# Patient Record
Sex: FEMALE
[De-identification: names, ages, dates, MRNs, and addresses within clinical notes are randomized per-mention and may not be internally consistent; named-entity substitution may affect disease eponyms.]

---

## 2022-08-08 ENCOUNTER — HOSPITAL ENCOUNTER (INPATIENT)
Dept: HOSPITAL 5 - ED | Age: 62
LOS: 11 days | Discharge: NURSING, REHAB OR LONG-TERM CARE | DRG: 177 | End: 2022-08-19
Attending: STUDENT IN AN ORGANIZED HEALTH CARE EDUCATION/TRAINING PROGRAM | Admitting: HOSPITALIST
Payer: MEDICARE

## 2022-08-08 DIAGNOSIS — N18.6: ICD-10-CM

## 2022-08-08 DIAGNOSIS — D53.9: ICD-10-CM

## 2022-08-08 DIAGNOSIS — I13.2: ICD-10-CM

## 2022-08-08 DIAGNOSIS — J12.82: ICD-10-CM

## 2022-08-08 DIAGNOSIS — N25.81: ICD-10-CM

## 2022-08-08 DIAGNOSIS — Z99.2: ICD-10-CM

## 2022-08-08 DIAGNOSIS — I50.9: ICD-10-CM

## 2022-08-08 DIAGNOSIS — U07.1: Primary | ICD-10-CM

## 2022-08-08 DIAGNOSIS — Z88.8: ICD-10-CM

## 2022-08-08 DIAGNOSIS — J96.01: ICD-10-CM

## 2022-08-08 DIAGNOSIS — E44.1: ICD-10-CM

## 2022-08-08 DIAGNOSIS — Z88.2: ICD-10-CM

## 2022-08-08 DIAGNOSIS — E03.9: ICD-10-CM

## 2022-08-08 DIAGNOSIS — Z82.49: ICD-10-CM

## 2022-08-08 LAB
ALBUMIN SERPL-MCNC: 2.8 G/DL (ref 3.9–5)
ALT SERPL-CCNC: 14 UNITS/L (ref 7–56)
ANISOCYTOSIS BLD QL SMEAR: (no result)
APTT BLD: 33.8 SEC. (ref 24.2–36.6)
BAND NEUTROPHILS # (MANUAL): 1.2 K/MM3
BUN SERPL-MCNC: 39 MG/DL (ref 7–17)
BUN/CREAT SERPL: 11 %
CALCIUM SERPL-MCNC: 8.2 MG/DL (ref 8.4–10.2)
HCT VFR BLD CALC: 30.4 % (ref 30.3–42.9)
HDLC SERPL-MCNC: 64 MG/DL (ref 40–59)
HEMOLYSIS INDEX: 50
HGB BLD-MCNC: 9.6 GM/DL (ref 10.1–14.3)
INR PPP: 0.91 (ref 0.87–1.13)
MCHC RBC AUTO-ENTMCNC: 31 % (ref 30–34)
MCV RBC AUTO: 99 FL (ref 79–97)
MYELOCYTES # (MANUAL): 0 K/MM3
PLATELET # BLD: 187 K/MM3 (ref 140–440)
PROMYELOCYTES # (MANUAL): 0 K/MM3
RBC # BLD AUTO: 3.08 M/MM3 (ref 3.65–5.03)
TOTAL CELLS COUNTED BLD: 100

## 2022-08-08 PROCEDURE — 87641 MR-STAPH DNA AMP PROBE: CPT

## 2022-08-08 PROCEDURE — 85730 THROMBOPLASTIN TIME PARTIAL: CPT

## 2022-08-08 PROCEDURE — 94760 N-INVAS EAR/PLS OXIMETRY 1: CPT

## 2022-08-08 PROCEDURE — 84484 ASSAY OF TROPONIN QUANT: CPT

## 2022-08-08 PROCEDURE — C8929 TTE W OR WO FOL WCON,DOPPLER: HCPCS

## 2022-08-08 PROCEDURE — U0003 INFECTIOUS AGENT DETECTION BY NUCLEIC ACID (DNA OR RNA); SEVERE ACUTE RESPIRATORY SYNDROME CORONAVIRUS 2 (SARS-COV-2) (CORONAVIRUS DISEASE [COVID-19]), AMPLIFIED PROBE TECHNIQUE, MAKING USE OF HIGH THROUGHPUT TECHNOLOGIES AS DESCRIBED BY CMS-2020-01-R: HCPCS

## 2022-08-08 PROCEDURE — 93306 TTE W/DOPPLER COMPLETE: CPT

## 2022-08-08 PROCEDURE — 87040 BLOOD CULTURE FOR BACTERIA: CPT

## 2022-08-08 PROCEDURE — 5A0945A ASSISTANCE WITH RESPIRATORY VENTILATION, 24-96 CONSECUTIVE HOURS, HIGH NASAL FLOW/VELOCITY: ICD-10-PCS | Performed by: HOSPITALIST

## 2022-08-08 PROCEDURE — 94640 AIRWAY INHALATION TREATMENT: CPT

## 2022-08-08 PROCEDURE — 82728 ASSAY OF FERRITIN: CPT

## 2022-08-08 PROCEDURE — 93005 ELECTROCARDIOGRAM TRACING: CPT

## 2022-08-08 PROCEDURE — 36415 COLL VENOUS BLD VENIPUNCTURE: CPT

## 2022-08-08 PROCEDURE — 83615 LACTATE (LD) (LDH) ENZYME: CPT

## 2022-08-08 PROCEDURE — 83880 ASSAY OF NATRIURETIC PEPTIDE: CPT

## 2022-08-08 PROCEDURE — 85025 COMPLETE CBC W/AUTO DIFF WBC: CPT

## 2022-08-08 PROCEDURE — 85379 FIBRIN DEGRADATION QUANT: CPT

## 2022-08-08 PROCEDURE — 86140 C-REACTIVE PROTEIN: CPT

## 2022-08-08 PROCEDURE — 80048 BASIC METABOLIC PNL TOTAL CA: CPT

## 2022-08-08 PROCEDURE — 94644 CONT INHLJ TX 1ST HOUR: CPT

## 2022-08-08 PROCEDURE — 80074 ACUTE HEPATITIS PANEL: CPT

## 2022-08-08 PROCEDURE — 85007 BL SMEAR W/DIFF WBC COUNT: CPT

## 2022-08-08 PROCEDURE — 80053 COMPREHEN METABOLIC PANEL: CPT

## 2022-08-08 PROCEDURE — 71045 X-RAY EXAM CHEST 1 VIEW: CPT

## 2022-08-08 PROCEDURE — 80061 LIPID PANEL: CPT

## 2022-08-08 PROCEDURE — 93970 EXTREMITY STUDY: CPT

## 2022-08-08 PROCEDURE — 82962 GLUCOSE BLOOD TEST: CPT

## 2022-08-08 PROCEDURE — 85610 PROTHROMBIN TIME: CPT

## 2022-08-08 NOTE — HISTORY AND PHYSICAL REPORT
History of Present Illness


Date of examination: 08/08/22


Date of admission: 


08/08/22 22:37





Chief complaint: 





Dyspnea, respiratory distress


History of present illness: 





 61-year-old female with a history of end-stage renal disease (dialysis 

dependent) who lives in a nursing home for rehabilitation for the last 2 weeks 

was brought to the emergency department complaining of shortness of breath which

started approximately 2 days ago.  Patient reports a "wet" cough, that she still

is unable to expectorate.  Patient denies chest pain, fever, chills.  Patient 

denies worsening of lower extremity edema or lower extremity pain.  Patient 

reports that exertion and movement makes the shortness of breath worse, and the 

oxygen that she was put on prior to coming to the emergency department has 

helped with her shortness of breath.  Patient was placed on a nonrebreather at 

the nursing home, as she was hypoxic at the nursing home .  Patient is not home 

O2 dependent.  Patient has not missed any dialysis, and her last one was on 

Saturday.  Patient was tested for and confirmed positive for COVID-19, today.





In the emergency room patient is found to have BUN of 39, creatinine 3.7, 

troponin 0.075 and proBNP 39213, chest x-ray shows bibasilar opacities, likely 

mild atelectasis.'s were going to admit the patient we will put the patient on 

neb treatment steroid antibiotic consult infectious disease and nephrology





Past History


Past Medical History: ESRD, hypertension, hypothyroidism, renal failure


Past Surgical History: No surgical history


Social history: no significant social history


Family history: hypertension





Medications and Allergies


                                    Allergies











Allergy/AdvReac Type Severity Reaction Status Date / Time


 


NSAIDS (Non-Steroidal Allergy  Rash Verified 08/08/22 13:30





Anti-Inflamma     


 


Sulfa (Sulfonamide Allergy  Rash Verified 08/08/22 13:30





Antibiotics)     











                                Home Medications











 Medication  Instructions  Recorded  Confirmed  Last Taken  Type


 


Ascorbic Acid [Vitamin C] 500 mg PO DAILY 08/08/22 08/08/22 Unknown History


 


Ferrous Sulfate [Ferrous Sulfate 324 mg PO DAILY 08/08/22 08/08/22 Unknown 

History





324 MG]     


 


Levothyroxine Sodium 200 mcg PO DAILY 08/08/22 08/08/22 Unknown History





[Levothyroxine]     


 


Venlafaxine [Effexor 37.5mg tab] 37.5 mg PO QDAY 08/08/22 08/08/22 Unknown 

History


 


carvediloL [Coreg] 12.5 mg PO BID 08/08/22 08/08/22 Unknown History


 


sevelamer HCL [Sevelamer HCl] 800 mg PO DAILY 08/08/22 08/08/22 Unknown History











Active Meds: 


Active Medications





Acetaminophen (Acetaminophen 325 Mg Tab)  650 mg PO Q4H PRN


   PRN Reason: Pain MILD(1-3)/Fever >100.5/HA


Morphine Sulfate (Morphine 2 Mg/1 Ml Inj)  2 mg IV Q4H PRN


   PRN Reason: Pain, Moderate (4-6)


Ondansetron HCl (Ondansetron 4 Mg/2 Ml Inj)  4 mg IV Q8H PRN


   PRN Reason: Nausea And Vomiting


Sodium Chloride (Sodium Chloride 0.9% 10 Ml Flush Syringe)  10 ml IV BID BERHANE


Sodium Chloride (Sodium Chloride 0.9% 10 Ml Flush Syringe)  10 ml IV PRN PRN


   PRN Reason: LINE FLUSH











Review of Systems


All systems: negative


Constitutional: fatigue, malaise, lethargy


Cardiovascular: edema, shortness of breath, dyspnea on exertion


Respiratory: shortness of breath, dyspnea on exertion





Exam





- Constitutional


Vitals: 


                                        











Temp Pulse Resp BP Pulse Ox


 


 99 F   93 H  20   158/85   88 


 


 08/08/22 13:22  08/08/22 21:21  08/08/22 21:21  08/08/22 21:21  08/08/22 21:21











General appearance: Present: no acute distress, well-nourished





- EENT


Eyes: Present: PERRL


ENT: hearing intact, clear oral mucosa





- Neck


Neck: Present: supple, normal ROM





- Respiratory


Respiratory effort: normal


Respiratory: bilateral: diminished





- Cardiovascular


Heart Sounds: Present: S1 & S2.  Absent: rub, click





- Extremities


Extremities: pulses symmetrical, No edema


Peripheral Pulses: within normal limits





- Abdominal


General gastrointestinal: Present: soft, non-tender, non-distended, normal bowel

sounds


Female genitourinary: Present: normal





- Integumentary


Integumentary: Present: clear, warm, dry





- Musculoskeletal


Musculoskeletal: gait normal, strength equal bilaterally





- Psychiatric


Psychiatric: appropriate mood/affect, intact judgment & insight





- Neurologic


Neurologic: CNII-XII intact, moves all extremities





HEART Score





- HEART Score


Troponin: 


                                        











Troponin T  0.075 ng/mL (0.00-0.029)  H  08/08/22  14:49    














Results





- Labs


CBC & Chem 7: 


                                 08/08/22 14:49





                                 08/08/22 14:49


Labs: 


                             Laboratory Last Values











WBC  8.4 K/mm3 (4.5-11.0)   08/08/22  14:49    


 


RBC  3.08 M/mm3 (3.65-5.03)  L  08/08/22  14:49    


 


Hgb  9.6 gm/dl (10.1-14.3)  L  08/08/22  14:49    


 


Hct  30.4 % (30.3-42.9)   08/08/22  14:49    


 


MCV  99 fl (79-97)  H  08/08/22  14:49    


 


MCH  31 pg (28-32)   08/08/22  14:49    


 


MCHC  31 % (30-34)   08/08/22  14:49    


 


RDW  17.7 % (13.2-15.2)  H  08/08/22  14:49    


 


Plt Count  187 K/mm3 (140-440)   08/08/22  14:49    


 


Baso % (Auto)  Np   08/08/22  14:49    


 


Add Manual Diff  Complete   08/08/22  14:49    


 


Total Counted  100   08/08/22  14:49    


 


Seg Neuts % (Manual)  78.0 % (40.0-70.0)  H  08/08/22  14:49    


 


Band Neutrophils %  14.0 %  08/08/22  14:49    


 


Lymphocytes % (Manual)  3.0 % (13.4-35.0)  L  08/08/22  14:49    


 


Reactive Lymphs % (Man)  0 %  08/08/22  14:49    


 


Monocytes % (Manual)  5.0 % (0.0-7.3)   08/08/22  14:49    


 


Eosinophils % (Manual)  0 % (0.0-4.3)   08/08/22  14:49    


 


Basophils % (Manual)  0 % (0.0-1.8)   08/08/22  14:49    


 


Metamyelocytes %  0 %  08/08/22  14:49    


 


Myelocytes %  0 %  08/08/22  14:49    


 


Promyelocytes %  0 %  08/08/22  14:49    


 


Blast Cells %  0 %  08/08/22  14:49    


 


Nucleated RBC %  Not Reportable   08/08/22  14:49    


 


Seg Neutrophils # Man  6.6 K/mm3 (1.8-7.7)   08/08/22  14:49    


 


Band Neutrophils #  1.2 K/mm3  08/08/22  14:49    


 


Lymphocytes # (Manual)  0.3 K/mm3 (1.2-5.4)  L  08/08/22  14:49    


 


Abs React Lymphs (Man)  0.0 K/mm3  08/08/22  14:49    


 


Monocytes # (Manual)  0.4 K/mm3 (0.0-0.8)   08/08/22  14:49    


 


Eosinophils # (Manual)  0.0 K/mm3 (0.0-0.4)   08/08/22  14:49    


 


Basophils # (Manual)  0.0 K/mm3 (0.0-0.1)   08/08/22  14:49    


 


Metamyelocytes #  0.0 K/mm3  08/08/22  14:49    


 


Myelocytes #  0.0 K/mm3  08/08/22  14:49    


 


Promyelocytes #  0.0 K/mm3  08/08/22  14:49    


 


Blast Cells #  0.0 K/mm3  08/08/22  14:49    


 


WBC Morphology  Not Reportable   08/08/22  14:49    


 


Hypersegmented Neuts  Not Reportable   08/08/22  14:49    


 


Hyposegmented Neuts  Not Reportable   08/08/22  14:49    


 


Hypogranular Neuts  Not Reportable   08/08/22  14:49    


 


Smudge Cells  Not Reportable   08/08/22  14:49    


 


Toxic Granulation  Not Reportable   08/08/22  14:49    


 


Toxic Vacuolation  Not Reportable   08/08/22  14:49    


 


Dohle Bodies  Not Reportable   08/08/22  14:49    


 


Pelger-Huet Anomaly  Not Reportable   08/08/22  14:49    


 


Komal Rods  Not Reportable   08/08/22  14:49    


 


Platelet Estimate  Consistent w auto   08/08/22  14:49    


 


Clumped Platelets  Not Reportable   08/08/22  14:49    


 


Plt Clumps, EDTA  Not Reportable   08/08/22  14:49    


 


Large Platelets  Not Reportable   08/08/22  14:49    


 


Giant Platelets  Not Reportable   08/08/22  14:49    


 


Platelet Satelliting  Not Reportable   08/08/22  14:49    


 


Plt Morphology Comment  Not Reportable   08/08/22  14:49    


 


RBC Morphology  Not Reportable   08/08/22  14:49    


 


Dimorphic RBCs  Not Reportable   08/08/22  14:49    


 


Polychromasia  Not Reportable   08/08/22  14:49    


 


Hypochromasia  Not Reportable   08/08/22  14:49    


 


Poikilocytosis  Not Reportable   08/08/22  14:49    


 


Anisocytosis  1+   08/08/22  14:49    


 


Microcytosis  Not Reportable   08/08/22  14:49    


 


Macrocytosis  Not Reportable   08/08/22  14:49    


 


Spherocytes  Not Reportable   08/08/22  14:49    


 


Pappenheimer Bodies  Not Reportable   08/08/22  14:49    


 


Sickle Cells  Not Reportable   08/08/22  14:49    


 


Target Cells  Not Reportable   08/08/22  14:49    


 


Tear Drop Cells  Not Reportable   08/08/22  14:49    


 


Ovalocytes  Not Reportable   08/08/22  14:49    


 


Helmet Cells  Not Reportable   08/08/22  14:49    


 


Price-Bonfield Bodies  Not Reportable   08/08/22  14:49    


 


Cabot Rings  Not Reportable   08/08/22  14:49    


 


Callao Cells  Not Reportable   08/08/22  14:49    


 


Bite Cells  Not Reportable   08/08/22  14:49    


 


Crenated Cell  Not Reportable   08/08/22  14:49    


 


Elliptocytes  Not Reportable   08/08/22  14:49    


 


Acanthocytes (Spur)  Not Reportable   08/08/22  14:49    


 


Rouleaux  Not Reportable   08/08/22  14:49    


 


Hemoglobin C Crystals  Not Reportable   08/08/22  14:49    


 


Schistocytes  Not Reportable   08/08/22  14:49    


 


Malaria parasites  Not Reportable   08/08/22  14:49    


 


Sarabjit Bodies  Not Reportable   08/08/22  14:49    


 


Hem Pathologist Commnt  No   08/08/22  14:49    


 


PT  13.5 Sec. (12.2-14.9)   08/08/22  14:49    


 


INR  0.91  (0.87-1.13)   08/08/22  14:49    


 


APTT  33.8 Sec. (24.2-36.6)   08/08/22  14:49    


 


Sodium  133 mmol/L (137-145)  L  08/08/22  14:49    


 


Potassium  4.6 mmol/L (3.6-5.0)   08/08/22  14:49    


 


Chloride  98.7 mmol/L ()   08/08/22  14:49    


 


Carbon Dioxide  21 mmol/L (22-30)  L  08/08/22  14:49    


 


Anion Gap  18 mmol/L  08/08/22  14:49    


 


BUN  39 mg/dL (7-17)  H  08/08/22  14:49    


 


Creatinine  3.7 mg/dL (0.6-1.2)  H  08/08/22  14:49    


 


Estimated GFR  13 ml/min  08/08/22  14:49    


 


BUN/Creatinine Ratio  11 %  08/08/22  14:49    


 


Glucose  83 mg/dL ()   08/08/22  14:49    


 


Calcium  8.2 mg/dL (8.4-10.2)  L  08/08/22  14:49    


 


Total Bilirubin  0.20 mg/dL (0.1-1.2)   08/08/22  14:49    


 


AST  26 units/L (5-40)   08/08/22  14:49    


 


ALT  14 units/L (7-56)   08/08/22  14:49    


 


Alkaline Phosphatase  70 units/L ()   08/08/22  14:49    


 


Troponin T  0.075 ng/mL (0.00-0.029)  H  08/08/22  14:49    


 


NT-Pro-B Natriuret Pep  74984 pg/mL (0-900)  H  08/08/22  14:49    


 


Total Protein  5.6 g/dL (6.3-8.2)  L  08/08/22  14:49    


 


Albumin  2.8 g/dL (3.9-5)  L  08/08/22  14:49    


 


Albumin/Globulin Ratio  1.0 %  08/08/22  14:49    


 


Triglycerides  128 mg/dL (2-149)   08/08/22  14:49    


 


Cholesterol  154 mg/dL ()   08/08/22  14:49    


 


LDL Cholesterol Direct  63 mg/dL ()   08/08/22  14:49    


 


HDL Cholesterol  64 mg/dL (40-59)  H  08/08/22  14:49    


 


Cholesterol/HDL Ratio  2.40 %  08/08/22  14:49    











Microbiology: 


Microbiology





08/08/22 16:49   Peripheral/Venous   Blood Culture - Preliminary


                            Culture in Progress


08/08/22 16:49   Peripheral/Venous   Blood Culture - Preliminary


                            Culture in Progress











- Imaging and Cardiology


Chest x-ray: report reviewed





Assessment and Plan


VTE prophylaxis?: Chemical


Plan of care discussed with patient/family: Yes





- Patient Problems


(1) COVID-19


Current Visit: Yes   Status: Acute   


Plan to address problem: 


Admit the patient to the medical telemetry.  Patient is on high flow nasal 

cannula.  DuoNeb nebulizer every 4 hours.  Albuterol via nebulizer every 4 hours

as needed.  Decadron 10 mg IV daily.  Zithromax to 50 mg p.o. daily.  We do the 

blood culture and sputum culture.  We will consult infectious disease for 

evaluation








(2) End-stage renal disease on hemodialysis


Current Visit: Yes   Status: Acute   


Plan to address problem: 


Will consult nephrology for hemodialysis in the morning.  Continue home me

dication.  Recheck BMP in the morning








(3) Hypothyroidism


Current Visit: Yes   Status: Acute   


Plan to address problem: 


Is stable.  We will continue the home medication








(4) CHF (congestive heart failure)


Current Visit: Yes   Status: Acute   


Qualifiers: 


   Heart failure type: unspecified   Heart failure chronicity: unspecified   

Qualified Code(s): I50.9 - Heart failure, unspecified   


Plan to address problem: 


Fluid restriction.  Maintain input output.  Daily weight.  Lasix 40 mg IV x1 

dose.  Echocardiogram.  Will consult nephrology for hemodialysis








(5) Hypoxia


Current Visit: Yes   Status: Acute   


Plan to address problem: 


Patient is on high flow nasal cannula.  DuoNeb nebulizer every 4 hours.  

Albuterol via nebulizer every 4 hours as needed.  








(6) Shortness of breath


Current Visit: Yes   Status: Acute   


Plan to address problem: 


Patient is on high flow nasal cannula.  DuoNeb nebulizer every 4 hours.  

Albuterol via nebulizer every 4 hours as needed.  Decadron 10 mg IV daily.  

Zithromax to 50 mg p.o. daily.  We do the blood culture and sputum culture.  We 

will consult infectious disease for evaluation








(7) DVT prophylaxis


Current Visit: Yes   Status: Acute   


Plan to address problem: 


Heparin 5000 units subcu every 8 hours for DVT prophylaxis.  Pepcid 20 mg p.o. 

twice daily for GI prophylaxis.  Patient is a full code

## 2022-08-08 NOTE — XRAY REPORT
CHEST 1 VIEW 8/8/2022 1:36 PM



INDICATION / CLINICAL INFORMATION: Dyspnea.



COMPARISON: None available.



FINDINGS:



SUPPORT DEVICES: Right IJ CVL tip projects over the superior cavoatrial junction.



HEART / MEDIASTINUM: Mild cardiomegaly. 



LUNGS / PLEURA: Bibasilar opacities, likely mild atelectasis. No pneumothorax. 



ADDITIONAL FINDINGS: No significant additional findings.



Signer Name: Alexander Chavez MD 

Signed: 8/8/2022 2:39 PM

Workstation Name: [employer]

## 2022-08-08 NOTE — EMERGENCY DEPARTMENT REPORT
<AMITA WEBER - Last Filed: 08/08/22 16:04>





ED Shortness of Breath HPI





- General


Chief Complaint: Dyspnea/Respdistress


Stated Complaint: ENA


Time Seen by Provider: 08/08/22 13:25


Source: patient, EMS


Mode of arrival: Stretcher


Limitations: No Limitations





- History of Present Illness


Initial Comments: 





This is a 61-year-old female with a history of end-stage renal disease (dialysis

dependent) who lives in a nursing home for rehabilitation for the last 2 weeks. 

Patient presents to the emergency department complaining of shortness of breath 

which started approximately 2 days ago.  Patient reports a "wet" cough, that she

still is unable to expectorate.  Patient denies chest pain, fever, chills.  

Patient denies worsening of lower extremity edema or lower extremity pain.  

Patient reports that exertion and movement makes the shortness of breath worse, 

and the oxygen that she was put on prior to coming to the emergency department 

has helped with her shortness of breath.  Patient was placed on a nonrebreather 

at the nursing home, as she was hypoxic at the nursing home and remained hypoxic

abnormality.  Patient is not home O2 dependent.  Patient has not missed any 

dialysis, and her last one was on Saturday.  Patient was tested for and 

confirmed positive for COVID-19, today.





- Related Data


                                    Allergies











Allergy/AdvReac Type Severity Reaction Status Date / Time


 


NSAIDS (Non-Steroidal Allergy  Rash Verified 08/08/22 13:30





Anti-Inflamma     


 


Sulfa (Sulfonamide Allergy  Rash Verified 08/08/22 13:30





Antibiotics)     














ED Review of Systems


Comment: All other systems reviewed and negative


Constitutional: denies: chills, fever, weakness


Eyes: denies: eye pain, eye discharge, vision change


ENT: denies: ear pain, throat pain


Respiratory: cough, shortness of breath, SOB with exertion, SOB at rest, 

wheezing


Cardiovascular: edema.  denies: chest pain, palpitations


Endocrine: no symptoms reported


Gastrointestinal: denies: abdominal pain, nausea, diarrhea


Genitourinary: denies: urgency, dysuria, discharge


Musculoskeletal: denies: back pain, joint swelling, arthralgia


Skin: denies: rash, lesions


Neurological: denies: headache, weakness, paresthesias


Psychiatric: denies: anxiety, depression


Hematological/Lymphatic: denies: easy bleeding, easy bruising





ED Past Medical Hx





- Past Medical History


Previous Medical History?: Yes


Hx Hypertension: Yes


Hx Congestive Heart Failure: No


Hx Renal Disease: Yes


Additional medical history: ESRD, Hypothyroidism





- Social History


Other Social History: 





Has lived in a nursing home for the last 2 weeks





ED Physical Exam





- General


Limitations: No Limitations, Physical Limitation


General appearance: alert, in no apparent distress, obese





- Head


Head exam: Present: atraumatic, normocephalic, normal inspection





- Eye


Eye exam: Present: normal appearance, PERRL, EOMI





- ENT


ENT exam: Present: mucous membranes moist





- Neck


Neck exam: Present: normal inspection, full ROM





- Respiratory


Respiratory exam: Present: normal lung sounds bilaterally, wheezes, rales, 

rhonchi, prolonged expiratory.  Absent: respiratory distress





- Cardiovascular


Cardiovascular Exam: Present: regular rate, normal rhythm, tachycardia.  Absent:

systolic murmur, diastolic murmur, rubs, gallop





- GI/Abdominal


GI/Abdominal exam: Present: soft, normal bowel sounds





- Extremities Exam


Extremities exam: Present: normal inspection, pedal edema (2+ pitting edema 

bilaterally; patient reports that this is chronic).  Absent: calf tenderness





- Back Exam


Back exam: Present: normal inspection





- Neurological Exam


Neurological exam: Present: alert, oriented X3, CN II-XII intact.  Absent: 

normal gait





- Psychiatric


Psychiatric exam: Present: normal affect, normal mood, flat affect.  Absent: 

depressed, suicidal ideation





- Skin


Skin exam: Present: warm, dry, intact, normal color.  Absent: rash





ED Medical Decision Making





- EKG Data


EKG shows normal: sinus rhythm


Rate: normal





- EKG Data


When compared to previous EKG there are: previous EKG unavailable


Interpretation: nonspecific ST-T wave dona, other (PACs, Q waves in 3 and aVF 

concerning for remote inferior infarct)





- Differential Diagnosis


Viral pneumonia, bacterial pneumonia, fluid overload, COPD,ACS





ED Disposition


Clinical Impression: 


 Shortness of breath, COVID-19, Hypoxia





CHF (congestive heart failure)


Qualifiers:


 Heart failure type: unspecified Heart failure chronicity: unspecified Qualified

Code(s): I50.9 - Heart failure, unspecified





Disposition: 09 ADMITTED AS INPATIENT


Does the pt Need Aspirin: No


Condition: Serious


Referrals: 


GENEVA BLAS MD [Primary Care Provider] - 3-5 Days





<RODOLFO MONTES - Last Filed: 08/08/22 22:37>





ED Review of Systems


ROS: 


Stated complaint: ENA


Other details as noted in HPI








ED Course


                                   Vital Signs











  08/08/22 08/08/22 08/08/22





  13:22 13:33 13:45


 


Temperature 99 F  


 


Pulse Rate 92 H 97 H 95 H


 


Pulse Rate [   





Anterior   





Bilateral   





Throughout]   


 


Respiratory 18 24 22





Rate   


 


Respiratory   





Rate [Anterior   





Bilateral   





Throughout]   


 


Blood Pressure   140/74


 


Blood Pressure 130/73  





[Left]   


 


O2 Sat by Pulse 94 97 86





Oximetry   














  08/08/22 08/08/22 08/08/22





  13:50 13:56 14:01


 


Temperature   


 


Pulse Rate  100 H 95 H


 


Pulse Rate [   





Anterior   





Bilateral   





Throughout]   


 


Respiratory  26 H 31 H





Rate   


 


Respiratory   





Rate [Anterior   





Bilateral   





Throughout]   


 


Blood Pressure   140/74


 


Blood Pressure   





[Left]   


 


O2 Sat by Pulse 92 98 93





Oximetry   














  08/08/22 08/08/22 08/08/22





  14:12 14:15 14:31


 


Temperature   


 


Pulse Rate  96 H 93 H


 


Pulse Rate [ 97 H  





Anterior   





Bilateral   





Throughout]   


 


Respiratory  25 H 15





Rate   


 


Respiratory 20  





Rate [Anterior   





Bilateral   





Throughout]   


 


Blood Pressure  135/75 135/75


 


Blood Pressure   





[Left]   


 


O2 Sat by Pulse  94 87





Oximetry   














  08/08/22 08/08/22 08/08/22





  14:45 15:01 15:15


 


Temperature   


 


Pulse Rate 90 92 H 86


 


Pulse Rate [   





Anterior   





Bilateral   





Throughout]   


 


Respiratory 24 15 21





Rate   


 


Respiratory   





Rate [Anterior   





Bilateral   





Throughout]   


 


Blood Pressure 127/68 135/75 127/74


 


Blood Pressure   





[Left]   


 


O2 Sat by Pulse 92 93 





Oximetry   














  08/08/22 08/08/22 08/08/22





  15:31 15:45 16:01


 


Temperature   


 


Pulse Rate 90 90 89


 


Pulse Rate [   





Anterior   





Bilateral   





Throughout]   


 


Respiratory 19 27 H 24





Rate   


 


Respiratory   





Rate [Anterior   





Bilateral   





Throughout]   


 


Blood Pressure 127/74 137/74 137/74


 


Blood Pressure   





[Left]   


 


O2 Sat by Pulse   





Oximetry   














  08/08/22 08/08/22 08/08/22





  16:15 16:31 16:45


 


Temperature   


 


Pulse Rate 87 88 89


 


Pulse Rate [   





Anterior   





Bilateral   





Throughout]   


 


Respiratory 21 15 22





Rate   


 


Respiratory   





Rate [Anterior   





Bilateral   





Throughout]   


 


Blood Pressure 137/71 137/71 145/70


 


Blood Pressure   





[Left]   


 


O2 Sat by Pulse   





Oximetry   














  08/08/22 08/08/22 08/08/22





  17:01 17:15 17:31


 


Temperature   


 


Pulse Rate 89 91 H 87


 


Pulse Rate [   





Anterior   





Bilateral   





Throughout]   


 


Respiratory 24 12 11 L





Rate   


 


Respiratory   





Rate [Anterior   





Bilateral   





Throughout]   


 


Blood Pressure 145/70 144/78 144/78


 


Blood Pressure   





[Left]   


 


O2 Sat by Pulse   





Oximetry   














  08/08/22 08/08/22 08/08/22





  17:45 19:37 21:21


 


Temperature   


 


Pulse Rate 84 93 H 93 H


 


Pulse Rate [   





Anterior   





Bilateral   





Throughout]   


 


Respiratory 23 20 20





Rate   


 


Respiratory   





Rate [Anterior   





Bilateral   





Throughout]   


 


Blood Pressure 144/76  


 


Blood Pressure  124/70 158/85





[Left]   


 


O2 Sat by Pulse  91 88





Oximetry   














- Reevaluation(s)


Reevaluation #1: 





08/08/22 20:30


Pt signed to me at shift change at 15:00 PM -with dyspnea -lab reviewed and 

noted with elevated BNP at 13, 672 with CXR with vascular congestion with 

cardiomegaly --consisted with heart failure-- also noted with acute renal 

failure with BUN/Cr 39/3.7 and could also be as a result of dehydration-- Pt is 

also diagnosed with Covid 19 today. 





Given 40 mg IV lasix for the congestion and will consider admission to the 

ospitalist considering this patient age and clinical picture. 


08/08/22 22:33


Dr Arauz consulted who accept pt for further evaluation and treatment 





- Consultations


Consultation #1: 





08/08/22 22:34


Dr Arauz 





ED Medical Decision Making





- Lab Data


Result diagrams: 


                                 08/08/22 14:49





                                 08/08/22 14:49


Critical Care Time: Yes (60)


Critical care time in (mins) excluding proc time.: 60


Critical care attestation.: 


If time is entered above; I have spent that time in minutes in the direct care 

of this critically ill patient, excluding procedure time.


 Pt present with SOB and noted to have Covid 19 and acute CHF and due to high 

probability of clinically significant, life threatening deterioration, this 

patient required my highest level of preparedness to intervene emergently and I 

personally spent this critical care time directly and personally managing this 

patient.  This critical care time included obtaining a history; examining this 

patient; pulse oximetry ; ordering and review of studies ; arranging urgent 

treatment with development of a management plan ; evaluation of patient's 

response to treatment ; frequent reassessment ; and, discussion with other 

providers.  This critical care time was performed to assess and manage the high 

probability of imminent, life-threatening deterioration that could result in m

ultiple organ damage if not done in a timely fashion.





ED Disposition


Is pt being admited?: No


Time of Disposition: 22:37

## 2022-08-09 LAB
BUN SERPL-MCNC: 48 MG/DL (ref 7–17)
BUN/CREAT SERPL: 12 %
CALCIUM SERPL-MCNC: 8.7 MG/DL (ref 8.4–10.2)
CRP SERPL-MCNC: 10.5 MG/DL (ref 0–1.3)
HEMOLYSIS INDEX: 0

## 2022-08-09 PROCEDURE — 5A1D70Z PERFORMANCE OF URINARY FILTRATION, INTERMITTENT, LESS THAN 6 HOURS PER DAY: ICD-10-PCS | Performed by: INTERNAL MEDICINE

## 2022-08-09 RX ADMIN — IPRATROPIUM BROMIDE AND ALBUTEROL SULFATE SCH AMPUL: .5; 3 SOLUTION RESPIRATORY (INHALATION) at 14:41

## 2022-08-09 RX ADMIN — FUROSEMIDE SCH MG: 10 INJECTION, SOLUTION INTRAVENOUS at 18:16

## 2022-08-09 RX ADMIN — ACETAMINOPHEN PRN MG: 325 TABLET ORAL at 23:29

## 2022-08-09 RX ADMIN — Medication SCH ML: at 23:28

## 2022-08-09 RX ADMIN — HEPARIN SODIUM SCH UNIT: 5000 INJECTION, SOLUTION INTRAVENOUS; SUBCUTANEOUS at 05:48

## 2022-08-09 RX ADMIN — SEVELAMER CARBONATE SCH MG: 800 TABLET, FILM COATED ORAL at 09:34

## 2022-08-09 RX ADMIN — IPRATROPIUM BROMIDE AND ALBUTEROL SULFATE SCH: .5; 3 SOLUTION RESPIRATORY (INHALATION) at 08:55

## 2022-08-09 RX ADMIN — SEVELAMER CARBONATE SCH MG: 800 TABLET, FILM COATED ORAL at 18:17

## 2022-08-09 RX ADMIN — IPRATROPIUM BROMIDE AND ALBUTEROL SULFATE SCH AMPUL: .5; 3 SOLUTION RESPIRATORY (INHALATION) at 02:36

## 2022-08-09 RX ADMIN — FERROUS SULFATE TAB 325 MG (65 MG ELEMENTAL FE) SCH MG: 325 (65 FE) TAB at 09:34

## 2022-08-09 RX ADMIN — Medication SCH ML: at 09:44

## 2022-08-09 RX ADMIN — HEPARIN SODIUM SCH UNIT: 5000 INJECTION, SOLUTION INTRAVENOUS; SUBCUTANEOUS at 23:28

## 2022-08-09 RX ADMIN — FUROSEMIDE SCH MG: 10 INJECTION, SOLUTION INTRAVENOUS at 09:43

## 2022-08-09 RX ADMIN — FAMOTIDINE SCH MG: 20 TABLET ORAL at 09:34

## 2022-08-09 RX ADMIN — LEVOTHYROXINE SODIUM SCH MCG: 0.1 TABLET ORAL at 05:48

## 2022-08-09 RX ADMIN — GUAIFENESIN PRN MG: 100 SOLUTION ORAL at 23:30

## 2022-08-09 RX ADMIN — OXYCODONE HYDROCHLORIDE AND ACETAMINOPHEN SCH MG: 500 TABLET ORAL at 09:34

## 2022-08-09 RX ADMIN — SEVELAMER CARBONATE SCH MG: 800 TABLET, FILM COATED ORAL at 15:00

## 2022-08-09 RX ADMIN — IPRATROPIUM BROMIDE AND ALBUTEROL SULFATE SCH AMPUL: .5; 3 SOLUTION RESPIRATORY (INHALATION) at 20:19

## 2022-08-09 RX ADMIN — AZITHROMYCIN SCH MG: 250 TABLET, FILM COATED ORAL at 09:34

## 2022-08-09 RX ADMIN — HEPARIN SODIUM SCH UNIT: 5000 INJECTION, SOLUTION INTRAVENOUS; SUBCUTANEOUS at 15:00

## 2022-08-09 NOTE — CONSULTATION
History of Present Illness





- Reason for Consult


Consult date: 08/09/22


COVID


Requesting physician: CRISTAL JERONIMO





- History of Present Illness





The patient is a 61-year-old female with ESRD on HD, hypertension, 

hypothyroidism, nursing home resident was brought into the emergency room due to

shortness of breath.  She tested positive for COVID-19 at the nursing home, 

developed worsening shortness of breath, noted to be hypoxic, hence was 

transferred here and hospitalized.  Upon evaluation here, afebrile, initially 

was on Venturi mask, now on high flow nasal cannula.  ID was consulted for 

additional management.





Labs showed normal WBC, normal platelet, creatinine 3.7, proBNP elevated 13,672,

troponin 0.07.  Chest x-ray showed bibasilar opacities, PermCath in place.





Review of Systems: 


General: no fevers,chills or rigors


HEENT: no new visual disturbance


Respiratory: Shortness of breath better with oxygen


Cardiovascular: No chest pain, syncope


Gastrointestinal: No nausea, vomiting or diarrhea


Genitourinary: No dysuria or hematuria


Musculoskeletal: No new or worsening neck pain or back pain 


Neurologic: No headaches, seizures


Hematologic: No easy bruising or bleeding


Endocrine: No night sweats or acute weight loss


Skin: negative for rash, jaundice


Psychiatric: No suicidal or homicidal ideation





Past History


Past Medical History: ESRD, hypertension, hypothyroidism, renal failure


Past Surgical History: No surgical history


Social history: no significant social history


Family history: hypertension





Medications and Allergies


                                    Allergies











Allergy/AdvReac Type Severity Reaction Status Date / Time


 


NSAIDS (Non-Steroidal Allergy  Rash Verified 08/08/22 13:30





Anti-Inflamma     


 


Sulfa (Sulfonamide Allergy  Rash Verified 08/08/22 13:30





Antibiotics)     











                                Home Medications











 Medication  Instructions  Recorded  Confirmed  Last Taken  Type


 


Ascorbic Acid [Vitamin C] 500 mg PO DAILY 08/08/22 08/08/22 Unknown History


 


Ferrous Sulfate [Ferrous Sulfate 324 mg PO DAILY 08/08/22 08/08/22 Unknown 

History





324 MG]     


 


Levothyroxine Sodium 200 mcg PO DAILY 08/08/22 08/08/22 Unknown History





[Levothyroxine]     


 


Venlafaxine [Effexor 37.5mg tab] 37.5 mg PO QDAY 08/08/22 08/08/22 Unknown 

History


 


carvediloL [Coreg] 12.5 mg PO BID 08/08/22 08/08/22 Unknown History


 


sevelamer HCL [Sevelamer HCl] 800 mg PO TID 08/08/22 08/08/22 Unknown History











Active Meds: 


Active Medications





Acetaminophen (Acetaminophen 325 Mg Tab)  650 mg PO Q4H PRN


   PRN Reason: Pain MILD(1-3)/Fever >100.5/HA


Albuterol (Albuterol 2.5 Mg/3 Ml Nebu)  2.5 mg IH Q3HRT PRN


   PRN Reason: Shortness Of Breath


Albuterol/Ipratropium (Ipratropium/Albuterol Sulfate 3 Ml Ampul.Neb)  1 ampul IH

Q6HRT [facility]


   Last Admin: 08/09/22 02:36 Dose:  1 ampul


   


Ascorbic Acid (Ascorbic Acid 500 Mg Tab)  500 mg PO DAILY [facility]


Azithromycin (Azithromycin 250 Mg Tab)  250 mg PO QDAY [facility]; Protocol


Carvedilol (Carvedilol 12.5 Mg Tab)  12.5 mg PO BID [facility]


Dexamethasone (Dexamethasone 4 Mg/Ml Vial)  8 mg IV DAILY [facility]


Famotidine (Famotidine 20 Mg Tab)  20 mg PO DAILY [facility]


Ferrous Sulfate (Ferrous Sulfate 325 Mg Tab)  325 mg PO DAILY [facility]


Furosemide (Furosemide 40 Mg/4 Ml Inj)  40 mg IV 0600,1800 [facility]


Heparin Sodium (Porcine) (Heparin 5,000 Unit/1 Ml Vial)  5,000 unit SUB-Q Q8HR 

[facility]


   Last Admin: 08/09/22 05:48 Dose:  5,000 unit


   


Levothyroxine Sodium (Levothyroxine 100 Mcg Tab)  200 mcg PO QAM@0600 [facility]


   Last Admin: 08/09/22 05:48 Dose:  200 mcg


   


Morphine Sulfate (Morphine 2 Mg/1 Ml Inj)  2 mg IV Q4H PRN


   PRN Reason: Pain, Moderate (4-6)


Morphine Sulfate (Morphine 4 Mg/1 Ml Inj)  4 mg IV Q4H PRN


   PRN Reason: Pain , Severe (7-10)


Ondansetron HCl (Ondansetron 4 Mg/2 Ml Inj)  4 mg IV Q8H PRN


   PRN Reason: Nausea And Vomiting


Sevelamer Carbonate (Sevelamer Carbonate 800 Mg Tab)  800 mg PO TIDWM [facility]


Sodium Chloride (Sodium Chloride 0.9% 10 Ml Flush Syringe)  10 ml IV BID [facility]


Sodium Chloride (Sodium Chloride 0.9% 10 Ml Flush Syringe)  10 ml IV PRN PRN


   PRN Reason: LINE FLUSH











Physical Examination





- Physical Exam


Narrative exam: 





Physical Exam: 


Constitutional: Alert, cooperative. No acute distress


Head, Ears, Nose: Normocephalic, atraumatic. External ears, nose normal


Eyes: Conjunctivae/corneas clear. No icterus. No ptosis.


Neck: Supple, no meningeal signs


Cardiovascular: S1, S2 +


Respiratory: AE fair bilaterally


GI: Soft, non-tender; bowel sounds normal. No peritoneal signs


Musculoskeletal: No pedal edema, no cyanosis.


Skin: No rash or abscess


Hem/Lymphatic: No palpable cervical or supraclavicular nodes. No lymphangitis


Psych: Mood ok. Affect normal


Neurological: Awake, alert, oriented.





- Constitutional


Vitals: 


                                   Vital Signs











Temp Pulse Resp BP Pulse Ox


 


 98.8 F   81   18   156/83   99 


 


 08/09/22 06:53  08/09/22 06:53  08/09/22 06:53  08/09/22 06:53  08/09/22 06:53








                           Temperature -Last 24 Hours











Temperature                    98.8 F


 


Temperature                    97.4 F


 


Temperature                    99 F

















Results





- Labs


CBC & Chem 7: 


                                 08/08/22 14:49





                                 08/08/22 14:49


Labs: 


                              Abnormal lab results











  08/08/22 08/08/22 08/08/22 Range/Units





  14:49 14:49 14:49 


 


RBC  3.08 L    (3.65-5.03)  M/mm3


 


Hgb  9.6 L    (10.1-14.3)  gm/dl


 


MCV  99 H    (79-97)  fl


 


RDW  17.7 H    (13.2-15.2)  %


 


Seg Neuts % (Manual)  78.0 H    (40.0-70.0)  %


 


Lymphocytes % (Manual)  3.0 L    (13.4-35.0)  %


 


Lymphocytes # (Manual)  0.3 L    (1.2-5.4)  K/mm3


 


Sodium   133 L   (137-145)  mmol/L


 


Carbon Dioxide   21 L   (22-30)  mmol/L


 


BUN   39 H   (7-17)  mg/dL


 


Creatinine   3.7 H   (0.6-1.2)  mg/dL


 


Calcium   8.2 L   (8.4-10.2)  mg/dL


 


Troponin T    0.075 H  (0.00-0.029)  ng/mL


 


NT-Pro-B Natriuret Pep    26132 H  (0-900)  pg/mL


 


Total Protein   5.6 L   (6.3-8.2)  g/dL


 


Albumin   2.8 L   (3.9-5)  g/dL


 


HDL Cholesterol    64 H  (40-59)  mg/dL














- Imaging and Cardiology


Chest x-ray: report reviewed, image reviewed (bibasilar infiltrates. PermCath +)





Assessment and Plan





Cultures:


SARS CoV2 PCR: Pending


8/8/2022 blood culture: In process





A/P:


61-year-old female with ESRD on HD, hypertension, hypothyroidism, nursing home 

resident was brought into the emergency room due to shortness of breath:





#Bilateral pneumonia: Secondary to COVID-19





#Acute hypoxic respiratory failure: Requiring HFNC.





#ESRD on HD: Renally adjust antibiotics





#CHF





Recs:


-IV/PO Dexamethasone x 10 days


-Continue empiric azithromycin for 5 days


-Not a candidate for remdesivir due to renal failure


-since she is requiring HFNC >30 L/min, if CRP >7.5, would be a candidate for 

Actemra 8 mg/kg x 1 (depending on availability)


-prophylactic anticoagulation based on d-dimer per hospital protocol


-trend ferritin, d-dimer, CRP every 2-3 days 





Sneha Thompsno MD, FACP, [facility] ([facility])


O: [phone]


F: [phone]


C: [phone]

## 2022-08-09 NOTE — CONSULTATION
History of Present Illness





- Reason for Consult


Consult date: 08/10/22


end stage renal disease





- History of Present Illness





This is a 61 year-old woman with ESRD who presents for dyspnea  





Patient usually dialyzes Tu[facility]Th[facility]Sa at [facility].  Last HD 8/6.  

Denies any recent issues with HD, including dizziness, lightheadedness, 

cramping, chest pain on HD.





Currently, patient denies any issues including dyspnea, edema, access issues, 

nausea, vomiting, headaches.  Notes having dyspnea on 8/8 which led to hospital 

visit on 8/9, found to have COVID-19, did get HD last night in hospital with no 

issues.  On HFNC this AM and completing albuterol treatment





Past History


Past Medical History: ESRD, hypertension, hypothyroidism, renal failure


Past Surgical History: No surgical history


Social history: no significant social history


Family history: hypertension





Medications and Allergies


                                    Allergies











Allergy/AdvReac Type Severity Reaction Status Date / Time


 


NSAIDS (Non-Steroidal Allergy  Rash Verified 08/08/22 13:30





Anti-Inflamma     


 


Sulfa (Sulfonamide Allergy  Rash Verified 08/08/22 13:30





Antibiotics)     











                                Home Medications











 Medication  Instructions  Recorded  Confirmed  Last Taken  Type


 


Ascorbic Acid [Vitamin C] 500 mg PO DAILY 08/08/22 08/08/22 Unknown History


 


Ferrous Sulfate [Ferrous Sulfate 324 mg PO DAILY 08/08/22 08/08/22 Unknown 

History





324 MG]     


 


Levothyroxine Sodium 200 mcg PO DAILY 08/08/22 08/08/22 Unknown History





[Levothyroxine]     


 


Venlafaxine [Effexor 37.5mg tab] 37.5 mg PO QDAY 08/08/22 08/08/22 Unknown 

History


 


carvediloL [Coreg] 12.5 mg PO BID 08/08/22 08/08/22 Unknown History


 


sevelamer HCL [Sevelamer HCl] 800 mg PO TID 08/08/22 08/08/22 Unknown History











Active Meds: 


Active Medications





Acetaminophen (Acetaminophen 325 Mg Tab)  650 mg PO Q4H PRN


   PRN Reason: Pain MILD(1-3)/Fever >100.5/HA


Albuterol (Albuterol 2.5 Mg/3 Ml Nebu)  2.5 mg IH Q3HRT PRN


   PRN Reason: Shortness Of Breath


Albuterol/Ipratropium (Ipratropium/Albuterol Sulfate 3 Ml Ampul.Neb)  1 ampul IH

Q6HRT [facility]


   Last Admin: 08/09/22 14:41 Dose:  1 ampul


   


Ascorbic Acid (Ascorbic Acid 500 Mg Tab)  500 mg PO DAILY [facility]


   Last Admin: 08/09/22 09:34 Dose:  500 mg


   


Azithromycin (Azithromycin 250 Mg Tab)  250 mg PO QDAY [facility]; Protocol


   Stop: 08/13/22 10:01


   Last Admin: 08/09/22 09:34 Dose:  250 mg


   


Carvedilol (Carvedilol 12.5 Mg Tab)  12.5 mg PO BID [facility]


   Last Admin: 08/09/22 09:34 Dose:  12.5 mg


   


Dexamethasone (Dexamethasone 4 Mg/Ml Vial)  8 mg IV DAILY [facility]


   Stop: 08/18/22 10:01


   Last Admin: 08/09/22 09:33 Dose:  8 mg


   


Famotidine (Famotidine 20 Mg Tab)  20 mg PO DAILY [facility]


   Last Admin: 08/09/22 09:34 Dose:  20 mg


   


Ferrous Sulfate (Ferrous Sulfate 325 Mg Tab)  325 mg PO DAILY [facility]


   Last Admin: 08/09/22 09:34 Dose:  325 mg


   


Furosemide (Furosemide 40 Mg/4 Ml Inj)  40 mg IV 0600,1800 [facility]


   Last Admin: 08/09/22 09:43 Dose:  40 mg


   


Heparin Sodium (Porcine) (Heparin 5,000 Unit/1 Ml Vial)  5,000 unit SUB-Q Q8HR 

[facility]


   Last Admin: 08/09/22 05:48 Dose:  5,000 unit


   


Sodium Chloride (Nacl 0.9%)  100 mls @ 999 mls/hr IV [facility] PRN


   PRN Reason: Hypotension


Levothyroxine Sodium (Levothyroxine 100 Mcg Tab)  200 mcg PO QAM@0600 [facility]


   Last Admin: 08/09/22 05:48 Dose:  200 mcg


   


Morphine Sulfate (Morphine 2 Mg/1 Ml Inj)  2 mg IV Q4H PRN


   PRN Reason: Pain, Moderate (4-6)


Morphine Sulfate (Morphine 4 Mg/1 Ml Inj)  4 mg IV Q4H PRN


   PRN Reason: Pain , Severe (7-10)


Ondansetron HCl (Ondansetron 4 Mg/2 Ml Inj)  4 mg IV Q8H PRN


   PRN Reason: Nausea And Vomiting


Sevelamer Carbonate (Sevelamer Carbonate 800 Mg Tab)  800 mg PO TIDWM [facility]


   Last Admin: 08/09/22 09:34 Dose:  800 mg


   


Sodium Chloride (Sodium Chloride 0.9% 10 Ml Flush Syringe)  10 ml IV BID [facility]


   Last Admin: 08/09/22 09:44 Dose:  10 ml


   


Sodium Chloride (Sodium Chloride 0.9% 10 Ml Flush Syringe)  10 ml IV PRN PRN


   PRN Reason: LINE FLUSH











Review of Systems


All systems: negative (as per HPI)





Exam





- Vital Signs


Vital signs: 


                                   Vital Signs











Temp Pulse Resp BP Pulse Ox


 


 99 F   92 H  18   130/73   94 


 


 08/08/22 13:22  08/08/22 13:22  08/08/22 13:22  08/08/22 13:22  08/08/22 13:22














- Physical Exam


Narrative exam: 





Constitutional: no acute distress


Head: NC/AT


Neck: supple


Lungs: clear to auscultation


CV: RRR, no M/R/G


Abdomen: soft, non-tender, bowel sounds present


Back: nontender


Extremities: no edema, pulses WNL


Skin: intact


Neuro: no focal deficits, alert and oriented x4





Results





- Lab Results





                                 08/10/22 07:21





                                 08/10/22 07:21


                             Most recent lab results











Calcium  8.7 mg/dL (8.4-10.2)   08/09/22  08:04    














Assessment and Plan





This is a 61 year old woman who presents with dyspnea, COVID-19





# ESRD: continue HD Tu/Th/Sa or prn, had HD 8/9 evening with UF ~2L


- daily labs


- renally dose meds


- avoid nephrotoxins


- renal diet


- verbal consent obtained for HD





# Anemia: last hemoglobin 9.6->8.8, ESAs with HD TIW  





# HTN: UF as tolerated.  BP stable





# Secondary Hyperparathyroidism: continue home binders as needed, vitamin D 

analogs prn





# COVID-19 Pneumonia, Respiratory Failure: on HFNC 80% Fio2, pulmonary/ID also 

following





# CHF: note BNP 13K on admission, UF as tolerated

## 2022-08-09 NOTE — VASCULAR LAB REPORT
DUPLEX DOPPLER LOWER EXTREMITY VEINS, BILATERAL



INDICATION / CLINICAL INFORMATION: Evaluate for possible DVT.



TECHNIQUE: Duplex doppler imaging was performed through the veins of both lower extremities using ramirez
ous compression and other maneuvers.



COMPARISON: None available.



FINDINGS:

RIGHT COMMON FEMORAL VEIN: Negative.

RIGHT FEMORAL VEIN: Negative.

RIGHT POPLITEAL VEIN: Negative.

RIGHT CALF VEINS: Negative.



LEFT COMMON FEMORAL VEIN: Negative.

LEFT FEMORAL VEIN: Negative.

LEFT POPLITEAL VEIN: Negative.

LEFT CALF VEINS: Negative.



ADDITIONAL FINDINGS: None.



IMPRESSION:

1. No sonographic evidence for DVT in either lower extremity.



Signer Name: Rakan Ricks MD 

Signed: 8/9/2022 5:53 PM

Workstation Name: [id number]

## 2022-08-09 NOTE — PROGRESS NOTE
Assessment and Plan


Assessment and plan: 








#COVID-19 pneumonia


#Acute hypoxic respiratory failure


- etiology: Secondary to COVID-19 pneumonia + possible acute heart failure


- baseline oxygen requirements: Room air


- supplemental oxygen: High flow nasal cannula 30 L / 90% FiO2


- Continue protocol: continue pulse oximetry, wean oxygen as tolerated, 

dexamethasone 8 mg daily x10 days, azithromycin 250 mg daily x5 days (completes 

on 8/12/2022), airborne and droplet precautions


-Infectious disease consulted; appreciate recs.  Patient not currently candidate

for remdesivir given renal function.


 Pulmonology consulted; pending recs


- continue to monitor





#Acute heart failure


- Continue CHF exacerbation protocol: Telemetry, Strict I/O, monitor urine 

output every shift, daily weights, afterload reduction, low-sodium diet, and 

fluid restriction of approximately 1.5 mL/day, IV Lasix 40 mg twice daily


- Supplemental oxygen: High flow nasal cannula 30 L / 90% FiO2


- ProBNP on admission: 13,672


- Pending TTE to evaluate cardiac function


- Continue to monitor





#Elevated D-dimer


 D-dimer 1044


 Ordering bilateral venous Dopplers to evaluate for possible DVT.  Continue to 

monitor.





#ESRD on hemodialysis


-Access: Permacath in right upper chest


-Outpatient schedule: Unknown


-HD center: N/A


-Nephrology consulted; appreciate recs. 


-Renally dose medications and avoid nephrotoxic drugs. Renal diet.





#Hypothyroidism


 Continue home levothyroxine 200 mcg daily





#Mild protein caloric malnutrition


 Albumin 2.5


 Starting dietary supplementation








Critical Care Billing:


The high probability of a clinically significant, sudden or life threatening 

deterioration of the [respiratory] system(s) required my full and direct 

attention, intervention and personal management. The aggregate critical care 

time was [60] minutes. This time is in addition to time spent performing 

reported procedures but includes the following: 





[x] Data Review and interpretation 





[x] Patient assessment and monitoring of vital signs 





[x] Documentation 





[x] Medication orders and management


Disposition Plan: Continue medical management


Total Time Spent with Patient (Minutes): 60 min 





History


Interval history: 





No acute events overnight. 





Hospitalist Physical





- Constitutional


Vitals: 


                                        











Temp Pulse Resp BP Pulse Ox


 


 98.9 F   84   18   157/87   98 


 


 08/09/22 10:47  08/09/22 10:47  08/09/22 10:47  08/09/22 10:47  08/09/22 10:47











General appearance: Present: no acute distress, well-nourished





- EENT


Eyes: Present: PERRL, EOM intact


ENT: hearing intact, clear oral mucosa





- Neck


Neck: Present: supple, normal ROM, other (Permacath in right upper chest)





- Respiratory


Respiratory effort: labored


Respiratory: bilateral: diminished (on HFNC 30L/90 FiO2)





- Cardiovascular


Rhythm: regular


Heart Sounds: Present: S1 & S2





- Extremities


Extremities: no ischemia, pulses intact, pulses symmetrical, normal temperature,

 normal color


Extremity abnormal: edema


Peripheral Pulses: within normal limits





- Abdominal


General gastrointestinal: soft, non-tender, non-distended, normal bowel sounds





- Integumentary


Integumentary: Present: clear, warm, dry





- Psychiatric


Psychiatric: appropriate mood/affect, cooperative





- Neurologic


Neurologic: CNII-XII intact





- Allied Health


Allied health notes reviewed: nursing





HEART Score





- HEART Score


Troponin: 


                                        











Troponin T  0.075 ng/mL (0.00-0.029)  H  08/08/22  14:49    














Results





- Labs


CBC & Chem 7: 


                                 08/08/22 14:49





                                 08/09/22 08:04


Labs: 


                             Laboratory Last Values











WBC  8.4 K/mm3 (4.5-11.0)   08/08/22  14:49    


 


RBC  3.08 M/mm3 (3.65-5.03)  L  08/08/22  14:49    


 


Hgb  9.6 gm/dl (10.1-14.3)  L  08/08/22  14:49    


 


Hct  30.4 % (30.3-42.9)   08/08/22  14:49    


 


MCV  99 fl (79-97)  H  08/08/22  14:49    


 


MCH  31 pg (28-32)   08/08/22  14:49    


 


MCHC  31 % (30-34)   08/08/22  14:49    


 


RDW  17.7 % (13.2-15.2)  H  08/08/22  14:49    


 


Plt Count  187 K/mm3 (140-440)   08/08/22  14:49    


 


Baso % (Auto)  Np   08/08/22  14:49    


 


Add Manual Diff  Complete   08/08/22  14:49    


 


Total Counted  100   08/08/22  14:49    


 


Seg Neuts % (Manual)  78.0 % (40.0-70.0)  H  08/08/22  14:49    


 


Band Neutrophils %  14.0 %  08/08/22  14:49    


 


Lymphocytes % (Manual)  3.0 % (13.4-35.0)  L  08/08/22  14:49    


 


Reactive Lymphs % (Man)  0 %  08/08/22  14:49    


 


Monocytes % (Manual)  5.0 % (0.0-7.3)   08/08/22  14:49    


 


Eosinophils % (Manual)  0 % (0.0-4.3)   08/08/22  14:49    


 


Basophils % (Manual)  0 % (0.0-1.8)   08/08/22  14:49    


 


Metamyelocytes %  0 %  08/08/22  14:49    


 


Myelocytes %  0 %  08/08/22  14:49    


 


Promyelocytes %  0 %  08/08/22  14:49    


 


Blast Cells %  0 %  08/08/22  14:49    


 


Nucleated RBC %  Not Reportable   08/08/22  14:49    


 


Seg Neutrophils # Man  6.6 K/mm3 (1.8-7.7)   08/08/22  14:49    


 


Band Neutrophils #  1.2 K/mm3  08/08/22  14:49    


 


Lymphocytes # (Manual)  0.3 K/mm3 (1.2-5.4)  L  08/08/22  14:49    


 


Abs React Lymphs (Man)  0.0 K/mm3  08/08/22  14:49    


 


Monocytes # (Manual)  0.4 K/mm3 (0.0-0.8)   08/08/22  14:49    


 


Eosinophils # (Manual)  0.0 K/mm3 (0.0-0.4)   08/08/22  14:49    


 


Basophils # (Manual)  0.0 K/mm3 (0.0-0.1)   08/08/22  14:49    


 


Metamyelocytes #  0.0 K/mm3  08/08/22  14:49    


 


Myelocytes #  0.0 K/mm3  08/08/22  14:49    


 


Promyelocytes #  0.0 K/mm3  08/08/22  14:49    


 


Blast Cells #  0.0 K/mm3  08/08/22  14:49    


 


WBC Morphology  Not Reportable   08/08/22  14:49    


 


Hypersegmented Neuts  Not Reportable   08/08/22  14:49    


 


Hyposegmented Neuts  Not Reportable   08/08/22  14:49    


 


Hypogranular Neuts  Not Reportable   08/08/22  14:49    


 


Smudge Cells  Not Reportable   08/08/22  14:49    


 


Toxic Granulation  Not Reportable   08/08/22  14:49    


 


Toxic Vacuolation  Not Reportable   08/08/22  14:49    


 


Dohle Bodies  Not Reportable   08/08/22  14:49    


 


Pelger-Huet Anomaly  Not Reportable   08/08/22  14:49    


 


Komal Rods  Not Reportable   08/08/22  14:49    


 


Platelet Estimate  Consistent w auto   08/08/22  14:49    


 


Clumped Platelets  Not Reportable   08/08/22  14:49    


 


Plt Clumps, EDTA  Not Reportable   08/08/22  14:49    


 


Large Platelets  Not Reportable   08/08/22  14:49    


 


Giant Platelets  Not Reportable   08/08/22  14:49    


 


Platelet Satelliting  Not Reportable   08/08/22  14:49    


 


Plt Morphology Comment  Not Reportable   08/08/22  14:49    


 


RBC Morphology  Not Reportable   08/08/22  14:49    


 


Dimorphic RBCs  Not Reportable   08/08/22  14:49    


 


Polychromasia  Not Reportable   08/08/22  14:49    


 


Hypochromasia  Not Reportable   08/08/22  14:49    


 


Poikilocytosis  Not Reportable   08/08/22  14:49    


 


Anisocytosis  1+   08/08/22  14:49    


 


Microcytosis  Not Reportable   08/08/22  14:49    


 


Macrocytosis  Not Reportable   08/08/22  14:49    


 


Spherocytes  Not Reportable   08/08/22  14:49    


 


Pappenheimer Bodies  Not Reportable   08/08/22  14:49    


 


Sickle Cells  Not Reportable   08/08/22  14:49    


 


Target Cells  Not Reportable   08/08/22  14:49    


 


Tear Drop Cells  Not Reportable   08/08/22  14:49    


 


Ovalocytes  Not Reportable   08/08/22  14:49    


 


Helmet Cells  Not Reportable   08/08/22  14:49    


 


Price-Oak Bodies  Not Reportable   08/08/22  14:49    


 


Cabot Rings  Not Reportable   08/08/22  14:49    


 


Angelic Cells  Not Reportable   08/08/22  14:49    


 


Bite Cells  Not Reportable   08/08/22  14:49    


 


Crenated Cell  Not Reportable   08/08/22  14:49    


 


Elliptocytes  Not Reportable   08/08/22  14:49    


 


Acanthocytes (Spur)  Not Reportable   08/08/22  14:49    


 


Rouleaux  Not Reportable   08/08/22  14:49    


 


Hemoglobin C Crystals  Not Reportable   08/08/22  14:49    


 


Schistocytes  Not Reportable   08/08/22  14:49    


 


Malaria parasites  Not Reportable   08/08/22  14:49    


 


Sarabjit Bodies  Not Reportable   08/08/22  14:49    


 


Hem Pathologist Commnt  No   08/08/22  14:49    


 


PT  13.5 Sec. (12.2-14.9)   08/08/22  14:49    


 


INR  0.91  (0.87-1.13)   08/08/22  14:49    


 


APTT  33.8 Sec. (24.2-36.6)   08/08/22  14:49    


 


D-Dimer  1044.30 ng/mlDDU (0-234)  H  08/09/22  08:04    


 


Sodium  137 mmol/L (137-145)   08/09/22  08:04    


 


Potassium  4.4 mmol/L (3.6-5.0)   08/09/22  08:04    


 


Chloride  99.1 mmol/L ()   08/09/22  08:04    


 


Carbon Dioxide  27 mmol/L (22-30)   08/09/22  08:04    


 


Anion Gap  15 mmol/L  08/09/22  08:04    


 


BUN  48 mg/dL (7-17)  H  08/09/22  08:04    


 


Creatinine  4.0 mg/dL (0.6-1.2)  H  08/09/22  08:04    


 


Estimated GFR  11 ml/min  08/09/22  08:04    


 


BUN/Creatinine Ratio  12 %  08/09/22  08:04    


 


Glucose  147 mg/dL ()  H  08/09/22  08:04    


 


Calcium  8.7 mg/dL (8.4-10.2)   08/09/22  08:04    


 


Ferritin  458.0 ng/mL (10.0-200.0)  H  08/09/22  08:04    


 


Total Bilirubin  0.20 mg/dL (0.1-1.2)   08/08/22  14:49    


 


AST  26 units/L (5-40)   08/08/22  14:49    


 


ALT  14 units/L (7-56)   08/08/22  14:49    


 


Alkaline Phosphatase  70 units/L ()   08/08/22  14:49    


 


Troponin T  0.075 ng/mL (0.00-0.029)  H  08/08/22  14:49    


 


C-Reactive Protein  10.50 mg/dL (0.00-1.30)  H  08/09/22  08:04    


 


NT-Pro-B Natriuret Pep  13673 pg/mL (0-900)  H  08/08/22  14:49    


 


Total Protein  5.6 g/dL (6.3-8.2)  L  08/08/22  14:49    


 


Albumin  2.8 g/dL (3.9-5)  L  08/08/22  14:49    


 


Albumin/Globulin Ratio  1.0 %  08/08/22  14:49    


 


Triglycerides  128 mg/dL (2-149)   08/08/22  14:49    


 


Cholesterol  154 mg/dL ()   08/08/22  14:49    


 


LDL Cholesterol Direct  63 mg/dL ()   08/08/22  14:49    


 


HDL Cholesterol  64 mg/dL (40-59)  H  08/08/22  14:49    


 


Cholesterol/HDL Ratio  2.40 %  08/08/22  14:49    


 


Hepatitis A IgM Ab  Non-reactive  (NonReactive)   08/09/22  10:27    


 


Hep Bs Antigen  Non-reactive  (Negative)   08/09/22  10:27    


 


Hep B Core IgM Ab  Non-reactive  (NonReactive)   08/09/22  10:27    


 


Hepatitis C Antibody  Non-reactive  (NonReactive)   08/09/22  10:27    











Microbiology: 


Microbiology





08/08/22 16:49   Peripheral/Venous   Blood Culture - Preliminary


                            Culture in Progress


08/08/22 16:49   Peripheral/Venous   Blood Culture - Preliminary


                            Culture in Progress








Chang/IV: 


                                        





Voiding Method                   External Female Catheter











Active Medications





- Current Medications


Current Medications: 














Generic Name Dose Route Start Last Admin





  Trade Name Freq  PRN Reason Stop Dose Admin


 


Acetaminophen  650 mg  08/08/22 23:21 





  Acetaminophen 325 Mg Tab  PO  





  Q4H PRN  





  Pain MILD(1-3)/Fever >100.5/HA  


 


Albuterol  2.5 mg  08/08/22 23:21 





  Albuterol 2.5 Mg/3 Ml Nebu  IH  





  Q3HRT PRN  





  Shortness Of Breath  


 


Albuterol/Ipratropium  1 ampul  08/09/22 02:00  08/09/22 08:55





  Ipratropium/Albuterol Sulfate 3 Ml Ampul.Neb  IH   Not Given





  Q6HRT BERHANE  


 


Ascorbic Acid  500 mg  08/09/22 10:00  08/09/22 09:34





  Ascorbic Acid 500 Mg Tab  PO   500 mg





  DAILY BERHANE   Administration


 


Azithromycin  250 mg  08/09/22 10:00  08/09/22 09:34





  Azithromycin 250 Mg Tab  PO  08/13/22 10:01  250 mg





  QDAY BERHANE   Administration





  Protocol  


 


Carvedilol  12.5 mg  08/09/22 10:00  08/09/22 09:34





  Carvedilol 12.5 Mg Tab  PO   12.5 mg





  BID BERHANE   Administration


 


Dexamethasone  8 mg  08/09/22 10:00  08/09/22 09:33





  Dexamethasone 4 Mg/Ml Vial  IV  08/18/22 10:01  8 mg





  DAILY BERHANE   Administration


 


Famotidine  20 mg  08/09/22 10:00  08/09/22 09:34





  Famotidine 20 Mg Tab  PO   20 mg





  DAILY BERHANE   Administration


 


Ferrous Sulfate  325 mg  08/09/22 10:00  08/09/22 09:34





  Ferrous Sulfate 325 Mg Tab  PO   325 mg





  DAILY BERHANE   Administration


 


Furosemide  40 mg  08/09/22 07:15  08/09/22 09:43





  Furosemide 40 Mg/4 Ml Inj  IV   40 mg





  0600,1800 BERHANE   Administration


 


Heparin Sodium (Porcine)  5,000 unit  08/09/22 06:00  08/09/22 05:48





  Heparin 5,000 Unit/1 Ml Vial  SUB-Q   5,000 unit





  Q8HR BERHANE   Administration


 


Sodium Chloride  100 mls @ 999 mls/hr  08/09/22 10:30 





  Nacl 0.9%  IV  





  NASH PRN  





  Hypotension  


 


Levothyroxine Sodium  200 mcg  08/09/22 06:00  08/09/22 05:48





  Levothyroxine 100 Mcg Tab  PO   200 mcg





  QAM@0600 BERHANE   Administration


 


Morphine Sulfate  2 mg  08/08/22 23:21 





  Morphine 2 Mg/1 Ml Inj  IV  





  Q4H PRN  





  Pain, Moderate (4-6)  


 


Morphine Sulfate  4 mg  08/08/22 23:21 





  Morphine 4 Mg/1 Ml Inj  IV  





  Q4H PRN  





  Pain , Severe (7-10)  


 


Ondansetron HCl  4 mg  08/08/22 23:21 





  Ondansetron 4 Mg/2 Ml Inj  IV  





  Q8H PRN  





  Nausea And Vomiting  


 


Sevelamer Carbonate  800 mg  08/09/22 08:00  08/09/22 09:34





  Sevelamer Carbonate 800 Mg Tab  PO   800 mg





  TIDWM BERHANE   Administration


 


Sodium Chloride  10 ml  08/09/22 10:00  08/09/22 09:44





  Sodium Chloride 0.9% 10 Ml Flush Syringe  IV   10 ml





  BID BERHANE   Administration


 


Sodium Chloride  10 ml  08/08/22 23:21 





  Sodium Chloride 0.9% 10 Ml Flush Syringe  IV  





  PRN PRN  





  LINE FLUSH  














Nutrition/Malnutrition Assess





- Dietary Evaluation


Nutrition/Malnutrition Findings: 


                                        





Nutrition Notes                                            Start:  08/09/22 

12:04


Freq:                                                      Status: Active       

 


Protocol:                                                                       

 


 Document     08/09/22 12:04  [place]  (Rec: 08/09/22 12:32  [place]IN  [id number])


 Nutrition Notes


     Need for Assessment generated from:         MD Order


     Initial or Follow up                        Assessment


     Current Diagnosis                           CKD (stage V CKD),Hypertension


                                                 ,Respiratory Failure


     Other Pertinent Diagnosis                   ESRD+HD, COVID-19/Pneumonia,


                                                 CHF, Hypothyroidism.


     Current Diet                                Cardiac Diet (since B 08/08),


                                                 Cardiac -Renal- Diet+D Suppl (


                                                 from D 08/09).


     Labs/Tests                                  08/09: BUN 48, Crea 4.0, Glu


                                                 147.


     Pertinent Medications                       08/09: Vit C, FeSO4,


                                                 Levothyroxine, Renvela, others


                                                 nutritionally unremarkable.


     Height                                      5 ft 3 in


     Weight                                      72.575 kg


     Ideal Body Weight (kg)                      52.27


     BMI                                         28.3


     Intake Prior to Admission                   Good


     Weight change and time frame                Pt denies having loss body


                                                 weight PTA.


     Weight Status                               Overweight


     Subjective/Other Information                RD consult for dietary


                                                 supplementation assessment.


                                                 No reports available on Pt's


                                                 PO intake of meals at the time


                                                 , will assess at F/U.


                                                 I will prescribe dietary


                                                 supplements to compensate for


                                                 possible poor or insufficient


                                                 PO intake of meals during LOS.


                                                 I will recommend renal


                                                 restriction to current diet,


                                                 to support Pt's ESRD condition


                                                 during LOS.


                                                 Pt is on High Flow Nasal


                                                 Cannula, O2 saturation @ 91%,


                                                 according to Physical


                                                 Assessment History notes.


                                                 Pt has missing teeth,


                                                 according to Physical


                                                 Assessment History notes.


                                                 Pt is a SNF resident,


                                                 according to Progress notes.


     Percent of energy/protein needs met:        Prescribed Cardiac -Renal-


                                                 Diet provides for energy/


                                                 protein needs (2,230 Kcal/85 g


                                                 ) during LOS; additionally,


                                                 Dietary Supplements will


                                                 compensate for possible poor


                                                 or insufficient PO intake of


                                                 meals with 850 Kcal and 38 g


                                                 of protein.


     Burn                                        Absent


     Trauma                                      Absent


     GI Symptoms                                 None


     Food Allergy                                No


     Skin Integrity/Comment                      Assessment WNL.


     Minimum of two criteria                     No


     Fluid Accumulation                          N/A


     Reduced  Strength                       N/A (non-severe)


     Protein-Calorie Malnutrition                N\A


     #2


      Nutrition Diagnosis                        Altered nutrition-related


                                                 laboratory values


      Etiology                                   CKD V, ESRD.


      As Evidenced by Signs and Symptoms         08/09: BUN 48, Crea 4.0, Glu


                                                 147.


     #1


      Nutrition Diagnosis                        Predicted suboptimal energy


                                                 intake


      Etiology                                   Ongoing and concomitant


                                                 chronic metabolic conditions.


      As Evidenced by Signs and Symptoms         No reports available on Pt's


                                                 PO intake of meals at the time


                                                 , MD request for dietary


                                                 supplements.


     Is patient on ventilator?                   No


     Is Patient Ambulatory and/or Out of Bed     No


     REE-(Posey-St. Jeor-confined to bed)      1528.644


     Kcal/Kg value to use for calculation        19


     Approximate Energy Requirements Using       1379


      kcal/Kg                                    


     Calculation Used for Recommendations        Kcal/kg


     Additional Notes                            Protein: >1.2 g/Kg ABW; >88 g/


                                                 day.


                                                 Fluids: 1 ml/Kcal, or as per


                                                 MD.


 Nutrition Intervention


     Change Diet Order:                          Add Renal restriction to


                                                 Cardiac Diet, and continue as


                                                 tolerated.


     Add Supplement/Snack (indicate name/kcal    Start 8 fl oz Nepro w/


      /protein )                                 CARBSTEADY; BID


     Provides kCal:                              850


     Provides Protein (gm)                       38


     Goal #1                                     Compensate, through dietary


                                                 supplementation, for possible


                                                 poor or insufficient PO intake


                                                 of meals during LOS.


     Goal #2                                     Help reach and maintain


                                                 acceptable chemistry lab


                                                 values during LOS.


     Goal #3                                     Adjust the dietary


                                                 intervention to better serve


                                                 Pt's needs and clinical


                                                 conditions during LOS.


     Follow-Up By:                               08/16/22


     Additional Comments                         Continue monitoring food


                                                 tolerance, %PO intake of meals


                                                 , dietary supplements, and BM.

## 2022-08-10 LAB
ANISOCYTOSIS BLD QL SMEAR: (no result)
BAND NEUTROPHILS # (MANUAL): 0 K/MM3
BUN SERPL-MCNC: 29 MG/DL (ref 7–17)
BUN/CREAT SERPL: 11 %
CALCIUM SERPL-MCNC: 8.2 MG/DL (ref 8.4–10.2)
HCT VFR BLD CALC: 27.9 % (ref 30.3–42.9)
HEMOLYSIS INDEX: 1
HGB BLD-MCNC: 8.8 GM/DL (ref 10.1–14.3)
MCHC RBC AUTO-ENTMCNC: 32 % (ref 30–34)
MCV RBC AUTO: 98 FL (ref 79–97)
MYELOCYTES # (MANUAL): 0.1 K/MM3
PLATELET # BLD: 182 K/MM3 (ref 140–440)
PROMYELOCYTES # (MANUAL): 0 K/MM3
RBC # BLD AUTO: 2.83 M/MM3 (ref 3.65–5.03)
TOTAL CELLS COUNTED BLD: 100

## 2022-08-10 PROCEDURE — XW033H5 INTRODUCTION OF TOCILIZUMAB INTO PERIPHERAL VEIN, PERCUTANEOUS APPROACH, NEW TECHNOLOGY GROUP 5: ICD-10-PCS | Performed by: HOSPITALIST

## 2022-08-10 RX ADMIN — SEVELAMER CARBONATE SCH MG: 800 TABLET, FILM COATED ORAL at 12:08

## 2022-08-10 RX ADMIN — HEPARIN SODIUM SCH UNIT: 5000 INJECTION, SOLUTION INTRAVENOUS; SUBCUTANEOUS at 06:31

## 2022-08-10 RX ADMIN — IPRATROPIUM BROMIDE AND ALBUTEROL SULFATE SCH AMPUL: .5; 3 SOLUTION RESPIRATORY (INHALATION) at 13:40

## 2022-08-10 RX ADMIN — HEPARIN SODIUM SCH UNIT: 5000 INJECTION, SOLUTION INTRAVENOUS; SUBCUTANEOUS at 18:25

## 2022-08-10 RX ADMIN — FUROSEMIDE SCH MG: 10 INJECTION, SOLUTION INTRAVENOUS at 18:25

## 2022-08-10 RX ADMIN — FAMOTIDINE SCH MG: 20 TABLET ORAL at 10:04

## 2022-08-10 RX ADMIN — SEVELAMER CARBONATE SCH MG: 800 TABLET, FILM COATED ORAL at 18:25

## 2022-08-10 RX ADMIN — OXYCODONE HYDROCHLORIDE AND ACETAMINOPHEN SCH MG: 500 TABLET ORAL at 10:04

## 2022-08-10 RX ADMIN — FERROUS SULFATE TAB 325 MG (65 MG ELEMENTAL FE) SCH MG: 325 (65 FE) TAB at 10:04

## 2022-08-10 RX ADMIN — Medication SCH ML: at 10:04

## 2022-08-10 RX ADMIN — SEVELAMER CARBONATE SCH MG: 800 TABLET, FILM COATED ORAL at 10:04

## 2022-08-10 RX ADMIN — AZITHROMYCIN SCH MG: 250 TABLET, FILM COATED ORAL at 10:04

## 2022-08-10 RX ADMIN — IPRATROPIUM BROMIDE AND ALBUTEROL SULFATE SCH AMPUL: .5; 3 SOLUTION RESPIRATORY (INHALATION) at 20:30

## 2022-08-10 RX ADMIN — FUROSEMIDE SCH MG: 10 INJECTION, SOLUTION INTRAVENOUS at 06:30

## 2022-08-10 RX ADMIN — LEVOTHYROXINE SODIUM SCH MCG: 0.1 TABLET ORAL at 06:28

## 2022-08-10 RX ADMIN — GUAIFENESIN PRN MG: 100 SOLUTION ORAL at 18:33

## 2022-08-10 RX ADMIN — GUAIFENESIN PRN MG: 100 SOLUTION ORAL at 06:28

## 2022-08-10 RX ADMIN — HEPARIN SODIUM SCH UNIT: 5000 INJECTION, SOLUTION INTRAVENOUS; SUBCUTANEOUS at 23:07

## 2022-08-10 RX ADMIN — IPRATROPIUM BROMIDE AND ALBUTEROL SULFATE SCH AMPUL: .5; 3 SOLUTION RESPIRATORY (INHALATION) at 08:51

## 2022-08-10 RX ADMIN — Medication SCH ML: at 21:46

## 2022-08-10 NOTE — CONSULTATION
History of Present Illness


Consult date: 08/10/22


Requesting physician: DIANNA HERNÁNDEZ


History of present illness: 





This is a 61-year-old female with a history of end-stage renal disease (dialysis

dependent) who lives in a nursing home for rehabilitation for the last 2 weeks. 

Patient presents to the emergency department complaining of shortness of breath 

which started approximately 2 days ago.  Patient reports a "wet" cough, that she

still is unable to expectorate.  Patient denies chest pain, fever, chills.  

Patient denies worsening of lower extremity edema or lower extremity pain.  

Patient reports that exertion and movement makes the shortness of breath worse, 

and the oxygen that she was put on prior to coming to the emergency department 

has helped with her shortness of breath.  Patient was placed on a nonrebreather 

at the nursing home, as she was hypoxic at the nursing home and remained hypoxic

abnormality.  Patient is not home O2 dependent.  Patient has not missed any dial

ysis, and her last one was on Saturday.  Patient was tested for and confirmed 

positive for COVID-19, today.





Past History


Past Medical History: ESRD, hypertension, hypothyroidism, renal failure


Past Surgical History: No surgical history


Social history: no significant social history


Family history: hypertension





Medications and Allergies


                                    Allergies











Allergy/AdvReac Type Severity Reaction Status Date / Time


 


NSAIDS (Non-Steroidal Allergy  Rash Verified 08/08/22 13:30





Anti-Inflamma     


 


Sulfa (Sulfonamide Allergy  Rash Verified 08/08/22 13:30





Antibiotics)     











                                Home Medications











 Medication  Instructions  Recorded  Confirmed  Last Taken  Type


 


Ascorbic Acid [Vitamin C] 500 mg PO DAILY 08/08/22 08/08/22 Unknown History


 


Ferrous Sulfate [Ferrous Sulfate 324 mg PO DAILY 08/08/22 08/08/22 Unknown 

History





324 MG]     


 


Levothyroxine Sodium 200 mcg PO DAILY 08/08/22 08/08/22 Unknown History





[Levothyroxine]     


 


Venlafaxine [Effexor 37.5mg tab] 37.5 mg PO QDAY 08/08/22 08/08/22 Unknown 

History


 


carvediloL [Coreg] 12.5 mg PO BID 08/08/22 08/08/22 Unknown History


 


sevelamer HCL [Sevelamer HCl] 800 mg PO TID 08/08/22 08/08/22 Unknown History











Active Meds: 


Active Medications





Acetaminophen (Acetaminophen 325 Mg Tab)  650 mg PO Q4H PRN


   PRN Reason: Pain MILD(1-3)/Fever >100.5/HA


   Last Admin: 08/09/22 23:29 Dose:  650 mg


   


Albuterol (Albuterol 2.5 Mg/3 Ml Nebu)  2.5 mg IH Q3HRT PRN


   PRN Reason: Shortness Of Breath


Albuterol/Ipratropium (Ipratropium/Albuterol Sulfate 3 Ml Ampul.Neb)  1 ampul IH

TIDRT [facility]


   Last Admin: 08/09/22 20:19 Dose:  1 ampul


   


Ascorbic Acid (Ascorbic Acid 500 Mg Tab)  500 mg PO DAILY [facility]


   Last Admin: 08/09/22 09:34 Dose:  500 mg


   


Azithromycin (Azithromycin 250 Mg Tab)  250 mg PO QDAY [facility]; Protocol


   Stop: 08/13/22 10:01


   Last Admin: 08/09/22 09:34 Dose:  250 mg


   


Carvedilol (Carvedilol 12.5 Mg Tab)  12.5 mg PO BID [facility]


   Last Admin: 08/09/22 23:27 Dose:  12.5 mg


   


Dexamethasone (Dexamethasone 4 Mg/Ml Vial)  8 mg IV DAILY [facility]


   Stop: 08/18/22 10:01


   Last Admin: 08/09/22 09:33 Dose:  8 mg


   


Famotidine (Famotidine 20 Mg Tab)  20 mg PO DAILY [facility]


   Last Admin: 08/09/22 09:34 Dose:  20 mg


   


Ferrous Sulfate (Ferrous Sulfate 325 Mg Tab)  325 mg PO DAILY [facility]


   Last Admin: 08/09/22 09:34 Dose:  325 mg


   


Furosemide (Furosemide 40 Mg/4 Ml Inj)  40 mg IV 0600,1800 [facility]


   Last Admin: 08/09/22 18:16 Dose:  40 mg


   


Guaifenesin (Guaifenesin 100 Mg/5 Ml Oral Liqd)  200 mg PO Q4H PRN


   PRN Reason: Cough


   Last Admin: 08/09/22 23:30 Dose:  200 mg


   


Heparin Sodium (Porcine) (Heparin 5,000 Unit/1 Ml Vial)  5,000 unit SUB-Q Q8HR 

[facility]


   Last Admin: 08/09/22 23:28 Dose:  5,000 unit


   


Sodium Chloride (Nacl 0.9%)  100 mls @ 999 mls/hr IV NASH PRN


   PRN Reason: Hypotension


Levothyroxine Sodium (Levothyroxine 100 Mcg Tab)  200 mcg PO QAM@0600 [facility]


   Last Admin: 08/09/22 05:48 Dose:  200 mcg


   


Morphine Sulfate (Morphine 2 Mg/1 Ml Inj)  2 mg IV Q4H PRN


   PRN Reason: Pain, Moderate (4-6)


Morphine Sulfate (Morphine 4 Mg/1 Ml Inj)  4 mg IV Q4H PRN


   PRN Reason: Pain , Severe (7-10)


Ondansetron HCl (Ondansetron 4 Mg/2 Ml Inj)  4 mg IV Q8H PRN


   PRN Reason: Nausea And Vomiting


Sevelamer Carbonate (Sevelamer Carbonate 800 Mg Tab)  800 mg PO TIDWM [facility]


   Last Admin: 08/09/22 18:17 Dose:  800 mg


   


Sodium Chloride (Sodium Chloride 0.9% 10 Ml Flush Syringe)  10 ml IV BID [facility]


   Last Admin: 08/09/22 23:28 Dose:  10 ml


   


Sodium Chloride (Sodium Chloride 0.9% 10 Ml Flush Syringe)  10 ml IV PRN PRN


   PRN Reason: LINE FLUSH











Physical Examination


Vital signs: 


                                   Vital Signs











Temp Pulse Resp BP Pulse Ox


 


 99 F   92 H  18   130/73   94 


 


 08/08/22 13:22  08/08/22 13:22  08/08/22 13:22  08/08/22 13:22  08/08/22 13:22














Results





- Laboratory Findings


CBC and BMP: 


                                 08/10/22 07:21





                                 08/10/22 07:21


PT/INR, D-dimer











PT  13.5 Sec. (12.2-14.9)   08/08/22  14:49    


 


INR  0.91  (0.87-1.13)   08/08/22  14:49    


 


D-Dimer  1044.30 ng/mlDDU (0-234)  H  08/09/22  08:04    








Abnormal lab findings: 


                                  Abnormal Labs











  08/08/22 08/08/22 08/08/22





  14:49 14:49 14:49


 


RBC  3.08 L  


 


Hgb  9.6 L  


 


MCV  99 H  


 


RDW  17.7 H  


 


Seg Neuts % (Manual)  78.0 H  


 


Lymphocytes % (Manual)  3.0 L  


 


Lymphocytes # (Manual)  0.3 L  


 


D-Dimer   


 


Sodium   133 L 


 


Carbon Dioxide   21 L 


 


BUN   39 H 


 


Creatinine   3.7 H 


 


Glucose   


 


Calcium   8.2 L 


 


Ferritin   


 


Troponin T    0.075 H


 


C-Reactive Protein   


 


NT-Pro-B Natriuret Pep    47282 H


 


Total Protein   5.6 L 


 


Albumin   2.8 L 


 


HDL Cholesterol    64 H














  08/09/22 08/09/22 08/09/22





  08:04 08:04 08:04


 


RBC   


 


Hgb   


 


MCV   


 


RDW   


 


Seg Neuts % (Manual)   


 


Lymphocytes % (Manual)   


 


Lymphocytes # (Manual)   


 


D-Dimer   1044.30 H 


 


Sodium   


 


Carbon Dioxide   


 


BUN  48 H  


 


Creatinine  4.0 H  


 


Glucose  147 H  


 


Calcium   


 


Ferritin    458.0 H


 


Troponin T   


 


C-Reactive Protein  10.50 H  


 


NT-Pro-B Natriuret Pep   


 


Total Protein   


 


Albumin   


 


HDL Cholesterol   














- Diagnostic Findings


Chest x-ray: image reviewed





Assessment and Plan





62 y/o with acute respiratory failure secondary to COVID 19





1.  Dexamethasone as ordered


2.  Agree with ID and use of actemra


3. If able to prone, suggest prone as tolerated during the day and sleep prone 

at night


4.  Wean FiO2 and flow for sats >88%


5.  HD per renal, need to keep daily net negative state if possible


6.  Guarded prognosis.

## 2022-08-10 NOTE — ELECTROCARDIOGRAPH REPORT
[facility]

                                       

Test Date:    2022               Test Time:    13:40:47

Pat Name:     FELICITY DIAS             Department:   

Patient ID:   [id number]          Room:         A351

Gender:       F                        Technician:   HAL

:          [id number]               Requested By: AMITA WEBER

Order Number: [id number]             Reading MD:   Lamberto Salazar

                                 Measurements

Intervals                              Axis          

Rate:         98                       P:            43

MD:           163                      QRS:          -26

QRSD:         87                       T:            22

QT:           347                                    

QTc:          435                                    

                           Interpretive Statements

Sinus rhythm

Atrial premature complexes

Inferior infarct, old

Consider anterior infarct

No previous ECG available for comparison

Electronically Signed On 8- 10:51:09 EDT by Lamberto Salazar

## 2022-08-10 NOTE — PROGRESS NOTE
Assessment and Plan


Assessment and plan: 








#COVID-19 pneumonia


#Acute hypoxic respiratory failure-improving


- etiology: Secondary to COVID-19 pneumonia + possible acute heart failure


- baseline oxygen requirements: Room air


- supplemental oxygen: High flow nasal cannula 30 L / 80% FiO2


- Continue protocol: continue pulse oximetry, wean oxygen as tolerated, 

dexamethasone 8 mg daily x10 days, azithromycin 250 mg daily x5 days (completes 

on 8/12/2022), airborne and droplet precautions


-Infectious disease consulted; appreciate recs.  Patient not currently candidate

for remdesivir given renal function.


 Pulmonology consulted; pending recs


- continue to monitor





#Possible acute heart failure


- Continue CHF exacerbation protocol: Telemetry, Strict I/O, monitor urine 

output every shift, daily weights, afterload reduction, low-sodium diet, and 

fluid restriction of approximately 1.5 mL/day, IV Lasix 40 mg twice daily


- Supplemental oxygen: High flow nasal cannula 30 L / 90% FiO2


- ProBNP on admission: 13,672


- Pending TTE to evaluate cardiac function


- Continue to monitor





#Elevated D-dimer


 D-dimer 1044


Unremarkable bilateral venous Dopplers to evaluate for possible DVT.  Continue 

to monitor.





#ESRD on hemodialysis


-Access: Permacath in right upper chest


-Outpatient schedule: Unknown


-HD center: N/A


-Nephrology consulted; appreciate recs. 


-Renally dose medications and avoid nephrotoxic drugs. Renal diet.





#Macrocytic anemia


 Hemoglobin 8.8


 Transfuse if hemoglobin <7 or patient becomes symptomatic.  Continue to 

monitor.





#Hypothyroidism


 Continue home levothyroxine 200 mcg daily





#Mild protein caloric malnutrition


 Albumin 2.5


Continue dietary supplementation








Critical Care Billing:


The high probability of a clinically significant, sudden or life threatening 

deterioration of the [respiratory] system(s) required my full and direct 

attention, intervention and personal management. The aggregate critical care 

time was [60] minutes. This time is in addition to time spent performing 

reported procedures but includes the following: 





[x] Data Review and interpretation 





[x] Patient assessment and monitoring of vital signs 





[x] Documentation 





[x] Medication orders and management


Disposition Plan: Continue medical management


Total Time Spent with Patient (Minutes): 45 minutes





History


Interval history: 





No acute events overnight.





Hospitalist Physical





- Constitutional


Vitals: 


                                        











Temp Pulse Resp BP Pulse Ox


 


 99.0 F   89   20   131/66   99 


 


 08/09/22 23:18  08/10/22 08:00  08/10/22 08:00  08/09/22 23:27  08/10/22 08:00











General appearance: Present: no acute distress, well-nourished





- EENT


Eyes: Present: PERRL, EOM intact


ENT: hearing intact, clear oral mucosa





- Neck


Neck: Present: supple, normal ROM





- Respiratory


Respiratory effort: labored


Respiratory: bilateral: diminished (High flow nasal cannula 30 L / 80% FiO2)





- Cardiovascular


Rhythm: regular


Heart Sounds: Present: S1 & S2





- Extremities


Extremities: no ischemia, pulses intact, pulses symmetrical, No edema, normal 

temperature, normal color


Peripheral Pulses: within normal limits





- Abdominal


General gastrointestinal: soft, non-tender, non-distended, normal bowel sounds





- Integumentary


Integumentary: Present: clear, warm, dry





- Psychiatric


Psychiatric: appropriate mood/affect, cooperative





- Neurologic


Neurologic: CNII-XII intact





- Allied Health


Allied health notes reviewed: nursing





HEART Score





- HEART Score


Troponin: 


                                        











Troponin T  0.075 ng/mL (0.00-0.029)  H  08/08/22  14:49    














Results





- Labs


CBC & Chem 7: 


                                 08/10/22 07:21





                                 08/10/22 07:21


Labs: 


                             Laboratory Last Values











WBC  8.0 K/mm3 (4.5-11.0)   08/10/22  07:21    


 


RBC  2.83 M/mm3 (3.65-5.03)  L  08/10/22  07:21    


 


Hgb  8.8 gm/dl (10.1-14.3)  L  08/10/22  07:21    


 


Hct  27.9 % (30.3-42.9)  L  08/10/22  07:21    


 


MCV  98 fl (79-97)  H  08/10/22  07:21    


 


MCH  31 pg (28-32)   08/10/22  07:21    


 


MCHC  32 % (30-34)   08/10/22  07:21    


 


RDW  17.2 % (13.2-15.2)  H  08/10/22  07:21    


 


Plt Count  182 K/mm3 (140-440)   08/10/22  07:21    


 


Baso % (Auto)  Np   08/08/22  14:49    


 


Add Manual Diff  Complete   08/08/22  14:49    


 


Total Counted  100   08/08/22  14:49    


 


Seg Neutrophils %  Np   08/10/22  07:21    


 


Seg Neuts % (Manual)  78.0 % (40.0-70.0)  H  08/08/22  14:49    


 


Band Neutrophils %  14.0 %  08/08/22  14:49    


 


Lymphocytes % (Manual)  3.0 % (13.4-35.0)  L  08/08/22  14:49    


 


Reactive Lymphs % (Man)  0 %  08/08/22  14:49    


 


Monocytes % (Manual)  5.0 % (0.0-7.3)   08/08/22  14:49    


 


Eosinophils % (Manual)  0 % (0.0-4.3)   08/08/22  14:49    


 


Basophils % (Manual)  0 % (0.0-1.8)   08/08/22  14:49    


 


Metamyelocytes %  0 %  08/08/22  14:49    


 


Myelocytes %  0 %  08/08/22  14:49    


 


Promyelocytes %  0 %  08/08/22  14:49    


 


Blast Cells %  0 %  08/08/22  14:49    


 


Nucleated RBC %  Not Reportable   08/08/22  14:49    


 


Seg Neutrophils # Man  6.6 K/mm3 (1.8-7.7)   08/08/22  14:49    


 


Band Neutrophils #  1.2 K/mm3  08/08/22  14:49    


 


Lymphocytes # (Manual)  0.3 K/mm3 (1.2-5.4)  L  08/08/22  14:49    


 


Abs React Lymphs (Man)  0.0 K/mm3  08/08/22  14:49    


 


Monocytes # (Manual)  0.4 K/mm3 (0.0-0.8)   08/08/22  14:49    


 


Eosinophils # (Manual)  0.0 K/mm3 (0.0-0.4)   08/08/22  14:49    


 


Basophils # (Manual)  0.0 K/mm3 (0.0-0.1)   08/08/22  14:49    


 


Metamyelocytes #  0.0 K/mm3  08/08/22  14:49    


 


Myelocytes #  0.0 K/mm3  08/08/22  14:49    


 


Promyelocytes #  0.0 K/mm3  08/08/22  14:49    


 


Blast Cells #  0.0 K/mm3  08/08/22  14:49    


 


WBC Morphology  Not Reportable   08/08/22  14:49    


 


Hypersegmented Neuts  Not Reportable   08/08/22  14:49    


 


Hyposegmented Neuts  Not Reportable   08/08/22  14:49    


 


Hypogranular Neuts  Not Reportable   08/08/22  14:49    


 


Smudge Cells  Not Reportable   08/08/22  14:49    


 


Toxic Granulation  Not Reportable   08/08/22  14:49    


 


Toxic Vacuolation  Not Reportable   08/08/22  14:49    


 


Dohle Bodies  Not Reportable   08/08/22  14:49    


 


Pelger-Huet Anomaly  Not Reportable   08/08/22  14:49    


 


Komal Rods  Not Reportable   08/08/22  14:49    


 


Platelet Estimate  Consistent w auto   08/08/22  14:49    


 


Clumped Platelets  Not Reportable   08/08/22  14:49    


 


Plt Clumps, EDTA  Not Reportable   08/08/22  14:49    


 


Large Platelets  Not Reportable   08/08/22  14:49    


 


Giant Platelets  Not Reportable   08/08/22  14:49    


 


Platelet Satelliting  Not Reportable   08/08/22  14:49    


 


Plt Morphology Comment  Not Reportable   08/08/22  14:49    


 


RBC Morphology  Not Reportable   08/08/22  14:49    


 


Dimorphic RBCs  Not Reportable   08/08/22  14:49    


 


Polychromasia  Not Reportable   08/08/22  14:49    


 


Hypochromasia  Not Reportable   08/08/22  14:49    


 


Poikilocytosis  Not Reportable   08/08/22  14:49    


 


Anisocytosis  1+   08/08/22  14:49    


 


Microcytosis  Not Reportable   08/08/22  14:49    


 


Macrocytosis  Not Reportable   08/08/22  14:49    


 


Spherocytes  Not Reportable   08/08/22  14:49    


 


Pappenheimer Bodies  Not Reportable   08/08/22  14:49    


 


Sickle Cells  Not Reportable   08/08/22  14:49    


 


Target Cells  Not Reportable   08/08/22  14:49    


 


Tear Drop Cells  Not Reportable   08/08/22  14:49    


 


Ovalocytes  Not Reportable   08/08/22  14:49    


 


Helmet Cells  Not Reportable   08/08/22  14:49    


 


Price-Walland Bodies  Not Reportable   08/08/22  14:49    


 


Cabot Rings  Not Reportable   08/08/22  14:49    


 


Castana Cells  Not Reportable   08/08/22  14:49    


 


Bite Cells  Not Reportable   08/08/22  14:49    


 


Crenated Cell  Not Reportable   08/08/22  14:49    


 


Elliptocytes  Not Reportable   08/08/22  14:49    


 


Acanthocytes (Spur)  Not Reportable   08/08/22  14:49    


 


Rouleaux  Not Reportable   08/08/22  14:49    


 


Hemoglobin C Crystals  Not Reportable   08/08/22  14:49    


 


Schistocytes  Not Reportable   08/08/22  14:49    


 


Malaria parasites  Not Reportable   08/08/22  14:49    


 


Sarabjit Bodies  Not Reportable   08/08/22  14:49    


 


Hem Pathologist Commnt  No   08/08/22  14:49    


 


PT  13.5 Sec. (12.2-14.9)   08/08/22  14:49    


 


INR  0.91  (0.87-1.13)   08/08/22  14:49    


 


APTT  33.8 Sec. (24.2-36.6)   08/08/22  14:49    


 


D-Dimer  1044.30 ng/mlDDU (0-234)  H  08/09/22  08:04    


 


Sodium  141 mmol/L (137-145)   08/10/22  07:21    


 


Potassium  3.8 mmol/L (3.6-5.0)   08/10/22  07:21    


 


Chloride  100.5 mmol/L ()   08/10/22  07:21    


 


Carbon Dioxide  31 mmol/L (22-30)  H  08/10/22  07:21    


 


Anion Gap  13 mmol/L  08/10/22  07:21    


 


BUN  29 mg/dL (7-17)  H  08/10/22  07:21    


 


Creatinine  2.6 mg/dL (0.6-1.2)  H  08/10/22  07:21    


 


Estimated GFR  19 ml/min  08/10/22  07:21    


 


BUN/Creatinine Ratio  11 %  08/10/22  07:21    


 


Glucose  100 mg/dL ()   08/10/22  07:21    


 


Calcium  8.2 mg/dL (8.4-10.2)  L  08/10/22  07:21    


 


Ferritin  458.0 ng/mL (10.0-200.0)  H  08/09/22  08:04    


 


Total Bilirubin  0.20 mg/dL (0.1-1.2)   08/08/22  14:49    


 


AST  26 units/L (5-40)   08/08/22  14:49    


 


ALT  14 units/L (7-56)   08/08/22  14:49    


 


Alkaline Phosphatase  70 units/L ()   08/08/22  14:49    


 


Troponin T  0.075 ng/mL (0.00-0.029)  H  08/08/22  14:49    


 


C-Reactive Protein  10.50 mg/dL (0.00-1.30)  H  08/09/22  08:04    


 


NT-Pro-B Natriuret Pep  93877 pg/mL (0-900)  H  08/08/22  14:49    


 


Total Protein  5.6 g/dL (6.3-8.2)  L  08/08/22  14:49    


 


Albumin  2.8 g/dL (3.9-5)  L  08/08/22  14:49    


 


Albumin/Globulin Ratio  1.0 %  08/08/22  14:49    


 


Triglycerides  128 mg/dL (2-149)   08/08/22  14:49    


 


Cholesterol  154 mg/dL ()   08/08/22  14:49    


 


LDL Cholesterol Direct  63 mg/dL ()   08/08/22  14:49    


 


HDL Cholesterol  64 mg/dL (40-59)  H  08/08/22  14:49    


 


Cholesterol/HDL Ratio  2.40 %  08/08/22  14:49    


 


Hepatitis A IgM Ab  Non-reactive  (NonReactive)   08/09/22  10:27    


 


Hep Bs Antigen  Non-reactive  (Negative)   08/09/22  10:27    


 


Hep B Core IgM Ab  Non-reactive  (NonReactive)   08/09/22  10:27    


 


Hepatitis C Antibody  Non-reactive  (NonReactive)   08/09/22  10:27    











Microbiology: 


Microbiology





08/08/22 16:49   Peripheral/Venous   Blood Culture - Preliminary


                            NO GROWTH AFTER 24 HOURS


08/08/22 16:49   Peripheral/Venous   Blood Culture - Preliminary


                            NO GROWTH AFTER 24 HOURS








Chang/IV: 


                                        





Voiding Method                   External Female Catheter











Active Medications





- Current Medications


Current Medications: 














Generic Name Dose Route Start Last Admin





  Trade Name Freq  PRN Reason Stop Dose Admin


 


Acetaminophen  650 mg  08/08/22 23:21  08/09/22 23:29





  Acetaminophen 325 Mg Tab  PO   650 mg





  Q4H PRN   Administration





  Pain MILD(1-3)/Fever >100.5/HA  


 


Albuterol  2.5 mg  08/08/22 23:21 





  Albuterol 2.5 Mg/3 Ml Nebu  IH  





  Q3HRT PRN  





  Shortness Of Breath  


 


Albuterol/Ipratropium  1 ampul  08/09/22 20:00  08/10/22 08:51





  Ipratropium/Albuterol Sulfate 3 Ml Ampul.Neb  IH   1 ampul





  TIDRT BERHANE   Administration


 


Ascorbic Acid  500 mg  08/09/22 10:00  08/10/22 10:04





  Ascorbic Acid 500 Mg Tab  PO   500 mg





  DAILY BERHANE   Administration


 


Azithromycin  500 mg  08/11/22 10:00 





  Azithromycin 250 Mg Tab  PO  08/13/22 10:01 





  QDAY [facility]  





  Protocol  


 


Carvedilol  12.5 mg  08/09/22 10:00  08/10/22 10:04





  Carvedilol 12.5 Mg Tab  PO   12.5 mg





  BID BERHANE   Administration


 


Dexamethasone  8 mg  08/09/22 10:00  08/10/22 10:04





  Dexamethasone 4 Mg/Ml Vial  IV  08/18/22 10:01  8 mg





  DAILY BERHANE   Administration


 


Famotidine  20 mg  08/09/22 10:00  08/10/22 10:04





  Famotidine 20 Mg Tab  PO   20 mg





  DAILY BERHANE   Administration


 


Ferrous Sulfate  325 mg  08/09/22 10:00  08/10/22 10:04





  Ferrous Sulfate 325 Mg Tab  PO   325 mg





  DAILY BERHANE   Administration


 


Furosemide  40 mg  08/09/22 07:15  08/10/22 06:30





  Furosemide 40 Mg/4 Ml Inj  IV   40 mg





  0600,1800 BERHANE   Administration


 


Guaifenesin  200 mg  08/09/22 18:23  08/10/22 06:28





  Guaifenesin 100 Mg/5 Ml Oral Liqd  PO   200 mg





  Q4H PRN   Administration





  Cough  


 


Heparin Sodium (Porcine)  5,000 unit  08/09/22 06:00  08/10/22 06:31





  Heparin 5,000 Unit/1 Ml Vial  SUB-Q   5,000 unit





  Q8HR BERHANE   Administration


 


Sodium Chloride  100 mls @ 999 mls/hr  08/09/22 10:30 





  Nacl 0.9%  IV  





  NASH PRN  





  Hypotension  


 


TOCILIZUMAB 600 mg/ Sodium  130 mls @ 120 mls/hr  08/10/22 12:00 





  Chloride  IV  08/10/22 13:04 





  ONCE ONE  


 


Levothyroxine Sodium  200 mcg  08/09/22 06:00  08/10/22 06:28





  Levothyroxine 100 Mcg Tab  PO   200 mcg





  QAM@0600 BERHANE   Administration


 


Morphine Sulfate  2 mg  08/08/22 23:21 





  Morphine 2 Mg/1 Ml Inj  IV  





  Q4H PRN  





  Pain, Moderate (4-6)  


 


Morphine Sulfate  4 mg  08/08/22 23:21 





  Morphine 4 Mg/1 Ml Inj  IV  





  Q4H PRN  





  Pain , Severe (7-10)  


 


Ondansetron HCl  4 mg  08/08/22 23:21 





  Ondansetron 4 Mg/2 Ml Inj  IV  





  Q8H PRN  





  Nausea And Vomiting  


 


Sevelamer Carbonate  800 mg  08/09/22 08:00  08/10/22 10:04





  Sevelamer Carbonate 800 Mg Tab  PO   800 mg





  TIDWM BERHANE   Administration


 


Sodium Chloride  10 ml  08/09/22 10:00  08/10/22 10:04





  Sodium Chloride 0.9% 10 Ml Flush Syringe  IV   10 ml





  BID BERHANE   Administration


 


Sodium Chloride  10 ml  08/08/22 23:21 





  Sodium Chloride 0.9% 10 Ml Flush Syringe  IV  





  PRN PRN  





  LINE FLUSH  














Nutrition/Malnutrition Assess





- Dietary Evaluation


Nutrition/Malnutrition Findings: 


                                        





Nutrition Notes                                            Start:  08/09/22 

12:04


Freq:                                                      Status: Active       

 


Protocol:                                                                       

 


 Document     08/09/22 12:04  [place]  (Rec: 08/09/22 12:32  [place]  [id number])


 Nutrition Notes


     Need for Assessment generated from:         MD Order


     Initial or Follow up                        Assessment


     Current Diagnosis                           CKD (stage V CKD),Hypertension


                                                 ,Respiratory Failure


     Other Pertinent Diagnosis                   ESRD+HD, COVID-19/Pneumonia,


                                                 CHF, Hypothyroidism.


     Current Diet                                Cardiac Diet (since B 08/08),


                                                 Cardiac -Renal- Diet+D Suppl (


                                                 from D 08/09).


     Labs/Tests                                  08/09: BUN 48, Crea 4.0, Glu


                                                 147.


     Pertinent Medications                       08/09: Vit C, FeSO4,


                                                 Levothyroxine, Renvela, others


                                                 nutritionally unremarkable.


     Height                                      5 ft 3 in


     Weight                                      72.575 kg


     Ideal Body Weight (kg)                      52.27


     BMI                                         28.3


     Intake Prior to Admission                   Good


     Weight change and time frame                Pt denies having loss body


                                                 weight PTA.


     Weight Status                               Overweight


     Subjective/Other Information                RD consult for dietary


                                                 supplementation assessment.


                                                 No reports available on Pt's


                                                 PO intake of meals at the time


                                                 , will assess at F/U.


                                                 I will prescribe dietary


                                                 supplements to compensate for


                                                 possible poor or insufficient


                                                 PO intake of meals during LOS.


                                                 I will recommend renal


                                                 restriction to current diet,


                                                 to support Pt's ESRD condition


                                                 during LOS.


                                                 Pt is on High Flow Nasal


                                                 Cannula, O2 saturation @ 91%,


                                                 according to Physical


                                                 Assessment History notes.


                                                 Pt has missing teeth,


                                                 according to Physical


                                                 Assessment History notes.


                                                 Pt is a SNF resident,


                                                 according to Progress notes.


     Percent of energy/protein needs met:        Prescribed Cardiac -Renal-


                                                 Diet provides for energy/


                                                 protein needs (2,230 Kcal/85 g


                                                 ) during LOS; additionally,


                                                 Dietary Supplements will


                                                 compensate for possible poor


                                                 or insufficient PO intake of


                                                 meals with 850 Kcal and 38 g


                                                 of protein.


     Burn                                        Absent


     Trauma                                      Absent


     GI Symptoms                                 None


     Food Allergy                                No


     Skin Integrity/Comment                      Assessment WNL.


     Minimum of two criteria                     No


     Fluid Accumulation                          N/A


     Reduced  Strength                       N/A (non-severe)


     Protein-Calorie Malnutrition                N\A


     #2


      Nutrition Diagnosis                        Altered nutrition-related


                                                 laboratory values


      Etiology                                   CKD V, ESRD.


      As Evidenced by Signs and Symptoms         08/09: BUN 48, Crea 4.0, Glu


                                                 147.


     #1


      Nutrition Diagnosis                        Predicted suboptimal energy


                                                 intake


      Etiology                                   Ongoing and concomitant


                                                 chronic metabolic conditions.


      As Evidenced by Signs and Symptoms         No reports available on Pt's


                                                 PO intake of meals at the time


                                                 , MD request for dietary


                                                 supplements.


     Is patient on ventilator?                   No


     Is Patient Ambulatory and/or Out of Bed     No


     REE-([facility]-confined to bed)      1528.644


     Kcal/Kg value to use for calculation        19


     Approximate Energy Requirements Using       1379


      kcal/Kg                                    


     Calculation Used for Recommendations        Kcal/kg


     Additional Notes                            Protein: >1.2 g/Kg ABW; >88 g/


                                                 day.


                                                 Fluids: 1 ml/Kcal, or as per


                                                 MD.


 Nutrition Intervention


     Change Diet Order:                          Add Renal restriction to


                                                 Cardiac Diet, and continue as


                                                 tolerated.


     Add Supplement/Snack (indicate name/kcal    Start 8 fl oz Nepro w/


      /protein )                                 CARBSTEADY; BID


     Provides kCal:                              850


     Provides Protein (gm)                       38


     Goal #1                                     Compensate, through dietary


                                                 supplementation, for possible


                                                 poor or insufficient PO intake


                                                 of meals during LOS.


     Goal #2                                     Help reach and maintain


                                                 acceptable chemistry lab


                                                 values during LOS.


     Goal #3                                     Adjust the dietary


                                                 intervention to better serve


                                                 Pt's needs and clinical


                                                 conditions during LOS.


     Follow-Up By:                               08/16/22


     Additional Comments                         Continue monitoring food


                                                 tolerance, %PO intake of meals


                                                 , dietary supplements, and BM.

## 2022-08-10 NOTE — PROGRESS NOTE
Assessment and Plan





Cultures:


SARS CoV2 PCR: Pending here. Positive at nursing home. 


8/8/2022 blood culture: no growth 





A/P:


61-year-old female with ESRD on HD, hypertension, hypothyroidism, nursing home 

resident was brought into the emergency room due to shortness of breath:





#Bilateral pneumonia: Secondary to COVID-19





#Acute hypoxic respiratory failure: Requiring HFNC.





#ESRD on HD: Renally adjust antibiotics





#CHF





Recs:


-continue IV/PO Dexamethasone x 10 days


-Continue empiric azithromycin for 5 days


-Not a candidate for remdesivir due to renal failure


-since she is requiring HFNC >30 L/min, and CRP >7.5, would be a candidate for 

Actemra 8 mg/kg x 1, d/w patient and with pharmacy. Order placed


-prophylactic anticoagulation based on d-dimer per hospital protocol


-trend ferritin, d-dimer, CRP every 2-3 days 





Sneha Thompson MD, FACP, TL [facility] ([facility])


O: [phone]


F: [phone]


C: [phone]





Subjective


Date of service: 08/10/22


Interval history: 





No fever.  Breathing slightly better but remains on HFNC.





Objective





- Exam


Narrative Exam: 





Physical Exam: 


Constitutional: Alert, cooperative. Mild distress, on HFNC. 


Head, Ears, Nose: Normocephalic, atraumatic. External ears, nose normal


Eyes: Conjunctivae/corneas clear. No icterus. No ptosis.


Neck: Supple, no meningeal signs


Cardiovascular: S1, S2 +


Respiratory: AE fair bilaterally


GI: Soft, non-tender; bowel sounds normal. No peritoneal signs


Musculoskeletal: No pedal edema, no cyanosis. PermCath +


Skin: No rash or abscess


Hem/Lymphatic: No palpable cervical or supraclavicular nodes. No lymphangitis


Psych: Mood ok. Affect normal


Neurological: Awake, alert, oriented. 





- Constitutional


Vitals: 


                                   Vital Signs











Temp Pulse Resp BP Pulse Ox


 


 99.0 F   89   20   131/66   99 


 


 08/09/22 23:18  08/10/22 08:00  08/10/22 08:00  08/09/22 23:27  08/10/22 08:00








                           Temperature -Last 24 Hours











Temperature                    99.0 F


 


Temperature                    98.6 F


 


Temperature                    98.9 F


 


Temperature                    97.9 F


 


Temperature                    98.9 F

















- Labs


CBC & Chem 7: 


                                 08/10/22 07:21





                                 08/10/22 07:21


Labs: 


                              Abnormal lab results











  08/10/22 08/10/22 Range/Units





  07:21 07:21 


 


RBC  2.83 L   (3.65-5.03)  M/mm3


 


Hgb  8.8 L   (10.1-14.3)  gm/dl


 


Hct  27.9 L   (30.3-42.9)  %


 


MCV  98 H   (79-97)  fl


 


RDW  17.2 H   (13.2-15.2)  %


 


Carbon Dioxide   31 H  (22-30)  mmol/L


 


BUN   29 H  (7-17)  mg/dL


 


Creatinine   2.6 H  (0.6-1.2)  mg/dL


 


Calcium   8.2 L  (8.4-10.2)  mg/dL

## 2022-08-11 LAB
BASOPHILS # (AUTO): 0 K/MM3 (ref 0–0.1)
BASOPHILS NFR BLD AUTO: 0.1 % (ref 0–1.8)
BUN SERPL-MCNC: 50 MG/DL (ref 7–17)
BUN/CREAT SERPL: 15 %
CALCIUM SERPL-MCNC: 8.2 MG/DL (ref 8.4–10.2)
CRP SERPL-MCNC: 2.1 MG/DL (ref 0–1.3)
EOSINOPHIL # BLD AUTO: 0 K/MM3 (ref 0–0.4)
EOSINOPHIL NFR BLD AUTO: 0 % (ref 0–4.3)
HCT VFR BLD CALC: 25.9 % (ref 30.3–42.9)
HEMOLYSIS INDEX: 2
HGB BLD-MCNC: 8.2 GM/DL (ref 10.1–14.3)
LYMPHOCYTES # BLD AUTO: 0.3 K/MM3 (ref 1.2–5.4)
LYMPHOCYTES NFR BLD AUTO: 5.9 % (ref 13.4–35)
MCHC RBC AUTO-ENTMCNC: 32 % (ref 30–34)
MCV RBC AUTO: 98 FL (ref 79–97)
MONOCYTES # (AUTO): 0.2 K/MM3 (ref 0–0.8)
MONOCYTES % (AUTO): 4.1 % (ref 0–7.3)
PLATELET # BLD: 172 K/MM3 (ref 140–440)
RBC # BLD AUTO: 2.65 M/MM3 (ref 3.65–5.03)

## 2022-08-11 PROCEDURE — 5A1D70Z PERFORMANCE OF URINARY FILTRATION, INTERMITTENT, LESS THAN 6 HOURS PER DAY: ICD-10-PCS | Performed by: INTERNAL MEDICINE

## 2022-08-11 RX ADMIN — SEVELAMER CARBONATE SCH MG: 800 TABLET, FILM COATED ORAL at 17:32

## 2022-08-11 RX ADMIN — OXYCODONE HYDROCHLORIDE AND ACETAMINOPHEN SCH MG: 500 TABLET ORAL at 10:20

## 2022-08-11 RX ADMIN — HEPARIN SODIUM SCH UNIT: 5000 INJECTION, SOLUTION INTRAVENOUS; SUBCUTANEOUS at 16:12

## 2022-08-11 RX ADMIN — ACETAMINOPHEN PRN MG: 325 TABLET ORAL at 20:02

## 2022-08-11 RX ADMIN — LEVOTHYROXINE SODIUM SCH MCG: 0.1 TABLET ORAL at 05:07

## 2022-08-11 RX ADMIN — GUAIFENESIN PRN MG: 100 SOLUTION ORAL at 10:20

## 2022-08-11 RX ADMIN — IPRATROPIUM BROMIDE AND ALBUTEROL SULFATE SCH: .5; 3 SOLUTION RESPIRATORY (INHALATION) at 10:00

## 2022-08-11 RX ADMIN — FUROSEMIDE SCH MG: 10 INJECTION, SOLUTION INTRAVENOUS at 17:33

## 2022-08-11 RX ADMIN — HEPARIN SODIUM SCH UNIT: 5000 INJECTION, SOLUTION INTRAVENOUS; SUBCUTANEOUS at 22:26

## 2022-08-11 RX ADMIN — SEVELAMER CARBONATE SCH MG: 800 TABLET, FILM COATED ORAL at 10:12

## 2022-08-11 RX ADMIN — SEVELAMER CARBONATE SCH MG: 800 TABLET, FILM COATED ORAL at 12:35

## 2022-08-11 RX ADMIN — HEPARIN SODIUM SCH UNIT: 5000 INJECTION, SOLUTION INTRAVENOUS; SUBCUTANEOUS at 05:07

## 2022-08-11 RX ADMIN — FAMOTIDINE SCH MG: 20 TABLET ORAL at 10:13

## 2022-08-11 RX ADMIN — FUROSEMIDE SCH MG: 10 INJECTION, SOLUTION INTRAVENOUS at 05:07

## 2022-08-11 RX ADMIN — Medication SCH ML: at 10:13

## 2022-08-11 RX ADMIN — Medication SCH ML: at 22:27

## 2022-08-11 RX ADMIN — EPOETIN ALFA-EPBX PRN UNIT: 20000 INJECTION, SOLUTION INTRAVENOUS; SUBCUTANEOUS at 17:20

## 2022-08-11 RX ADMIN — FERROUS SULFATE TAB 325 MG (65 MG ELEMENTAL FE) SCH MG: 325 (65 FE) TAB at 10:13

## 2022-08-11 NOTE — PROGRESS NOTE
Assessment and Plan





This is a 61 year old woman who presents with dyspnea, COVID-19





# ESRD: continue HD Tu/Th/Sa or prn, due today


- daily labs


- renally dose meds


- avoid nephrotoxins


- renal diet


- verbal consent obtained for HD





# Anemia: last hemoglobin 9.6->8.8->8.2, restart ESAs with HD TIW  





# HTN: UF as tolerated.  BP stable





# Secondary Hyperparathyroidism: continue home binders as needed, vitamin D 

analogs prn





# COVID-19 Pneumonia, Respiratory Failure: on HFNC 70% Fio2, pulmonary/ID also 

following





# CHF: note BNP 13K on admission, UF as tolerated





Subjective


Date of service: 08/11/22


Interval history: 





Chart, labs, vitals reviewed.  Remains on HFNC





Objective





- Exam


Narrative Exam: 





No direct examination today due to COVID-19, need to limit PPE





- Vital Signs


Vital signs: 


                               Vital Signs - 12hr











  08/11/22 08/11/22 08/11/22





  10:10 10:13 10:45


 


Temperature 98.6 F  


 


Pulse Rate 79 79 


 


Respiratory   





Rate   


 


Blood Pressure 132/63 132/63 


 


O2 Sat by Pulse 97  98





Oximetry   














  08/11/22





  11:50


 


Temperature 


 


Pulse Rate 


 


Respiratory 21





Rate 


 


Blood Pressure 


 


O2 Sat by Pulse 100





Oximetry 














- Lab





                                 08/11/22 06:22





                                 08/11/22 06:22


                             Most recent lab results











Calcium  8.2 mg/dL (8.4-10.2)  L  08/11/22  06:22    














Medications & Allergies





- Medications


Allergies/Adverse Reactions: 


                                    Allergies





NSAIDS (Non-Steroidal Anti-Inflamma Allergy (Verified 08/08/22 13:30)


   Rash


Sulfa (Sulfonamide Antibiotics) Allergy (Verified 08/08/22 13:30)


   Rash








Home Medications: 


                                Home Medications











 Medication  Instructions  Recorded  Confirmed  Last Taken  Type


 


Ascorbic Acid [Vitamin C] 500 mg PO DAILY 08/08/22 08/08/22 Unknown History


 


Ferrous Sulfate [Ferrous Sulfate 324 mg PO DAILY 08/08/22 08/08/22 Unknown 

History





324 MG]     


 


Levothyroxine Sodium 200 mcg PO DAILY 08/08/22 08/08/22 Unknown History





[Levothyroxine]     


 


Venlafaxine [Effexor 37.5mg tab] 37.5 mg PO QDAY 08/08/22 08/08/22 Unknown 

History


 


carvediloL [Coreg] 12.5 mg PO BID 08/08/22 08/08/22 Unknown History


 


sevelamer HCL [Sevelamer HCl] 800 mg PO TID 08/08/22 08/08/22 Unknown History











Active Medications: 














Generic Name Dose Route Start Last Admin





  Trade Name Freq  PRN Reason Stop Dose Admin


 


Acetaminophen  650 mg  08/08/22 23:21  08/09/22 23:29





  Acetaminophen 325 Mg Tab  PO   650 mg





  Q4H PRN   Administration





  Pain MILD(1-3)/Fever >100.5/HA  


 


Albuterol  2.5 mg  08/08/22 23:21 





  Albuterol 2.5 Mg/3 Ml Nebu  IH  





  Q3HRT PRN  





  Shortness Of Breath  


 


Ascorbic Acid  500 mg  08/09/22 10:00  08/11/22 10:20





  Ascorbic Acid 500 Mg Tab  PO   500 mg





  DAILY BERHANE   Administration


 


Azithromycin  500 mg  08/11/22 10:00  08/11/22 10:13





  Azithromycin 250 Mg Tab  PO  08/13/22 10:01  500 mg





  QDAY BERHANE   Administration





  Protocol  


 


Carvedilol  12.5 mg  08/09/22 10:00  08/11/22 10:13





  Carvedilol 12.5 Mg Tab  PO   12.5 mg





  BID BERHANE   Administration


 


Dexamethasone  8 mg  08/09/22 10:00  08/11/22 10:13





  Dexamethasone 4 Mg/Ml Vial  IV  08/18/22 10:01  8 mg





  DAILY BERHANE   Administration


 


Famotidine  20 mg  08/09/22 10:00  08/11/22 10:13





  Famotidine 20 Mg Tab  PO   20 mg





  DAILY BERHANE   Administration


 


Ferrous Sulfate  325 mg  08/09/22 10:00  08/11/22 10:13





  Ferrous Sulfate 325 Mg Tab  PO   325 mg





  DAILY BERHANE   Administration


 


Furosemide  40 mg  08/09/22 07:15  08/11/22 05:07





  Furosemide 40 Mg/4 Ml Inj  IV   40 mg





  0600,1800 BERHANE   Administration


 


Guaifenesin  200 mg  08/09/22 18:23  08/11/22 10:20





  Guaifenesin 100 Mg/5 Ml Oral Liqd  PO   200 mg





  Q4H PRN   Administration





  Cough  


 


Heparin Sodium (Porcine)  5,000 unit  08/09/22 06:00  08/11/22 05:07





  Heparin 5,000 Unit/1 Ml Vial  SUB-Q   5,000 unit





  Q8HR BERHANE   Administration


 


Sodium Chloride  100 mls @ 999 mls/hr  08/09/22 10:30 





  Nacl 0.9%  IV  





  NASH PRN  





  Hypotension  


 


Levothyroxine Sodium  200 mcg  08/09/22 06:00  08/11/22 05:07





  Levothyroxine 100 Mcg Tab  PO   200 mcg





  QAM@0600 BERHANE   Administration


 


Morphine Sulfate  2 mg  08/08/22 23:21 





  Morphine 2 Mg/1 Ml Inj  IV  





  Q4H PRN  





  Pain, Moderate (4-6)  


 


Morphine Sulfate  4 mg  08/08/22 23:21 





  Morphine 4 Mg/1 Ml Inj  IV  





  Q4H PRN  





  Pain , Severe (7-10)  


 


Ondansetron HCl  4 mg  08/08/22 23:21 





  Ondansetron 4 Mg/2 Ml Inj  IV  





  Q8H PRN  





  Nausea And Vomiting  


 


Sevelamer Carbonate  800 mg  08/09/22 08:00  08/11/22 12:35





  Sevelamer Carbonate 800 Mg Tab  PO   800 mg





  TIDWM BERHANE   Administration


 


Sodium Chloride  10 ml  08/09/22 10:00  08/11/22 10:13





  Sodium Chloride 0.9% 10 Ml Flush Syringe  IV   10 ml





  BID BERHANE   Administration


 


Sodium Chloride  10 ml  08/08/22 23:21 





  Sodium Chloride 0.9% 10 Ml Flush Syringe  IV  





  PRN PRN  





  LINE FLUSH

## 2022-08-11 NOTE — PROGRESS NOTE
Assessment and Plan





Cultures:


SARS CoV2 PCR: Positive 


8/8/2022 blood culture: no growth 





A/P:


61-year-old female with ESRD on HD, hypertension, hypothyroidism, nursing home 

resident was brought into the emergency room due to shortness of breath:





#Bilateral pneumonia: Secondary to COVID-19. High CRP, requiring HFNC. Not a 

candidate for remdesivir. Got Actemra. 





#Acute hypoxic respiratory failure: Requiring HFNC.





#ESRD on HD: Renally adjust antibiotics





#CHF





Recs:


-continue IV/PO Dexamethasone x 10 days


-Continue empiric azithromycin for 5 days


-Not a candidate for remdesivir due to renal failure


-s/p Actemra 8 mg/kg x 1 on 8/10/2022


-prophylactic anticoagulation based on d-dimer per hospital protocol


-CRP improving





Sneha Thompson MD, FACP, [facility] ([facility]


O: [phone]


F: [phone]


C: [phone]





Subjective


Date of service: 08/11/22


Interval history: 





No fever.  Oxygen being weaned. She states she is coughing more. 





Objective





- Exam


Narrative Exam: 





Physical Exam: 


Constitutional: Alert, cooperative. Mild distress, on HFNC. 


Head, Ears, Nose: Normocephalic, atraumatic. External ears, nose normal


Eyes: Conjunctivae/corneas clear. No icterus. No ptosis.


Neck: Supple, no meningeal signs


Cardiovascular: S1, S2 +


Respiratory: AE fair bilaterally


GI: Soft, non-tender; bowel sounds normal. No peritoneal signs


Musculoskeletal: No pedal edema, no cyanosis. PermCath +


Skin: No rash or abscess


Hem/Lymphatic: No palpable cervical or supraclavicular nodes. No lymphangitis


Psych: Mood ok. Affect normal


Neurological: Awake, alert, oriented.  





- Constitutional


Vitals: 


                                   Vital Signs











Temp Pulse Resp BP Pulse Ox


 


 98.8 F   79   21   132/63   98 


 


 08/10/22 23:34  08/11/22 10:13  08/11/22 00:24  08/11/22 10:13  08/11/22 10:45








                           Temperature -Last 24 Hours











Temperature                    98.8 F


 


Temperature                    99.0 F


 


Temperature                    99.0 F

















- Labs


CBC & Chem 7: 


                                 08/11/22 06:22





                                 08/11/22 06:22


Labs: 


                              Abnormal lab results











  08/10/22 08/10/22 08/11/22 Range/Units





  07:21 10:00 06:22 


 


RBC    2.65 L  (3.65-5.03)  M/mm3


 


Hgb    8.2 L  (10.1-14.3)  gm/dl


 


Hct    25.9 L  (30.3-42.9)  %


 


MCV    98 H  (79-97)  fl


 


RDW    16.7 H  (13.2-15.2)  %


 


Lymph % (Auto)    5.9 L  (13.4-35.0)  %


 


Lymph # (Auto)    0.3 L  (1.2-5.4)  K/mm3


 


Seg Neutrophils %    89.9 H  (40.0-70.0)  %


 


Seg Neuts % (Manual)  93.0 H    (40.0-70.0)  %


 


Lymphocytes % (Manual)  6.0 L    (13.4-35.0)  %


 


Lymphocytes # (Manual)  0.5 L    (1.2-5.4)  K/mm3


 


D-Dimer     (0-234)  ng/mlDDU


 


BUN     (7-17)  mg/dL


 


Creatinine     (0.6-1.2)  mg/dL


 


Calcium     (8.4-10.2)  mg/dL


 


Ferritin     (10.0-200.0)  ng/mL


 


Lactate Dehydrogenase     ()  units/L


 


C-Reactive Protein     (0.00-1.30)  mg/dL


 


Coronavirus (PCR)   Positive A   (Negative)  














  08/11/22 08/11/22 08/11/22 Range/Units





  06:22 06:22 06:22 


 


RBC     (3.65-5.03)  M/mm3


 


Hgb     (10.1-14.3)  gm/dl


 


Hct     (30.3-42.9)  %


 


MCV     (79-97)  fl


 


RDW     (13.2-15.2)  %


 


Lymph % (Auto)     (13.4-35.0)  %


 


Lymph # (Auto)     (1.2-5.4)  K/mm3


 


Seg Neutrophils %     (40.0-70.0)  %


 


Seg Neuts % (Manual)     (40.0-70.0)  %


 


Lymphocytes % (Manual)     (13.4-35.0)  %


 


Lymphocytes # (Manual)     (1.2-5.4)  K/mm3


 


D-Dimer  1200.26 H    (0-234)  ng/mlDDU


 


BUN   50 H   (7-17)  mg/dL


 


Creatinine   3.4 H   (0.6-1.2)  mg/dL


 


Calcium   8.2 L   (8.4-10.2)  mg/dL


 


Ferritin    354.3 H  (10.0-200.0)  ng/mL


 


Lactate Dehydrogenase   211 H   ()  units/L


 


C-Reactive Protein   2.10 H   (0.00-1.30)  mg/dL


 


Coronavirus (PCR)     (Negative)

## 2022-08-11 NOTE — PROGRESS NOTE
Assessment and Plan


Assessment and plan: 








#COVID-19 pneumonia


#Acute hypoxic respiratory failure-improving


- etiology: Secondary to COVID-19 pneumonia + possible acute heart failure


- baseline oxygen requirements: Room air


- supplemental oxygen: High flow nasal cannula 10 L / 70% FiO2


- Continue protocol: continue pulse oximetry, wean oxygen as tolerated, 

dexamethasone 8 mg daily x10 days, azithromycin 250 mg daily x5 days (completes 

on 8/12/2022), airborne and droplet precautions


-Infectious disease consulted; appreciate recs.  Patient not currently candidate

for remdesivir given renal function.


 Pulmonology consulted; pending recs


- continue to monitor





#Possible acute heart failure


- Continue CHF exacerbation protocol: Telemetry, Strict I/O, monitor urine 

output every shift, daily weights, afterload reduction, low-sodium diet, and 

fluid restriction of approximately 1.5 mL/day, IV Lasix 40 mg twice daily


- Supplemental oxygen: High flow nasal cannula 10 L / 70% FiO2


- ProBNP on admission: 13,672


- TTE (8/8/2022) revealing EF 55-60%, normal-sized LV, normal LV systolic 

function, mild concentric LVH with mild diastolic dysfunction, and RVSP is 41 

mmHg.


- Continue to monitor





#Elevated D-dimer


 D-dimer 1044


 Unremarkable bilateral venous Dopplers to evaluate for possible DVT.  Continue

to monitor.





#ESRD on hemodialysis


-Access: Permacath in right upper chest


-Outpatient schedule: Unknown


-HD center: N/A


-Nephrology consulted; appreciate recs. 


-Renally dose medications and avoid nephrotoxic drugs. Renal diet.





#Macrocytic anemia


 Hemoglobin 8.8


 Transfuse if hemoglobin <7 or patient becomes symptomatic.  Continue to 

monitor.





#Hypothyroidism


 Continue home levothyroxine 200 mcg daily





#Mild protein caloric malnutrition


 Albumin 2.5


Continue dietary supplementation








Critical Care Billing:


The high probability of a clinically significant, sudden or life threatening 

deterioration of the [respiratory] system(s) required my full and direct 

attention, intervention and personal management. The aggregate critical care 

time was [60] minutes. This time is in addition to time spent performing 

reported procedures but includes the following: 





[x] Data Review and interpretation 





[x] Patient assessment and monitoring of vital signs 





[x] Documentation 





[x] Medication orders and management


Disposition Plan: Continue medical management


Total Time Spent with Patient (Minutes): 45 mins





History


Interval history: 





No acute events overnight. 





Hospitalist Physical





- Constitutional


Vitals: 


                                        











Temp Pulse Resp BP Pulse Ox


 


 98.6 F   79   21   132/63   98 


 


 08/11/22 10:10  08/11/22 10:13  08/11/22 00:24  08/11/22 10:13  08/11/22 10:45











General appearance: Present: no acute distress, well-nourished





- EENT


Eyes: Present: PERRL, EOM intact


ENT: hearing intact, clear oral mucosa, dentition normal





- Neck


Neck: Present: supple, normal ROM





- Respiratory


Respiratory effort: normal


Respiratory: bilateral: diminished (on HFNC 10L/70% FiO2)





- Cardiovascular


Rhythm: regular


Heart Sounds: Present: S1 & S2





- Extremities


Extremities: no ischemia, pulses intact, pulses symmetrical, No edema, normal 

temperature, normal color


Peripheral Pulses: within normal limits





- Abdominal


General gastrointestinal: soft, non-tender, non-distended, normal bowel sounds





- Integumentary


Integumentary: Present: clear, warm, dry





- Psychiatric


Psychiatric: appropriate mood/affect, memory intact, cooperative





- Neurologic


Neurologic: CNII-XII intact





- Allied Health


Allied health notes reviewed: nursing, case management





HEART Score





- HEART Score


Troponin: 


                                        











Troponin T  0.075 ng/mL (0.00-0.029)  H  08/08/22  14:49    














Results





- Labs


CBC & Chem 7: 


                                 08/11/22 06:22





                                 08/11/22 06:22


Labs: 


                             Laboratory Last Values











WBC  5.3 K/mm3 (4.5-11.0)   08/11/22  06:22    


 


RBC  2.65 M/mm3 (3.65-5.03)  L  08/11/22  06:22    


 


Hgb  8.2 gm/dl (10.1-14.3)  L  08/11/22  06:22    


 


Hct  25.9 % (30.3-42.9)  L  08/11/22  06:22    


 


MCV  98 fl (79-97)  H  08/11/22  06:22    


 


MCH  31 pg (28-32)   08/11/22  06:22    


 


MCHC  32 % (30-34)   08/11/22  06:22    


 


RDW  16.7 % (13.2-15.2)  H  08/11/22  06:22    


 


Plt Count  172 K/mm3 (140-440)   08/11/22  06:22    


 


Lymph % (Auto)  5.9 % (13.4-35.0)  L  08/11/22  06:22    


 


Mono % (Auto)  4.1 % (0.0-7.3)   08/11/22  06:22    


 


Eos % (Auto)  0.0 % (0.0-4.3)   08/11/22  06:22    


 


Baso % (Auto)  0.1 % (0.0-1.8)   08/11/22  06:22    


 


Lymph # (Auto)  0.3 K/mm3 (1.2-5.4)  L  08/11/22  06:22    


 


Mono # (Auto)  0.2 K/mm3 (0.0-0.8)   08/11/22  06:22    


 


Eos # (Auto)  0.0 K/mm3 (0.0-0.4)   08/11/22  06:22    


 


Baso # (Auto)  0.0 K/mm3 (0.0-0.1)   08/11/22  06:22    


 


Add Manual Diff  Complete   08/10/22  07:21    


 


Total Counted  100   08/10/22  07:21    


 


Seg Neutrophils %  89.9 % (40.0-70.0)  H  08/11/22  06:22    


 


Seg Neuts % (Manual)  93.0 % (40.0-70.0)  H  08/10/22  07:21    


 


Band Neutrophils %  0 %  08/10/22  07:21    


 


Lymphocytes % (Manual)  6.0 % (13.4-35.0)  L  08/10/22  07:21    


 


Reactive Lymphs % (Man)  0 %  08/10/22  07:21    


 


Monocytes % (Manual)  0 % (0.0-7.3)   08/10/22  07:21    


 


Eosinophils % (Manual)  0 % (0.0-4.3)   08/10/22  07:21    


 


Basophils % (Manual)  0 % (0.0-1.8)   08/10/22  07:21    


 


Metamyelocytes %  0 %  08/10/22  07:21    


 


Myelocytes %  1.0 %  08/10/22  07:21    


 


Promyelocytes %  0 %  08/10/22  07:21    


 


Blast Cells %  0 %  08/10/22  07:21    


 


Nucleated RBC %  Not Reportable   08/10/22  07:21    


 


Seg Neutrophils #  4.8 K/mm3 (1.8-7.7)   08/11/22  06:22    


 


Seg Neutrophils # Man  7.4 K/mm3 (1.8-7.7)   08/10/22  07:21    


 


Band Neutrophils #  0.0 K/mm3  08/10/22  07:21    


 


Lymphocytes # (Manual)  0.5 K/mm3 (1.2-5.4)  L  08/10/22  07:21    


 


Abs React Lymphs (Man)  0.0 K/mm3  08/10/22  07:21    


 


Monocytes # (Manual)  0.0 K/mm3 (0.0-0.8)   08/10/22  07:21    


 


Eosinophils # (Manual)  0.0 K/mm3 (0.0-0.4)   08/10/22  07:21    


 


Basophils # (Manual)  0.0 K/mm3 (0.0-0.1)   08/10/22  07:21    


 


Metamyelocytes #  0.0 K/mm3  08/10/22  07:21    


 


Myelocytes #  0.1 K/mm3  08/10/22  07:21    


 


Promyelocytes #  0.0 K/mm3  08/10/22  07:21    


 


Blast Cells #  0.0 K/mm3  08/10/22  07:21    


 


WBC Morphology  Not Reportable   08/10/22  07:21    


 


Hypersegmented Neuts  Not Reportable   08/10/22  07:21    


 


Hyposegmented Neuts  Not Reportable   08/10/22  07:21    


 


Hypogranular Neuts  Not Reportable   08/10/22  07:21    


 


Smudge Cells  Not Reportable   08/10/22  07:21    


 


Toxic Granulation  Not Reportable   08/10/22  07:21    


 


Toxic Vacuolation  Not Reportable   08/10/22  07:21    


 


Dohle Bodies  Not Reportable   08/10/22  07:21    


 


Pelger-Huet Anomaly  Not Reportable   08/10/22  07:21    


 


Komal Rods  Not Reportable   08/10/22  07:21    


 


Platelet Estimate  Consistent w auto   08/10/22  07:21    


 


Clumped Platelets  Not Reportable   08/10/22  07:21    


 


Plt Clumps, EDTA  Not Reportable   08/10/22  07:21    


 


Large Platelets  Not Reportable   08/10/22  07:21    


 


Giant Platelets  Not Reportable   08/10/22  07:21    


 


Platelet Satelliting  Not Reportable   08/10/22  07:21    


 


Plt Morphology Comment  Not Reportable   08/10/22  07:21    


 


RBC Morphology  Not Reportable   08/10/22  07:21    


 


Dimorphic RBCs  Not Reportable   08/10/22  07:21    


 


Polychromasia  Not Reportable   08/10/22  07:21    


 


Hypochromasia  Not Reportable   08/10/22  07:21    


 


Poikilocytosis  Not Reportable   08/10/22  07:21    


 


Anisocytosis  1+   08/10/22  07:21    


 


Microcytosis  Not Reportable   08/10/22  07:21    


 


Macrocytosis  Not Reportable   08/10/22  07:21    


 


Spherocytes  Not Reportable   08/10/22  07:21    


 


Pappenheimer Bodies  Not Reportable   08/10/22  07:21    


 


Sickle Cells  Not Reportable   08/10/22  07:21    


 


Target Cells  Not Reportable   08/10/22  07:21    


 


Tear Drop Cells  Not Reportable   08/10/22  07:21    


 


Ovalocytes  Not Reportable   08/10/22  07:21    


 


Helmet Cells  Not Reportable   08/10/22  07:21    


 


[place] Bodies  Not Reportable   08/10/22  07:21    


 


Cabot Rings  Not Reportable   08/10/22  07:21    


 


[place] Cells  Not Reportable   08/10/22  07:21    


 


Bite Cells  Not Reportable   08/10/22  07:21    


 


Crenated Cell  Not Reportable   08/10/22  07:21    


 


Elliptocytes  Not Reportable   08/10/22  07:21    


 


Acanthocytes (Spur)  Not Reportable   08/10/22  07:21    


 


Rouleaux  Not Reportable   08/10/22  07:21    


 


Hemoglobin C Crystals  Not Reportable   08/10/22  07:21    


 


Schistocytes  Not Reportable   08/10/22  07:21    


 


Malaria parasites  Not Reportable   08/10/22  07:21    


 


Sarabjit Bodies  Not Reportable   08/10/22  07:21    


 


Hem Pathologist Commnt  No   08/10/22  07:21    


 


PT  13.5 Sec. (12.2-14.9)   08/08/22  14:49    


 


INR  0.91  (0.87-1.13)   08/08/22  14:49    


 


APTT  33.8 Sec. (24.2-36.6)   08/08/22  14:49    


 


D-Dimer  1200.26 ng/mlDDU (0-234)  H  08/11/22  06:22    


 


Sodium  141 mmol/L (137-145)   08/11/22  06:22    


 


Potassium  3.9 mmol/L (3.6-5.0)   08/11/22  06:22    


 


Chloride  101.5 mmol/L ()   08/11/22  06:22    


 


Carbon Dioxide  30 mmol/L (22-30)   08/11/22  06:22    


 


Anion Gap  13 mmol/L  08/11/22  06:22    


 


BUN  50 mg/dL (7-17)  H  08/11/22  06:22    


 


Creatinine  3.4 mg/dL (0.6-1.2)  H  08/11/22  06:22    


 


Estimated GFR  14 ml/min  08/11/22  06:22    


 


BUN/Creatinine Ratio  15 %  08/11/22  06:22    


 


Glucose  99 mg/dL ()   08/11/22  06:22    


 


Calcium  8.2 mg/dL (8.4-10.2)  L  08/11/22  06:22    


 


Ferritin  354.3 ng/mL (10.0-200.0)  H  08/11/22  06:22    


 


Total Bilirubin  0.20 mg/dL (0.1-1.2)   08/08/22  14:49    


 


AST  26 units/L (5-40)   08/08/22  14:49    


 


ALT  14 units/L (7-56)   08/08/22  14:49    


 


Alkaline Phosphatase  70 units/L ()   08/08/22  14:49    


 


Lactate Dehydrogenase  211 units/L ()  H  08/11/22  06:22    


 


Troponin T  0.075 ng/mL (0.00-0.029)  H  08/08/22  14:49    


 


C-Reactive Protein  2.10 mg/dL (0.00-1.30)  H  08/11/22  06:22    


 


NT-Pro-B Natriuret Pep  45068 pg/mL (0-900)  H  08/08/22  14:49    


 


Total Protein  5.6 g/dL (6.3-8.2)  L  08/08/22  14:49    


 


Albumin  2.8 g/dL (3.9-5)  L  08/08/22  14:49    


 


Albumin/Globulin Ratio  1.0 %  08/08/22  14:49    


 


Triglycerides  128 mg/dL (2-149)   08/08/22  14:49    


 


Cholesterol  154 mg/dL ()   08/08/22  14:49    


 


LDL Cholesterol Direct  63 mg/dL ()   08/08/22  14:49    


 


HDL Cholesterol  64 mg/dL (40-59)  H  08/08/22  14:49    


 


Cholesterol/HDL Ratio  2.40 %  08/08/22  14:49    


 


Nasal Screen MRSA (PCR)  Negative  (Negative)   08/09/22  02:09    


 


Coronavirus (PCR)  Positive  (Negative)  A  08/10/22  10:00    


 


Hepatitis A IgM Ab  Non-reactive  (NonReactive)   08/09/22  10:27    


 


Hep Bs Antigen  Non-reactive  (Negative)   08/09/22  10:27    


 


Hep B Core IgM Ab  Non-reactive  (NonReactive)   08/09/22  10:27    


 


Hepatitis C Antibody  Non-reactive  (NonReactive)   08/09/22  10:27    











Microbiology: 


Microbiology





08/08/22 16:49   Peripheral/Venous   Blood Culture - Preliminary


                            NO GROWTH AFTER 48 HOURS


08/08/22 16:49   Peripheral/Venous   Blood Culture - Preliminary


                            NO GROWTH AFTER 48 HOURS








Chang/IV: 


                                        





Voiding Method                   External Female Catheter











Active Medications





- Current Medications


Current Medications: 














Generic Name Dose Route Start Last Admin





  Trade Name Freq  PRN Reason Stop Dose Admin


 


Acetaminophen  650 mg  08/08/22 23:21  08/09/22 23:29





  Acetaminophen 325 Mg Tab  PO   650 mg





  Q4H PRN   Administration





  Pain MILD(1-3)/Fever >100.5/HA  


 


Albuterol  2.5 mg  08/08/22 23:21 





  Albuterol 2.5 Mg/3 Ml Nebu  IH  





  Q3HRT PRN  





  Shortness Of Breath  


 


Ascorbic Acid  500 mg  08/09/22 10:00  08/11/22 10:20





  Ascorbic Acid 500 Mg Tab  PO   500 mg





  DAILY BERHANE   Administration


 


Azithromycin  500 mg  08/11/22 10:00  08/11/22 10:13





  Azithromycin 250 Mg Tab  PO  08/13/22 10:01  500 mg





  QDAY BERHANE   Administration





  Protocol  


 


Carvedilol  12.5 mg  08/09/22 10:00  08/11/22 10:13





  Carvedilol 12.5 Mg Tab  PO   12.5 mg





  BID BERHANE   Administration


 


Dexamethasone  8 mg  08/09/22 10:00  08/11/22 10:13





  Dexamethasone 4 Mg/Ml Vial  IV  08/18/22 10:01  8 mg





  DAILY BERHANE   Administration


 


Famotidine  20 mg  08/09/22 10:00  08/11/22 10:13





  Famotidine 20 Mg Tab  PO   20 mg





  DAILY BERHANE   Administration


 


Ferrous Sulfate  325 mg  08/09/22 10:00  08/11/22 10:13





  Ferrous Sulfate 325 Mg Tab  PO   325 mg





  DAILY BERHANE   Administration


 


Furosemide  40 mg  08/09/22 07:15  08/11/22 05:07





  Furosemide 40 Mg/4 Ml Inj  IV   40 mg





  0600,1800 BERHANE   Administration


 


Guaifenesin  200 mg  08/09/22 18:23  08/11/22 10:20





  Guaifenesin 100 Mg/5 Ml Oral Liqd  PO   200 mg





  Q4H PRN   Administration





  Cough  


 


Heparin Sodium (Porcine)  5,000 unit  08/09/22 06:00  08/11/22 05:07





  Heparin 5,000 Unit/1 Ml Vial  SUB-Q   5,000 unit





  Q8HR BERHANE   Administration


 


Sodium Chloride  100 mls @ 999 mls/hr  08/09/22 10:30 





  Nacl 0.9%  IV  





  NASH PRN  





  Hypotension  


 


Levothyroxine Sodium  200 mcg  08/09/22 06:00  08/11/22 05:07





  Levothyroxine 100 Mcg Tab  PO   200 mcg





  QAM@0600 BERHANE   Administration


 


Morphine Sulfate  2 mg  08/08/22 23:21 





  Morphine 2 Mg/1 Ml Inj  IV  





  Q4H PRN  





  Pain, Moderate (4-6)  


 


Morphine Sulfate  4 mg  08/08/22 23:21 





  Morphine 4 Mg/1 Ml Inj  IV  





  Q4H PRN  





  Pain , Severe (7-10)  


 


Ondansetron HCl  4 mg  08/08/22 23:21 





  Ondansetron 4 Mg/2 Ml Inj  IV  





  Q8H PRN  





  Nausea And Vomiting  


 


Sevelamer Carbonate  800 mg  08/09/22 08:00  08/11/22 10:12





  Sevelamer Carbonate 800 Mg Tab  PO   800 mg





  TIDWM BERHANE   Administration


 


Sodium Chloride  10 ml  08/09/22 10:00  08/11/22 10:13





  Sodium Chloride 0.9% 10 Ml Flush Syringe  IV   10 ml





  BID BERHANE   Administration


 


Sodium Chloride  10 ml  08/08/22 23:21 





  Sodium Chloride 0.9% 10 Ml Flush Syringe  IV  





  PRN PRN  





  LINE FLUSH  














Nutrition/Malnutrition Assess





- Dietary Evaluation


Nutrition/Malnutrition Findings: 


                                        





Nutrition Notes                                            Start:  08/09/22 

12:04


Freq:                                                      Status: Active       

 


Protocol:                                                                       

 


 Document     08/09/22 12:04  [place]IN  (Rec: 08/09/22 12:32  AUSTIN  [id number])


 Nutrition Notes


     Need for Assessment generated from:         MD Order


     Initial or Follow up                        Assessment


     Current Diagnosis                           CKD (stage V CKD),Hypertension


                                                 ,Respiratory Failure


     Other Pertinent Diagnosis                   ESRD+HD, COVID-19/Pneumonia,


                                                 CHF, Hypothyroidism.


     Current Diet                                Cardiac Diet (since B 08/08),


                                                 Cardiac -Renal- Diet+D Suppl (


                                                 from D 08/09).


     Labs/Tests                                  08/09: BUN 48, Crea 4.0, Glu


                                                 147.


     Pertinent Medications                       08/09: Vit C, FeSO4,


                                                 Levothyroxine, Renvela, others


                                                 nutritionally unremarkable.


     Height                                      5 ft 3 in


     Weight                                      72.575 kg


     Ideal Body Weight (kg)                      52.27


     BMI                                         28.3


     Intake Prior to Admission                   Good


     Weight change and time frame                Pt denies having loss body


                                                 weight PTA.


     Weight Status                               Overweight


     Subjective/Other Information                RD consult for dietary


                                                 supplementation assessment.


                                                 No reports available on Pt's


                                                 PO intake of meals at the time


                                                 , will assess at F/U.


                                                 I will prescribe dietary


                                                 supplements to compensate for


                                                 possible poor or insufficient


                                                 PO intake of meals during LOS.


                                                 I will recommend renal


                                                 restriction to current diet,


                                                 to support Pt's ESRD condition


                                                 during LOS.


                                                 Pt is on High Flow Nasal


                                                 Cannula, O2 saturation @ 91%,


                                                 according to Physical


                                                 Assessment History notes.


                                                 Pt has missing teeth,


                                                 according to Physical


                                                 Assessment History notes.


                                                 Pt is a SNF resident,


                                                 according to Progress notes.


     Percent of energy/protein needs met:        Prescribed Cardiac -Renal-


                                                 Diet provides for energy/


                                                 protein needs (2,230 Kcal/85 g


                                                 ) during LOS; additionally,


                                                 Dietary Supplements will


                                                 compensate for possible poor


                                                 or insufficient PO intake of


                                                 meals with 850 Kcal and 38 g


                                                 of protein.


     Burn                                        Absent


     Trauma                                      Absent


     GI Symptoms                                 None


     Food Allergy                                No


     Skin Integrity/Comment                      Assessment WNL.


     Minimum of two criteria                     No


     Fluid Accumulation                          N/A


     Reduced  Strength                       N/A (non-severe)


     Protein-Calorie Malnutrition                N\A


     #2


      Nutrition Diagnosis                        Altered nutrition-related


                                                 laboratory values


      Etiology                                   CKD V, ESRD.


      As Evidenced by Signs and Symptoms         08/09: BUN 48, Crea 4.0, Glu


                                                 147.


     #1


      Nutrition Diagnosis                        Predicted suboptimal energy


                                                 intake


      Etiology                                   Ongoing and concomitant


                                                 chronic metabolic conditions.


      As Evidenced by Signs and Symptoms         No reports available on Pt's


                                                 PO intake of meals at the time


                                                 , MD request for dietary


                                                 supplements.


     Is patient on ventilator?                   No


     Is Patient Ambulatory and/or Out of Bed     No


     REE-([place]-St. Jeor-confined to bed)      1528.644


     Kcal/Kg value to use for calculation        19


     Approximate Energy Requirements Using       1379


      kcal/Kg                                    


     Calculation Used for Recommendations        Kcal/kg


     Additional Notes                            Protein: >1.2 g/Kg ABW; >88 g/


                                                 day.


                                                 Fluids: 1 ml/Kcal, or as per


                                                 MD.


 Nutrition Intervention


     Change Diet Order:                          Add Renal restriction to


                                                 Cardiac Diet, and continue as


                                                 tolerated.


     Add Supplement/Snack (indicate name/kcal    Start 8 fl oz Nepro w/


      /protein )                                 CARBSTEADY; BID


     Provides kCal:                              850


     Provides Protein (gm)                       38


     Goal #1                                     Compensate, through dietary


                                                 supplementation, for possible


                                                 poor or insufficient PO intake


                                                 of meals during LOS.


     Goal #2                                     Help reach and maintain


                                                 acceptable chemistry lab


                                                 values during LOS.


     Goal #3                                     Adjust the dietary


                                                 intervention to better serve


                                                 Pt's needs and clinical


                                                 conditions during LOS.


     Follow-Up By:                               08/16/22


     Additional Comments                         Continue monitoring food


                                                 tolerance, %PO intake of meals


                                                 , dietary supplements, and BM.

## 2022-08-11 NOTE — PROGRESS NOTE
Assessment and Plan





60 y/o with acute respiratory failure secondary to COVID 19





8/11/22:  Continue steroids.  Actemra ordered by ID.  PRone if possible during 

the day and sleep prone at night.  Needs volume removal with HD, based on 

vitals, BP should tolerate it.  Guarded prognosis.





1.  Dexamethasone as ordered


2.  Agree with ID and use of actemra


3. If able to prone, suggest prone as tolerated during the day and sleep prone 

at night


4.  Wean FiO2 and flow for sats >88%


5.  HD per renal, need to keep daily net negative state if possible


6.  Guarded prognosis.





Subjective


Date of service: 08/11/22


Interval history: 





No acute events.  Significant drop in HFNC requirement in the last 24 hours.  

Should have HD today





Objective


                               Vital Signs - 12hr











  08/10/22 08/11/22





  23:34 00:24


 


Temperature 98.8 F 


 


Pulse Rate 84 


 


Respiratory 18 21





Rate  


 


Blood Pressure 150/55 





[Left]  


 


O2 Sat by Pulse 95 100





Oximetry  











CBC and BMP: 


                                 08/11/22 06:22





                                 08/11/22 06:22


ABG, PT/INR, D-dimer: 


PT/INR, D-dimer











PT  13.5 Sec. (12.2-14.9)   08/08/22  14:49    


 


INR  0.91  (0.87-1.13)   08/08/22  14:49    


 


D-Dimer  1200.26 ng/mlDDU (0-234)  H  08/11/22  06:22    








Abnormal lab findings: 


                                  Abnormal Labs











  08/08/22 08/08/22 08/08/22





  14:49 14:49 14:49


 


RBC  3.08 L  


 


Hgb  9.6 L  


 


Hct   


 


MCV  99 H  


 


RDW  17.7 H  


 


Lymph % (Auto)   


 


Lymph # (Auto)   


 


Seg Neutrophils %   


 


Seg Neuts % (Manual)  78.0 H  


 


Lymphocytes % (Manual)  3.0 L  


 


Lymphocytes # (Manual)  0.3 L  


 


D-Dimer   


 


Sodium   133 L 


 


Carbon Dioxide   21 L 


 


BUN   39 H 


 


Creatinine   3.7 H 


 


Glucose   


 


Calcium   8.2 L 


 


Ferritin   


 


Lactate Dehydrogenase   


 


Troponin T    0.075 H


 


C-Reactive Protein   


 


NT-Pro-B Natriuret Pep    22042 H


 


Total Protein   5.6 L 


 


Albumin   2.8 L 


 


HDL Cholesterol    64 H


 


Coronavirus (PCR)   














  08/09/22 08/09/22 08/09/22





  08:04 08:04 08:04


 


RBC   


 


Hgb   


 


Hct   


 


MCV   


 


RDW   


 


Lymph % (Auto)   


 


Lymph # (Auto)   


 


Seg Neutrophils %   


 


Seg Neuts % (Manual)   


 


Lymphocytes % (Manual)   


 


Lymphocytes # (Manual)   


 


D-Dimer   1044.30 H 


 


Sodium   


 


Carbon Dioxide   


 


BUN  48 H  


 


Creatinine  4.0 H  


 


Glucose  147 H  


 


Calcium   


 


Ferritin    458.0 H


 


Lactate Dehydrogenase   


 


Troponin T   


 


C-Reactive Protein  10.50 H  


 


NT-Pro-B Natriuret Pep   


 


Total Protein   


 


Albumin   


 


HDL Cholesterol   


 


Coronavirus (PCR)   














  08/10/22 08/10/22 08/10/22





  07:21 07:21 10:00


 


RBC  2.83 L  


 


Hgb  8.8 L  


 


Hct  27.9 L  


 


MCV  98 H  


 


RDW  17.2 H  


 


Lymph % (Auto)   


 


Lymph # (Auto)   


 


Seg Neutrophils %   


 


Seg Neuts % (Manual)  93.0 H  


 


Lymphocytes % (Manual)  6.0 L  


 


Lymphocytes # (Manual)  0.5 L  


 


D-Dimer   


 


Sodium   


 


Carbon Dioxide   31 H 


 


BUN   29 H 


 


Creatinine   2.6 H 


 


Glucose   


 


Calcium   8.2 L 


 


Ferritin   


 


Lactate Dehydrogenase   


 


Troponin T   


 


C-Reactive Protein   


 


NT-Pro-B Natriuret Pep   


 


Total Protein   


 


Albumin   


 


HDL Cholesterol   


 


Coronavirus (PCR)    Positive A














  08/11/22 08/11/22 08/11/22





  06:22 06:22 06:22


 


RBC  2.65 L  


 


Hgb  8.2 L  


 


Hct  25.9 L  


 


MCV  98 H  


 


RDW  16.7 H  


 


Lymph % (Auto)  5.9 L  


 


Lymph # (Auto)  0.3 L  


 


Seg Neutrophils %  89.9 H  


 


Seg Neuts % (Manual)   


 


Lymphocytes % (Manual)   


 


Lymphocytes # (Manual)   


 


D-Dimer   1200.26 H 


 


Sodium   


 


Carbon Dioxide   


 


BUN    50 H


 


Creatinine    3.4 H


 


Glucose   


 


Calcium    8.2 L


 


Ferritin   


 


Lactate Dehydrogenase    211 H


 


Troponin T   


 


C-Reactive Protein    2.10 H


 


NT-Pro-B Natriuret Pep   


 


Total Protein   


 


Albumin   


 


HDL Cholesterol   


 


Coronavirus (PCR)   














  08/11/22





  06:22


 


RBC 


 


Hgb 


 


Hct 


 


MCV 


 


RDW 


 


Lymph % (Auto) 


 


Lymph # (Auto) 


 


Seg Neutrophils % 


 


Seg Neuts % (Manual) 


 


Lymphocytes % (Manual) 


 


Lymphocytes # (Manual) 


 


D-Dimer 


 


Sodium 


 


Carbon Dioxide 


 


BUN 


 


Creatinine 


 


Glucose 


 


Calcium 


 


Ferritin  354.3 H


 


Lactate Dehydrogenase 


 


Troponin T 


 


C-Reactive Protein 


 


NT-Pro-B Natriuret Pep 


 


Total Protein 


 


Albumin 


 


HDL Cholesterol 


 


Coronavirus (PCR)

## 2022-08-12 RX ADMIN — SEVELAMER CARBONATE SCH MG: 800 TABLET, FILM COATED ORAL at 16:51

## 2022-08-12 RX ADMIN — FERROUS SULFATE TAB 325 MG (65 MG ELEMENTAL FE) SCH MG: 325 (65 FE) TAB at 09:19

## 2022-08-12 RX ADMIN — Medication SCH ML: at 22:22

## 2022-08-12 RX ADMIN — LEVOTHYROXINE SODIUM SCH MCG: 0.1 TABLET ORAL at 05:19

## 2022-08-12 RX ADMIN — ACETAMINOPHEN PRN MG: 325 TABLET ORAL at 20:16

## 2022-08-12 RX ADMIN — GUAIFENESIN PRN MG: 100 SOLUTION ORAL at 09:20

## 2022-08-12 RX ADMIN — SEVELAMER CARBONATE SCH MG: 800 TABLET, FILM COATED ORAL at 09:19

## 2022-08-12 RX ADMIN — OXYCODONE HYDROCHLORIDE AND ACETAMINOPHEN SCH: 500 TABLET ORAL at 09:20

## 2022-08-12 RX ADMIN — SEVELAMER CARBONATE SCH MG: 800 TABLET, FILM COATED ORAL at 11:44

## 2022-08-12 RX ADMIN — GUAIFENESIN PRN MG: 100 SOLUTION ORAL at 13:40

## 2022-08-12 RX ADMIN — FUROSEMIDE SCH MG: 10 INJECTION, SOLUTION INTRAVENOUS at 05:19

## 2022-08-12 RX ADMIN — HEPARIN SODIUM SCH UNIT: 5000 INJECTION, SOLUTION INTRAVENOUS; SUBCUTANEOUS at 05:20

## 2022-08-12 RX ADMIN — HEPARIN SODIUM SCH UNIT: 5000 INJECTION, SOLUTION INTRAVENOUS; SUBCUTANEOUS at 22:20

## 2022-08-12 RX ADMIN — HEPARIN SODIUM SCH UNIT: 5000 INJECTION, SOLUTION INTRAVENOUS; SUBCUTANEOUS at 13:38

## 2022-08-12 RX ADMIN — FAMOTIDINE SCH MG: 20 TABLET ORAL at 09:19

## 2022-08-12 RX ADMIN — Medication SCH ML: at 09:20

## 2022-08-12 NOTE — PROGRESS NOTE
Assessment and Plan





62 y/o with acute respiratory failure secondary to COVID 19





8/12/22:  HD really hepled with volume removal.  Should get HD again tomorrow.  

Same recs regarding proning.  Patient not on O2 at home so will need walk test 

prior to discharge.





8/11/22:  Continue steroids.  Actemra ordered by ID.  PRone if possible during 

the day and sleep prone at night.  Needs volume removal with HD, based on 

vitals, BP should tolerate it.  Guarded prognosis.





1.  Dexamethasone as ordered


2.  Agree with ID and use of actemra


3. If able to prone, suggest prone as tolerated during the day and sleep prone 

at night


4.  Wean FiO2 and flow for sats >88%


5.  HD per renal, need to keep daily net negative state if possible


6.  Guarded prognosis.





Subjective


Date of service: 08/12/22


Interval history: 





Patient now down to 4 liters NC.  Good sats.  Had HD yesterday.  2.5 liters of 

fluid removed.





Objective


                               Vital Signs - 12hr











  08/12/22 08/12/22 08/12/22





  06:29 08:00 09:14


 


Temperature 98.6 F  99.1 F


 


Pulse Rate 70  


 


Respiratory 22  16





Rate   


 


Blood Pressure 173/62  169/88


 


O2 Sat by Pulse 95 98 





Oximetry   














  08/12/22 08/12/22 08/12/22





  09:19 09:36 09:39


 


Temperature   


 


Pulse Rate 76 75 


 


Respiratory   21





Rate   


 


Blood Pressure 169/88  


 


O2 Sat by Pulse  97 100





Oximetry   














  08/12/22





  12:08


 


Temperature 


 


Pulse Rate 


 


Respiratory 





Rate 


 


Blood Pressure 


 


O2 Sat by Pulse 95





Oximetry 











CBC and BMP: 


                                 08/11/22 06:22





                                 08/11/22 06:22


ABG, PT/INR, D-dimer: 


PT/INR, D-dimer











PT  13.5 Sec. (12.2-14.9)   08/08/22  14:49    


 


INR  0.91  (0.87-1.13)   08/08/22  14:49    


 


D-Dimer  1200.26 ng/mlDDU (0-234)  H  08/11/22  06:22    








Abnormal lab findings: 


                                  Abnormal Labs











  08/08/22 08/08/22 08/08/22





  14:49 14:49 14:49


 


RBC  3.08 L  


 


Hgb  9.6 L  


 


Hct   


 


MCV  99 H  


 


RDW  17.7 H  


 


Lymph % (Auto)   


 


Lymph # (Auto)   


 


Seg Neutrophils %   


 


Seg Neuts % (Manual)  78.0 H  


 


Lymphocytes % (Manual)  3.0 L  


 


Lymphocytes # (Manual)  0.3 L  


 


D-Dimer   


 


Sodium   133 L 


 


Carbon Dioxide   21 L 


 


BUN   39 H 


 


Creatinine   3.7 H 


 


Glucose   


 


Calcium   8.2 L 


 


Ferritin   


 


Lactate Dehydrogenase   


 


Troponin T    0.075 H


 


C-Reactive Protein   


 


NT-Pro-B Natriuret Pep    08075 H


 


Total Protein   5.6 L 


 


Albumin   2.8 L 


 


HDL Cholesterol    64 H


 


Coronavirus (PCR)   














  08/09/22 08/09/22 08/09/22





  08:04 08:04 08:04


 


RBC   


 


Hgb   


 


Hct   


 


MCV   


 


RDW   


 


Lymph % (Auto)   


 


Lymph # (Auto)   


 


Seg Neutrophils %   


 


Seg Neuts % (Manual)   


 


Lymphocytes % (Manual)   


 


Lymphocytes # (Manual)   


 


D-Dimer   1044.30 H 


 


Sodium   


 


Carbon Dioxide   


 


BUN  48 H  


 


Creatinine  4.0 H  


 


Glucose  147 H  


 


Calcium   


 


Ferritin    458.0 H


 


Lactate Dehydrogenase   


 


Troponin T   


 


C-Reactive Protein  10.50 H  


 


NT-Pro-B Natriuret Pep   


 


Total Protein   


 


Albumin   


 


HDL Cholesterol   


 


Coronavirus (PCR)   














  08/10/22 08/10/22 08/10/22





  07:21 07:21 10:00


 


RBC  2.83 L  


 


Hgb  8.8 L  


 


Hct  27.9 L  


 


MCV  98 H  


 


RDW  17.2 H  


 


Lymph % (Auto)   


 


Lymph # (Auto)   


 


Seg Neutrophils %   


 


Seg Neuts % (Manual)  93.0 H  


 


Lymphocytes % (Manual)  6.0 L  


 


Lymphocytes # (Manual)  0.5 L  


 


D-Dimer   


 


Sodium   


 


Carbon Dioxide   31 H 


 


BUN   29 H 


 


Creatinine   2.6 H 


 


Glucose   


 


Calcium   8.2 L 


 


Ferritin   


 


Lactate Dehydrogenase   


 


Troponin T   


 


C-Reactive Protein   


 


NT-Pro-B Natriuret Pep   


 


Total Protein   


 


Albumin   


 


HDL Cholesterol   


 


Coronavirus (PCR)    Positive A














  08/11/22 08/11/22 08/11/22





  06:22 06:22 06:22


 


RBC  2.65 L  


 


Hgb  8.2 L  


 


Hct  25.9 L  


 


MCV  98 H  


 


RDW  16.7 H  


 


Lymph % (Auto)  5.9 L  


 


Lymph # (Auto)  0.3 L  


 


Seg Neutrophils %  89.9 H  


 


Seg Neuts % (Manual)   


 


Lymphocytes % (Manual)   


 


Lymphocytes # (Manual)   


 


D-Dimer   1200.26 H 


 


Sodium   


 


Carbon Dioxide   


 


BUN    50 H


 


Creatinine    3.4 H


 


Glucose   


 


Calcium    8.2 L


 


Ferritin   


 


Lactate Dehydrogenase    211 H


 


Troponin T   


 


C-Reactive Protein    2.10 H


 


NT-Pro-B Natriuret Pep   


 


Total Protein   


 


Albumin   


 


HDL Cholesterol   


 


Coronavirus (PCR)   














  08/11/22





  06:22


 


RBC 


 


Hgb 


 


Hct 


 


MCV 


 


RDW 


 


Lymph % (Auto) 


 


Lymph # (Auto) 


 


Seg Neutrophils % 


 


Seg Neuts % (Manual) 


 


Lymphocytes % (Manual) 


 


Lymphocytes # (Manual) 


 


D-Dimer 


 


Sodium 


 


Carbon Dioxide 


 


BUN 


 


Creatinine 


 


Glucose 


 


Calcium 


 


Ferritin  354.3 H


 


Lactate Dehydrogenase 


 


Troponin T 


 


C-Reactive Protein 


 


NT-Pro-B Natriuret Pep 


 


Total Protein 


 


Albumin 


 


HDL Cholesterol 


 


Coronavirus (PCR)

## 2022-08-12 NOTE — PROGRESS NOTE
Assessment and Plan


Assessment and plan: 








#COVID-19 pneumonia


#Acute hypoxic respiratory failure-improving


- etiology: Secondary to COVID-19 pneumonia + possible acute heart failure


- baseline oxygen requirements: Room air


- supplemental oxygen: High flow nasal cannula 12 L / 40% FiO2


- Continue protocol: continue pulse oximetry, wean oxygen as tolerated, 

dexamethasone 8 mg daily x10 days, azithromycin 250 mg daily x5 days (completes 

on 8/12/2022), airborne and droplet precautions


-Infectious disease consulted; appreciate recs.  Patient not currently candidate

for remdesivir given renal function.


 Pulmonology consulted; appreciate recs


- continue to monitor





#Possible acute heart failure-ruled out


- Continue CHF exacerbation protocol: Telemetry, Strict I/O, monitor urine 

output every shift, daily weights, afterload reduction, low-sodium diet, and 

fluid restriction of approximately 1.5 mL/day, IV Lasix 40 mg twice daily


- Supplemental oxygen: High flow nasal cannula 10 L / 70% FiO2


- ProBNP on admission: 13,672


- TTE (8/8/2022) revealing EF 55-60%, normal-sized LV, normal LV systolic 

function, mild concentric LVH with mild diastolic dysfunction, and RVSP is 41 

mmHg.


- Continue to monitor





#Elevated D-dimer


 D-dimer 1044


 Unremarkable bilateral venous Dopplers to evaluate for possible DVT.  Continue

to monitor.





#ESRD on hemodialysis


-Access: Permacath in right upper chest


-Outpatient schedule: Unknown


-HD center: N/A


-Nephrology consulted; appreciate recs. 


-Renally dose medications and avoid nephrotoxic drugs. Renal diet.





#Macrocytic anemia


 Hemoglobin 8.8


 Transfuse if hemoglobin <7 or patient becomes symptomatic.  Continue to 

monitor.





#Hypothyroidism


 Continue home levothyroxine 200 mcg daily





#Mild protein caloric malnutrition


 Albumin 2.5


Continue dietary supplementation








Critical Care Billing:


The high probability of a clinically significant, sudden or life threatening 

deterioration of the [respiratory] system(s) required my full and direct 

attention, intervention and personal management. The aggregate critical care 

time was [60] minutes. This time is in addition to time spent performing 

reported procedures but includes the following: 





[x] Data Review and interpretation 





[x] Patient assessment and monitoring of vital signs 





[x] Documentation 





[x] Medication orders and management








#Discharge planning


- Patient is pending further respiratory improvement.


- Case management has been made aware. 


- Discharge is tentatively 24-72 hours


Disposition Plan: Continue medical management


Total Time Spent with Patient (Minutes): 45 min 





History


Interval history: 





No acute events overnight. 





Hospitalist Physical





- Constitutional


Vitals: 


                                        











Temp Pulse Resp BP Pulse Ox


 


 98.6 F   76   22   169/88   95 


 


 08/12/22 06:29  08/12/22 09:19  08/12/22 06:29  08/12/22 09:19  08/12/22 06:29











General appearance: Present: no acute distress, well-nourished





- EENT


Eyes: Present: PERRL, EOM intact


ENT: hearing intact, clear oral mucosa, dentition normal





- Neck


Neck: Present: supple, normal ROM





- Respiratory


Respiratory effort: normal


Respiratory: bilateral: diminished (on HFNC 12L/40% FiO2)





- Cardiovascular


Rhythm: regular


Heart Sounds: Present: S1 & S2





- Extremities


Extremities: no ischemia, pulses intact, pulses symmetrical, No edema, normal 

temperature, normal color


Peripheral Pulses: within normal limits





- Abdominal


General gastrointestinal: soft, non-tender, non-distended, normal bowel sounds





- Integumentary


Integumentary: Present: clear, warm, dry





- Psychiatric


Psychiatric: appropriate mood/affect, intact judgment & insight, memory intact, 

cooperative





- Neurologic


Neurologic: CNII-XII intact, moves all extremities





- Allied Health


Allied health notes reviewed: nursing





HEART Score





- HEART Score


Troponin: 


                                        











Troponin T  0.075 ng/mL (0.00-0.029)  H  08/08/22  14:49    














Results





- Labs


CBC & Chem 7: 


                                 08/11/22 06:22





                                 08/11/22 06:22


Labs: 


                             Laboratory Last Values











WBC  5.3 K/mm3 (4.5-11.0)   08/11/22  06:22    


 


RBC  2.65 M/mm3 (3.65-5.03)  L  08/11/22  06:22    


 


Hgb  8.2 gm/dl (10.1-14.3)  L  08/11/22  06:22    


 


Hct  25.9 % (30.3-42.9)  L  08/11/22  06:22    


 


MCV  98 fl (79-97)  H  08/11/22  06:22    


 


MCH  31 pg (28-32)   08/11/22  06:22    


 


MCHC  32 % (30-34)   08/11/22  06:22    


 


RDW  16.7 % (13.2-15.2)  H  08/11/22  06:22    


 


Plt Count  172 K/mm3 (140-440)   08/11/22  06:22    


 


Lymph % (Auto)  5.9 % (13.4-35.0)  L  08/11/22  06:22    


 


Mono % (Auto)  4.1 % (0.0-7.3)   08/11/22  06:22    


 


Eos % (Auto)  0.0 % (0.0-4.3)   08/11/22  06:22    


 


Baso % (Auto)  0.1 % (0.0-1.8)   08/11/22  06:22    


 


Lymph # (Auto)  0.3 K/mm3 (1.2-5.4)  L  08/11/22  06:22    


 


Mono # (Auto)  0.2 K/mm3 (0.0-0.8)   08/11/22  06:22    


 


Eos # (Auto)  0.0 K/mm3 (0.0-0.4)   08/11/22  06:22    


 


Baso # (Auto)  0.0 K/mm3 (0.0-0.1)   08/11/22  06:22    


 


Add Manual Diff  Complete   08/10/22  07:21    


 


Total Counted  100   08/10/22  07:21    


 


Seg Neutrophils %  89.9 % (40.0-70.0)  H  08/11/22  06:22    


 


Seg Neuts % (Manual)  93.0 % (40.0-70.0)  H  08/10/22  07:21    


 


Band Neutrophils %  0 %  08/10/22  07:21    


 


Lymphocytes % (Manual)  6.0 % (13.4-35.0)  L  08/10/22  07:21    


 


Reactive Lymphs % (Man)  0 %  08/10/22  07:21    


 


Monocytes % (Manual)  0 % (0.0-7.3)   08/10/22  07:21    


 


Eosinophils % (Manual)  0 % (0.0-4.3)   08/10/22  07:21    


 


Basophils % (Manual)  0 % (0.0-1.8)   08/10/22  07:21    


 


Metamyelocytes %  0 %  08/10/22  07:21    


 


Myelocytes %  1.0 %  08/10/22  07:21    


 


Promyelocytes %  0 %  08/10/22  07:21    


 


Blast Cells %  0 %  08/10/22  07:21    


 


Nucleated RBC %  Not Reportable   08/10/22  07:21    


 


Seg Neutrophils #  4.8 K/mm3 (1.8-7.7)   08/11/22  06:22    


 


Seg Neutrophils # Man  7.4 K/mm3 (1.8-7.7)   08/10/22  07:21    


 


Band Neutrophils #  0.0 K/mm3  08/10/22  07:21    


 


Lymphocytes # (Manual)  0.5 K/mm3 (1.2-5.4)  L  08/10/22  07:21    


 


Abs React Lymphs (Man)  0.0 K/mm3  08/10/22  07:21    


 


Monocytes # (Manual)  0.0 K/mm3 (0.0-0.8)   08/10/22  07:21    


 


Eosinophils # (Manual)  0.0 K/mm3 (0.0-0.4)   08/10/22  07:21    


 


Basophils # (Manual)  0.0 K/mm3 (0.0-0.1)   08/10/22  07:21    


 


Metamyelocytes #  0.0 K/mm3  08/10/22  07:21    


 


Myelocytes #  0.1 K/mm3  08/10/22  07:21    


 


Promyelocytes #  0.0 K/mm3  08/10/22  07:21    


 


Blast Cells #  0.0 K/mm3  08/10/22  07:21    


 


WBC Morphology  Not Reportable   08/10/22  07:21    


 


Hypersegmented Neuts  Not Reportable   08/10/22  07:21    


 


Hyposegmented Neuts  Not Reportable   08/10/22  07:21    


 


Hypogranular Neuts  Not Reportable   08/10/22  07:21    


 


Smudge Cells  Not Reportable   08/10/22  07:21    


 


Toxic Granulation  Not Reportable   08/10/22  07:21    


 


Toxic Vacuolation  Not Reportable   08/10/22  07:21    


 


Dohle Bodies  Not Reportable   08/10/22  07:21    


 


Pelger-Huet Anomaly  Not Reportable   08/10/22  07:21    


 


Komal Rods  Not Reportable   08/10/22  07:21    


 


Platelet Estimate  Consistent w auto   08/10/22  07:21    


 


Clumped Platelets  Not Reportable   08/10/22  07:21    


 


Plt Clumps, EDTA  Not Reportable   08/10/22  07:21    


 


Large Platelets  Not Reportable   08/10/22  07:21    


 


Giant Platelets  Not Reportable   08/10/22  07:21    


 


Platelet Satelliting  Not Reportable   08/10/22  07:21    


 


Plt Morphology Comment  Not Reportable   08/10/22  07:21    


 


RBC Morphology  Not Reportable   08/10/22  07:21    


 


Dimorphic RBCs  Not Reportable   08/10/22  07:21    


 


Polychromasia  Not Reportable   08/10/22  07:21    


 


Hypochromasia  Not Reportable   08/10/22  07:21    


 


Poikilocytosis  Not Reportable   08/10/22  07:21    


 


Anisocytosis  1+   08/10/22  07:21    


 


Microcytosis  Not Reportable   08/10/22  07:21    


 


Macrocytosis  Not Reportable   08/10/22  07:21    


 


Spherocytes  Not Reportable   08/10/22  07:21    


 


Pappenheimer Bodies  Not Reportable   08/10/22  07:21    


 


Sickle Cells  Not Reportable   08/10/22  07:21    


 


Target Cells  Not Reportable   08/10/22  07:21    


 


Tear Drop Cells  Not Reportable   08/10/22  07:21    


 


Ovalocytes  Not Reportable   08/10/22  07:21    


 


Helmet Cells  Not Reportable   08/10/22  07:21    


 


Price-Hallock Bodies  Not Reportable   08/10/22  07:21    


 


Cabot Rings  Not Reportable   08/10/22  07:21    


 


Douglass Cells  Not Reportable   08/10/22  07:21    


 


Bite Cells  Not Reportable   08/10/22  07:21    


 


Crenated Cell  Not Reportable   08/10/22  07:21    


 


Elliptocytes  Not Reportable   08/10/22  07:21    


 


Acanthocytes (Spur)  Not Reportable   08/10/22  07:21    


 


Rouleaux  Not Reportable   08/10/22  07:21    


 


Hemoglobin C Crystals  Not Reportable   08/10/22  07:21    


 


Schistocytes  Not Reportable   08/10/22  07:21    


 


Malaria parasites  Not Reportable   08/10/22  07:21    


 


Sarabjit Bodies  Not Reportable   08/10/22  07:21    


 


Hem Pathologist Commnt  No   08/10/22  07:21    


 


PT  13.5 Sec. (12.2-14.9)   08/08/22  14:49    


 


INR  0.91  (0.87-1.13)   08/08/22  14:49    


 


APTT  33.8 Sec. (24.2-36.6)   08/08/22  14:49    


 


D-Dimer  1200.26 ng/mlDDU (0-234)  H  08/11/22  06:22    


 


Sodium  141 mmol/L (137-145)   08/11/22  06:22    


 


Potassium  3.9 mmol/L (3.6-5.0)   08/11/22  06:22    


 


Chloride  101.5 mmol/L ()   08/11/22  06:22    


 


Carbon Dioxide  30 mmol/L (22-30)   08/11/22  06:22    


 


Anion Gap  13 mmol/L  08/11/22  06:22    


 


BUN  50 mg/dL (7-17)  H  08/11/22  06:22    


 


Creatinine  3.4 mg/dL (0.6-1.2)  H  08/11/22  06:22    


 


Estimated GFR  14 ml/min  08/11/22  06:22    


 


BUN/Creatinine Ratio  15 %  08/11/22  06:22    


 


Glucose  99 mg/dL ()   08/11/22  06:22    


 


Calcium  8.2 mg/dL (8.4-10.2)  L  08/11/22  06:22    


 


Ferritin  354.3 ng/mL (10.0-200.0)  H  08/11/22  06:22    


 


Total Bilirubin  0.20 mg/dL (0.1-1.2)   08/08/22  14:49    


 


AST  26 units/L (5-40)   08/08/22  14:49    


 


ALT  14 units/L (7-56)   08/08/22  14:49    


 


Alkaline Phosphatase  70 units/L ()   08/08/22  14:49    


 


Lactate Dehydrogenase  211 units/L ()  H  08/11/22  06:22    


 


Troponin T  0.075 ng/mL (0.00-0.029)  H  08/08/22  14:49    


 


C-Reactive Protein  2.10 mg/dL (0.00-1.30)  H  08/11/22  06:22    


 


NT-Pro-B Natriuret Pep  23743 pg/mL (0-900)  H  08/08/22  14:49    


 


Total Protein  5.6 g/dL (6.3-8.2)  L  08/08/22  14:49    


 


Albumin  2.8 g/dL (3.9-5)  L  08/08/22  14:49    


 


Albumin/Globulin Ratio  1.0 %  08/08/22  14:49    


 


Triglycerides  128 mg/dL (2-149)   08/08/22  14:49    


 


Cholesterol  154 mg/dL ()   08/08/22  14:49    


 


LDL Cholesterol Direct  63 mg/dL ()   08/08/22  14:49    


 


HDL Cholesterol  64 mg/dL (40-59)  H  08/08/22  14:49    


 


Cholesterol/HDL Ratio  2.40 %  08/08/22  14:49    


 


Nasal Screen MRSA (PCR)  Negative  (Negative)   08/09/22  02:09    


 


Coronavirus (PCR)  Positive  (Negative)  A  08/10/22  10:00    


 


Hepatitis A IgM Ab  Non-reactive  (NonReactive)   08/09/22  10:27    


 


Hep Bs Antigen  Non-reactive  (Negative)   08/09/22  10:27    


 


Hep B Core IgM Ab  Non-reactive  (NonReactive)   08/09/22  10:27    


 


Hepatitis C Antibody  Non-reactive  (NonReactive)   08/09/22  10:27    











Microbiology: 


Microbiology





08/08/22 16:49   Peripheral/Venous   Blood Culture - Preliminary


                            NO GROWTH AFTER 72 HOURS


08/08/22 16:49   Peripheral/Venous   Blood Culture - Preliminary


                            NO GROWTH AFTER 72 HOURS








Chang/IV: 


                                        





Voiding Method                   External Female Catheter











Active Medications





- Current Medications


Current Medications: 














Generic Name Dose Route Start Last Admin





  Trade Name Freq  PRN Reason Stop Dose Admin


 


Acetaminophen  650 mg  08/08/22 23:21  08/11/22 20:02





  Acetaminophen 325 Mg Tab  PO   650 mg





  Q4H PRN   Administration





  Pain MILD(1-3)/Fever >100.5/HA  


 


Albuterol  2.5 mg  08/08/22 23:21 





  Albuterol 2.5 Mg/3 Ml Nebu  IH  





  Q3HRT PRN  





  Shortness Of Breath  


 


Ascorbic Acid  500 mg  08/09/22 10:00  08/12/22 09:20





  Ascorbic Acid 500 Mg Tab  PO   Not Given





  DAILY BERHANE  


 


Carvedilol  12.5 mg  08/09/22 10:00  08/12/22 09:19





  Carvedilol 12.5 Mg Tab  PO   12.5 mg





  BID BERHANE   Administration


 


Dexamethasone  8 mg  08/09/22 10:00  08/12/22 09:19





  Dexamethasone 4 Mg/Ml Vial  IV  08/18/22 10:01  8 mg





  DAILY BERHANE   Administration


 


Epoetin Diony-epbx  20,000 unit  08/11/22 14:10  08/11/22 17:20





  Epoetin Diony-Epbx 20,000 Unit/1 Ml Vial  IV   20,000 unit





  NASH PRN   Administration





  hemodialysis  


 


Famotidine  20 mg  08/09/22 10:00  08/12/22 09:19





  Famotidine 20 Mg Tab  PO   20 mg





  DAILY BERHANE   Administration


 


Ferrous Sulfate  325 mg  08/09/22 10:00  08/12/22 09:19





  Ferrous Sulfate 325 Mg Tab  PO   325 mg





  DAILY BERHANE   Administration


 


Guaifenesin  200 mg  08/09/22 18:23  08/12/22 09:20





  Guaifenesin 100 Mg/5 Ml Oral Liqd  PO   200 mg





  Q4H PRN   Administration





  Cough  


 


Heparin Sodium (Porcine)  5,000 unit  08/09/22 06:00  08/12/22 05:20





  Heparin 5,000 Unit/1 Ml Vial  SUB-Q   5,000 unit





  Q8HR BERHANE   Administration


 


Sodium Chloride  100 mls @ 999 mls/hr  08/09/22 10:30 





  Nacl 0.9%  IV  





  NASH PRN  





  Hypotension  


 


Levothyroxine Sodium  200 mcg  08/09/22 06:00  08/12/22 05:19





  Levothyroxine 100 Mcg Tab  PO   200 mcg





  QAM@0600 BERHANE   Administration


 


Morphine Sulfate  2 mg  08/08/22 23:21 





  Morphine 2 Mg/1 Ml Inj  IV  





  Q4H PRN  





  Pain, Moderate (4-6)  


 


Morphine Sulfate  4 mg  08/08/22 23:21 





  Morphine 4 Mg/1 Ml Inj  IV  





  Q4H PRN  





  Pain , Severe (7-10)  


 


Ondansetron HCl  4 mg  08/08/22 23:21 





  Ondansetron 4 Mg/2 Ml Inj  IV  





  Q8H PRN  





  Nausea And Vomiting  


 


Sevelamer Carbonate  800 mg  08/09/22 08:00  08/12/22 09:19





  Sevelamer Carbonate 800 Mg Tab  PO   800 mg





  TIDWM BERHANE   Administration


 


Sodium Chloride  10 ml  08/09/22 10:00  08/12/22 09:20





  Sodium Chloride 0.9% 10 Ml Flush Syringe  IV   10 ml





  BID BERHANE   Administration


 


Sodium Chloride  10 ml  08/08/22 23:21 





  Sodium Chloride 0.9% 10 Ml Flush Syringe  IV  





  PRN PRN  





  LINE FLUSH  














Nutrition/Malnutrition Assess





- Dietary Evaluation


Nutrition/Malnutrition Findings: 


                                        





Nutrition Notes                                            Start:  08/09/22 

12:04


Freq:                                                      Status: Active       

 


Protocol:                                                                       

 


 Document     08/09/22 12:04  [place]  (Rec: 08/09/22 12:32  [place]IN  [id number])


 Nutrition Notes


     Need for Assessment generated from:         MD Order


     Initial or Follow up                        Assessment


     Current Diagnosis                           CKD (stage V CKD),Hypertension


                                                 ,Respiratory Failure


     Other Pertinent Diagnosis                   ESRD+HD, COVID-19/Pneumonia,


                                                 CHF, Hypothyroidism.


     Current Diet                                Cardiac Diet (since B 08/08),


                                                 Cardiac -Renal- Diet+D Suppl (


                                                 from D 08/09).


     Labs/Tests                                  08/09: BUN 48, Crea 4.0, Glu


                                                 147.


     Pertinent Medications                       08/09: Vit C, FeSO4,


                                                 Levothyroxine, Renvela, others


                                                 nutritionally unremarkable.


     Height                                      5 ft 3 in


     Weight                                      72.575 kg


     Ideal Body Weight (kg)                      52.27


     BMI                                         28.3


     Intake Prior to Admission                   Good


     Weight change and time frame                Pt denies having loss body


                                                 weight PTA.


     Weight Status                               Overweight


     Subjective/Other Information                RD consult for dietary


                                                 supplementation assessment.


                                                 No reports available on Pt's


                                                 PO intake of meals at the time


                                                 , will assess at F/U.


                                                 I will prescribe dietary


                                                 supplements to compensate for


                                                 possible poor or insufficient


                                                 PO intake of meals during LOS.


                                                 I will recommend renal


                                                 restriction to current diet,


                                                 to support Pt's ESRD condition


                                                 during LOS.


                                                 Pt is on High Flow Nasal


                                                 Cannula, O2 saturation @ 91%,


                                                 according to Physical


                                                 Assessment History notes.


                                                 Pt has missing teeth,


                                                 according to Physical


                                                 Assessment History notes.


                                                 Pt is a SNF resident,


                                                 according to Progress notes.


     Percent of energy/protein needs met:        Prescribed Cardiac -Renal-


                                                 Diet provides for energy/


                                                 protein needs (2,230 Kcal/85 g


                                                 ) during LOS; additionally,


                                                 Dietary Supplements will


                                                 compensate for possible poor


                                                 or insufficient PO intake of


                                                 meals with 850 Kcal and 38 g


                                                 of protein.


     Burn                                        Absent


     Trauma                                      Absent


     GI Symptoms                                 None


     Food Allergy                                No


     Skin Integrity/Comment                      Assessment WNL.


     Minimum of two criteria                     No


     Fluid Accumulation                          N/A


     Reduced  Strength                       N/A (non-severe)


     Protein-Calorie Malnutrition                N\A


     #2


      Nutrition Diagnosis                        Altered nutrition-related


                                                 laboratory values


      Etiology                                   CKD V, ESRD.


      As Evidenced by Signs and Symptoms         08/09: BUN 48, Crea 4.0, Glu


                                                 147.


     #1


      Nutrition Diagnosis                        Predicted suboptimal energy


                                                 intake


      Etiology                                   Ongoing and concomitant


                                                 chronic metabolic conditions.


      As Evidenced by Signs and Symptoms         No reports available on Pt's


                                                 PO intake of meals at the time


                                                 , MD request for dietary


                                                 supplements.


     Is patient on ventilator?                   No


     Is Patient Ambulatory and/or Out of Bed     No


     REE-(Seymour-St. Jeor-confined to bed)      1528.644


     Kcal/Kg value to use for calculation        19


     Approximate Energy Requirements Using       1379


      kcal/Kg                                    


     Calculation Used for Recommendations        Kcal/kg


     Additional Notes                            Protein: >1.2 g/Kg ABW; >88 g/


                                                 day.


                                                 Fluids: 1 ml/Kcal, or as per


                                                 MD.


 Nutrition Intervention


     Change Diet Order:                          Add Renal restriction to


                                                 Cardiac Diet, and continue as


                                                 tolerated.


     Add Supplement/Snack (indicate name/kcal    Start 8 fl oz Nepro w/


      /protein )                                 CARBSTEADY; BID


     Provides kCal:                              850


     Provides Protein (gm)                       38


     Goal #1                                     Compensate, through dietary


                                                 supplementation, for possible


                                                 poor or insufficient PO intake


                                                 of meals during LOS.


     Goal #2                                     Help reach and maintain


                                                 acceptable chemistry lab


                                                 values during LOS.


     Goal #3                                     Adjust the dietary


                                                 intervention to better serve


                                                 Pt's needs and clinical


                                                 conditions during LOS.


     Follow-Up By:                               08/16/22


     Additional Comments                         Continue monitoring food


                                                 tolerance, %PO intake of meals


                                                 , dietary supplements, and BM.

## 2022-08-12 NOTE — PROGRESS NOTE
Assessment and Plan





Cultures:


SARS CoV2 PCR: Positive 


8/8/2022 blood culture: no growth 





A/P:


61-year-old female with ESRD on HD, hypertension, hypothyroidism, nursing home 

resident was brought into the emergency room due to shortness of breath:





#Bilateral pneumonia: Secondary to COVID-19. High CRP, requiring HFNC. Not a 

candidate for remdesivir. Got Actemra. 





#Acute hypoxic respiratory failure: Requiring HFNC, improving. 





#ESRD on HD: Renally adjust antibiotics





#CHF





Recs:


-continue IV/PO Dexamethasone x 10 days


-completed abx


-Not a candidate for remdesivir due to renal failure


-s/p Actemra 8 mg/kg x 1 on 8/10/2022


-prophylactic anticoagulation based on d-dimer per hospital protocol








Sneha Thompson MD, FACP, TL [facility] ([facility])


O: [phone]


F: [phone]


C: [phone]





Subjective


Date of service: 08/12/22


Interval history: 





No fever.  Oxygen is at 12 L/min. Eating her breakfast. Feeling better. 





Objective





- Exam


Narrative Exam: 





Physical Exam: 


Constitutional: Alert, cooperative. No distress. 


Head, Ears, Nose: Normocephalic, atraumatic. External ears, nose normal


Eyes: Conjunctivae/corneas clear. No icterus. No ptosis.


Neck: Supple, no meningeal signs


Cardiovascular: S1, S2 +


Respiratory: AE fair bilaterally


GI: Soft, non-tender; bowel sounds normal. No peritoneal signs


Musculoskeletal: No pedal edema, no cyanosis. PermCath +


Skin: No rash or abscess


Hem/Lymphatic: No palpable cervical or supraclavicular nodes. No lymphangitis


Psych: Mood ok. Affect normal


Neurological: Awake, alert, oriented.   





- Constitutional


Vitals: 


                                   Vital Signs











Temp Pulse Resp BP Pulse Ox


 


 99.1 F   75   21   169/88   100 


 


 08/12/22 09:14  08/12/22 09:36  08/12/22 09:39  08/12/22 09:19  08/12/22 09:39








                           Temperature -Last 24 Hours











Temperature                    99.1 F


 


Temperature                    98.6 F


 


Temperature                    98.1 F


 


Temperature                    97.8 F


 


Temperature                    98.0 F


 


Temperature                    98.6 F

















- Labs


CBC & Chem 7: 


                                 08/11/22 06:22





                                 08/11/22 06:22

## 2022-08-12 NOTE — PROGRESS NOTE
Assessment and Plan





This is a 61 year old woman who presents with dyspnea, COVID-19





# ESRD: continue HD Tu/Th/Sa or prn, due tomorrow.  S/p treatment yesterday, UF 

2.5L


- daily labs


- renally dose meds


- avoid nephrotoxins


- renal diet


- verbal consent obtained for HD





# Anemia: last hemoglobin 9.6->8.8->8.2, restarted ESAs with HD TIW  





# HTN: UF as tolerated, aim net negative as able.  BP stable





# Secondary Hyperparathyroidism: continue home binders as needed, vitamin D 

analogs prn





# COVID-19 Pneumonia, Respiratory Failure: improving, pulmonary/ID also 

following





# CHF: note BNP 13K on admission, UF as tolerated





Subjective


Date of service: 08/12/22


Interval history: 





Chart, labs, vitals reviewed.  Weaned off HFNC





Objective





- Exam


Narrative Exam: 





No direct examination today due to COVID-19, need to limit PPE





- Vital Signs


Vital signs: 


                               Vital Signs - 12hr











  08/12/22 08/12/22 08/12/22





  06:29 08:00 09:14


 


Temperature 98.6 F  99.1 F


 


Pulse Rate 70  


 


Respiratory 22  16





Rate   


 


Blood Pressure 173/62  169/88


 


O2 Sat by Pulse 95 98 





Oximetry   














  08/12/22 08/12/22 08/12/22





  09:19 09:36 09:39


 


Temperature   


 


Pulse Rate 76 75 


 


Respiratory   21





Rate   


 


Blood Pressure 169/88  


 


O2 Sat by Pulse  97 100





Oximetry   














  08/12/22





  12:08


 


Temperature 


 


Pulse Rate 


 


Respiratory 





Rate 


 


Blood Pressure 


 


O2 Sat by Pulse 95





Oximetry 














- Lab





                                 08/11/22 06:22





                                 08/11/22 06:22


                             Most recent lab results











Calcium  8.2 mg/dL (8.4-10.2)  L  08/11/22  06:22    














Medications & Allergies





- Medications


Allergies/Adverse Reactions: 


                                    Allergies





NSAIDS (Non-Steroidal Anti-Inflamma Allergy (Verified 08/08/22 13:30)


   Rash


Sulfa (Sulfonamide Antibiotics) Allergy (Verified 08/08/22 13:30)


   Rash








Home Medications: 


                                Home Medications











 Medication  Instructions  Recorded  Confirmed  Last Taken  Type


 


Ascorbic Acid [Vitamin C] 500 mg PO DAILY 08/08/22 08/08/22 Unknown History


 


Ferrous Sulfate [Ferrous Sulfate 324 mg PO DAILY 08/08/22 08/08/22 Unknown 

History





324 MG]     


 


Levothyroxine Sodium 200 mcg PO DAILY 08/08/22 08/08/22 Unknown History





[Levothyroxine]     


 


Venlafaxine [Effexor 37.5mg tab] 37.5 mg PO QDAY 08/08/22 08/08/22 Unknown 

History


 


carvediloL [Coreg] 12.5 mg PO BID 08/08/22 08/08/22 Unknown History


 


sevelamer HCL [Sevelamer HCl] 800 mg PO TID 08/08/22 08/08/22 Unknown History











Active Medications: 














Generic Name Dose Route Start Last Admin





  Trade Name Freq  PRN Reason Stop Dose Admin


 


Acetaminophen  650 mg  08/08/22 23:21  08/11/22 20:02





  Acetaminophen 325 Mg Tab  PO   650 mg





  Q4H PRN   Administration





  Pain MILD(1-3)/Fever >100.5/HA  


 


Albuterol  2.5 mg  08/08/22 23:21 





  Albuterol 2.5 Mg/3 Ml Nebu  IH  





  Q3HRT PRN  





  Shortness Of Breath  


 


Ascorbic Acid  500 mg  08/09/22 10:00  08/12/22 09:20





  Ascorbic Acid 500 Mg Tab  PO   Not Given





  DAILY BERHANE  


 


Carvedilol  12.5 mg  08/09/22 10:00  08/12/22 09:19





  Carvedilol 12.5 Mg Tab  PO   12.5 mg





  BID BERHANE   Administration


 


Dexamethasone  8 mg  08/09/22 10:00  08/12/22 09:19





  Dexamethasone 4 Mg/Ml Vial  IV  08/18/22 10:01  8 mg





  DAILY BERHANE   Administration


 


Epoetin Diony-epbx  20,000 unit  08/11/22 14:10  08/11/22 17:20





  Epoetin Diony-Epbx 20,000 Unit/1 Ml Vial  IV   20,000 unit





  NASH PRN   Administration





  hemodialysis  


 


Famotidine  20 mg  08/09/22 10:00  08/12/22 09:19





  Famotidine 20 Mg Tab  PO   20 mg





  DAILY BERHANE   Administration


 


Ferrous Sulfate  325 mg  08/09/22 10:00  08/12/22 09:19





  Ferrous Sulfate 325 Mg Tab  PO   325 mg





  DAILY BERHANE   Administration


 


Guaifenesin  200 mg  08/09/22 18:23  08/12/22 13:40





  Guaifenesin 100 Mg/5 Ml Oral Liqd  PO   200 mg





  Q4H PRN   Administration





  Cough  


 


Heparin Sodium (Porcine)  5,000 unit  08/09/22 06:00  08/12/22 13:38





  Heparin 5,000 Unit/1 Ml Vial  SUB-Q   5,000 unit





  Q8HR BERHANE   Administration


 


Sodium Chloride  100 mls @ 999 mls/hr  08/09/22 10:30 





  Nacl 0.9%  IV  





  NASH PRN  





  Hypotension  


 


Levothyroxine Sodium  200 mcg  08/09/22 06:00  08/12/22 05:19





  Levothyroxine 100 Mcg Tab  PO   200 mcg





  QAM@0600 BERHANE   Administration


 


Morphine Sulfate  2 mg  08/08/22 23:21 





  Morphine 2 Mg/1 Ml Inj  IV  





  Q4H PRN  





  Pain, Moderate (4-6)  


 


Morphine Sulfate  4 mg  08/08/22 23:21 





  Morphine 4 Mg/1 Ml Inj  IV  





  Q4H PRN  





  Pain , Severe (7-10)  


 


Ondansetron HCl  4 mg  08/08/22 23:21 





  Ondansetron 4 Mg/2 Ml Inj  IV  





  Q8H PRN  





  Nausea And Vomiting  


 


Sevelamer Carbonate  800 mg  08/09/22 08:00  08/12/22 11:44





  Sevelamer Carbonate 800 Mg Tab  PO   800 mg





  TIDWM BERHANE   Administration


 


Sodium Chloride  10 ml  08/09/22 10:00  08/12/22 09:20





  Sodium Chloride 0.9% 10 Ml Flush Syringe  IV   10 ml





  BID BERHANE   Administration


 


Sodium Chloride  10 ml  08/08/22 23:21 





  Sodium Chloride 0.9% 10 Ml Flush Syringe  IV  





  PRN PRN  





  LINE FLUSH

## 2022-08-13 PROCEDURE — 5A1D70Z PERFORMANCE OF URINARY FILTRATION, INTERMITTENT, LESS THAN 6 HOURS PER DAY: ICD-10-PCS | Performed by: INTERNAL MEDICINE

## 2022-08-13 RX ADMIN — SEVELAMER CARBONATE SCH MG: 800 TABLET, FILM COATED ORAL at 09:05

## 2022-08-13 RX ADMIN — SEVELAMER CARBONATE SCH MG: 800 TABLET, FILM COATED ORAL at 17:02

## 2022-08-13 RX ADMIN — LEVOTHYROXINE SODIUM SCH MCG: 0.1 TABLET ORAL at 05:48

## 2022-08-13 RX ADMIN — HEPARIN SODIUM SCH UNIT: 5000 INJECTION, SOLUTION INTRAVENOUS; SUBCUTANEOUS at 15:28

## 2022-08-13 RX ADMIN — NIFEDIPINE SCH MG: 60 TABLET, EXTENDED RELEASE ORAL at 09:50

## 2022-08-13 RX ADMIN — FERROUS SULFATE TAB 325 MG (65 MG ELEMENTAL FE) SCH MG: 325 (65 FE) TAB at 09:05

## 2022-08-13 RX ADMIN — FAMOTIDINE SCH MG: 20 TABLET ORAL at 09:05

## 2022-08-13 RX ADMIN — ACETAMINOPHEN PRN MG: 325 TABLET ORAL at 09:13

## 2022-08-13 RX ADMIN — SEVELAMER CARBONATE SCH MG: 800 TABLET, FILM COATED ORAL at 11:15

## 2022-08-13 RX ADMIN — HEPARIN SODIUM SCH UNIT: 5000 INJECTION, SOLUTION INTRAVENOUS; SUBCUTANEOUS at 05:48

## 2022-08-13 RX ADMIN — Medication SCH ML: at 09:05

## 2022-08-13 RX ADMIN — GUAIFENESIN PRN MG: 100 SOLUTION ORAL at 19:25

## 2022-08-13 RX ADMIN — OXYCODONE HYDROCHLORIDE AND ACETAMINOPHEN SCH MG: 500 TABLET ORAL at 09:05

## 2022-08-13 RX ADMIN — Medication SCH ML: at 22:04

## 2022-08-13 RX ADMIN — HEPARIN SODIUM SCH UNIT: 5000 INJECTION, SOLUTION INTRAVENOUS; SUBCUTANEOUS at 21:59

## 2022-08-13 NOTE — PROGRESS NOTE
Assessment and Plan





60 y/o with acute respiratory failure secondary to COVID 19





8/13/22:  Continue to wean FiO2 to off for sats > 88% as patient is not on oxy

gen at home.  Steroids.  Prone daily and sleep prone at night if possible.  Will

continue to follow.





8/12/22:  HD really hepled with volume removal.  Should get HD again tomorrow.  

Same recs regarding proning.  Patient not on O2 at home so will need walk test 

prior to discharge.





8/11/22:  Continue steroids.  Actemra ordered by ID.  PRone if possible during 

the day and sleep prone at night.  Needs volume removal with HD, based on 

vitals, BP should tolerate it.  Guarded prognosis.





1.  Dexamethasone as ordered


2.  Agree with ID and use of actemra


3. If able to prone, suggest prone as tolerated during the day and sleep prone 

at night


4.  Wean FiO2 and flow for sats >88%


5.  HD per renal, need to keep daily net negative state if possible


6.  Guarded prognosis.





Subjective


Date of service: 08/13/22


Interval history: 





No acute events.  Down to 3 liters.   Good sats.





Objective


                               Vital Signs - 12hr











  08/13/22 08/13/22 08/13/22





  02:26 06:29 10:14


 


Temperature  99.0 F 


 


Pulse Rate  74 


 


Respiratory  18 





Rate   


 


Blood Pressure  184/86 


 


O2 Sat by Pulse 96 98 99





Oximetry   











CBC and BMP: 


                                 08/11/22 06:22





                                 08/11/22 06:22


ABG, PT/INR, D-dimer: 


PT/INR, D-dimer











PT  13.5 Sec. (12.2-14.9)   08/08/22  14:49    


 


INR  0.91  (0.87-1.13)   08/08/22  14:49    


 


D-Dimer  1200.26 ng/mlDDU (0-234)  H  08/11/22  06:22    








Abnormal lab findings: 


                                  Abnormal Labs











  08/08/22 08/08/22 08/08/22





  14:49 14:49 14:49


 


RBC  3.08 L  


 


Hgb  9.6 L  


 


Hct   


 


MCV  99 H  


 


RDW  17.7 H  


 


Lymph % (Auto)   


 


Lymph # (Auto)   


 


Seg Neutrophils %   


 


Seg Neuts % (Manual)  78.0 H  


 


Lymphocytes % (Manual)  3.0 L  


 


Lymphocytes # (Manual)  0.3 L  


 


D-Dimer   


 


Sodium   133 L 


 


Carbon Dioxide   21 L 


 


BUN   39 H 


 


Creatinine   3.7 H 


 


Glucose   


 


Calcium   8.2 L 


 


Ferritin   


 


Lactate Dehydrogenase   


 


Troponin T    0.075 H


 


C-Reactive Protein   


 


NT-Pro-B Natriuret Pep    67243 H


 


Total Protein   5.6 L 


 


Albumin   2.8 L 


 


HDL Cholesterol    64 H


 


Coronavirus (PCR)   














  08/09/22 08/09/22 08/09/22





  08:04 08:04 08:04


 


RBC   


 


Hgb   


 


Hct   


 


MCV   


 


RDW   


 


Lymph % (Auto)   


 


Lymph # (Auto)   


 


Seg Neutrophils %   


 


Seg Neuts % (Manual)   


 


Lymphocytes % (Manual)   


 


Lymphocytes # (Manual)   


 


D-Dimer   1044.30 H 


 


Sodium   


 


Carbon Dioxide   


 


BUN  48 H  


 


Creatinine  4.0 H  


 


Glucose  147 H  


 


Calcium   


 


Ferritin    458.0 H


 


Lactate Dehydrogenase   


 


Troponin T   


 


C-Reactive Protein  10.50 H  


 


NT-Pro-B Natriuret Pep   


 


Total Protein   


 


Albumin   


 


HDL Cholesterol   


 


Coronavirus (PCR)   














  08/10/22 08/10/22 08/10/22





  07:21 07:21 10:00


 


RBC  2.83 L  


 


Hgb  8.8 L  


 


Hct  27.9 L  


 


MCV  98 H  


 


RDW  17.2 H  


 


Lymph % (Auto)   


 


Lymph # (Auto)   


 


Seg Neutrophils %   


 


Seg Neuts % (Manual)  93.0 H  


 


Lymphocytes % (Manual)  6.0 L  


 


Lymphocytes # (Manual)  0.5 L  


 


D-Dimer   


 


Sodium   


 


Carbon Dioxide   31 H 


 


BUN   29 H 


 


Creatinine   2.6 H 


 


Glucose   


 


Calcium   8.2 L 


 


Ferritin   


 


Lactate Dehydrogenase   


 


Troponin T   


 


C-Reactive Protein   


 


NT-Pro-B Natriuret Pep   


 


Total Protein   


 


Albumin   


 


HDL Cholesterol   


 


Coronavirus (PCR)    Positive A














  08/11/22 08/11/22 08/11/22





  06:22 06:22 06:22


 


RBC  2.65 L  


 


Hgb  8.2 L  


 


Hct  25.9 L  


 


MCV  98 H  


 


RDW  16.7 H  


 


Lymph % (Auto)  5.9 L  


 


Lymph # (Auto)  0.3 L  


 


Seg Neutrophils %  89.9 H  


 


Seg Neuts % (Manual)   


 


Lymphocytes % (Manual)   


 


Lymphocytes # (Manual)   


 


D-Dimer   1200.26 H 


 


Sodium   


 


Carbon Dioxide   


 


BUN    50 H


 


Creatinine    3.4 H


 


Glucose   


 


Calcium    8.2 L


 


Ferritin   


 


Lactate Dehydrogenase    211 H


 


Troponin T   


 


C-Reactive Protein    2.10 H


 


NT-Pro-B Natriuret Pep   


 


Total Protein   


 


Albumin   


 


HDL Cholesterol   


 


Coronavirus (PCR)   














  08/11/22





  06:22


 


RBC 


 


Hgb 


 


Hct 


 


MCV 


 


RDW 


 


Lymph % (Auto) 


 


Lymph # (Auto) 


 


Seg Neutrophils % 


 


Seg Neuts % (Manual) 


 


Lymphocytes % (Manual) 


 


Lymphocytes # (Manual) 


 


D-Dimer 


 


Sodium 


 


Carbon Dioxide 


 


BUN 


 


Creatinine 


 


Glucose 


 


Calcium 


 


Ferritin  354.3 H


 


Lactate Dehydrogenase 


 


Troponin T 


 


C-Reactive Protein 


 


NT-Pro-B Natriuret Pep 


 


Total Protein 


 


Albumin 


 


HDL Cholesterol 


 


Coronavirus (PCR)

## 2022-08-13 NOTE — PROGRESS NOTE
Assessment and Plan





This is a 61 year old woman who presents with dyspnea, COVID-19





# ESRD: continue HD Tu/Th/Sa or prn, due today


- daily labs


- renally dose meds


- avoid nephrotoxins


- renal diet


- verbal consent obtained for HD





# Anemia: last hemoglobin 9.6->8.8->8.2, restarted ESAs with HD TIW  





# HTN: UF as tolerated, aim net negative as able.  BP stable





# Secondary Hyperparathyroidism: continue home binders as needed, vitamin D 

analogs prn





# COVID-19 Pneumonia, Respiratory Failure: improving, pulmonary/ID also 

following





# CHF: note BNP 13K on admission, UF as tolerated





Subjective


Date of service: 08/13/22


Interval history: 





Chart, labs, vitals reviewed.  Weaned off HFNC





Objective





- Exam


Narrative Exam: 





No direct examination today due to COVID-19, need to limit PPE





- Vital Signs


Vital signs: 


                               Vital Signs - 12hr











  08/13/22 08/13/22 08/13/22





  02:26 06:29 10:14


 


Temperature  99.0 F 


 


Pulse Rate  74 


 


Respiratory  18 





Rate   


 


Blood Pressure  184/86 


 


O2 Sat by Pulse 96 98 99





Oximetry   














- Lab





                                 08/11/22 06:22





                                 08/11/22 06:22


                             Most recent lab results











Calcium  8.2 mg/dL (8.4-10.2)  L  08/11/22  06:22    














Medications & Allergies





- Medications


Allergies/Adverse Reactions: 


                                    Allergies





NSAIDS (Non-Steroidal Anti-Inflamma Allergy (Verified 08/08/22 13:30)


   Rash


Sulfa (Sulfonamide Antibiotics) Allergy (Verified 08/08/22 13:30)


   Rash








Home Medications: 


                                Home Medications











 Medication  Instructions  Recorded  Confirmed  Last Taken  Type


 


Ascorbic Acid [Vitamin C] 500 mg PO DAILY 08/08/22 08/08/22 Unknown History


 


Ferrous Sulfate [Ferrous Sulfate 324 mg PO DAILY 08/08/22 08/08/22 Unknown 

History





324 MG]     


 


Levothyroxine Sodium 200 mcg PO DAILY 08/08/22 08/08/22 Unknown History





[Levothyroxine]     


 


Venlafaxine [Effexor 37.5mg tab] 37.5 mg PO QDAY 08/08/22 08/08/22 Unknown 

History


 


carvediloL [Coreg] 12.5 mg PO BID 08/08/22 08/08/22 Unknown History


 


sevelamer HCL [Sevelamer HCl] 800 mg PO TID 08/08/22 08/08/22 Unknown History











Active Medications: 














Generic Name Dose Route Start Last Admin





  Trade Name Freq  PRN Reason Stop Dose Admin


 


Acetaminophen  650 mg  08/08/22 23:21  08/13/22 09:13





  Acetaminophen 325 Mg Tab  PO   650 mg





  Q4H PRN   Administration





  Pain MILD(1-3)/Fever >100.5/HA  


 


Albuterol  2.5 mg  08/08/22 23:21 





  Albuterol 2.5 Mg/3 Ml Nebu  IH  





  Q3HRT PRN  





  Shortness Of Breath  


 


Ascorbic Acid  500 mg  08/09/22 10:00  08/13/22 09:05





  Ascorbic Acid 500 Mg Tab  PO   500 mg





  DAILY BERHANE   Administration


 


Carvedilol  12.5 mg  08/09/22 10:00  08/13/22 09:05





  Carvedilol 12.5 Mg Tab  PO   12.5 mg





  BID BERHANE   Administration


 


Dexamethasone  8 mg  08/09/22 10:00  08/13/22 09:05





  Dexamethasone 4 Mg/Ml Vial  IV  08/18/22 10:01  8 mg





  DAILY BERHANE   Administration


 


Epoetin Diony-epbx  20,000 unit  08/11/22 14:10  08/11/22 17:20





  Epoetin Diony-Epbx 20,000 Unit/1 Ml Vial  IV   20,000 unit





  NASH PRN   Administration





  hemodialysis  


 


Famotidine  20 mg  08/09/22 10:00  08/13/22 09:05





  Famotidine 20 Mg Tab  PO   20 mg





  DAILY BERHANE   Administration


 


Ferrous Sulfate  325 mg  08/09/22 10:00  08/13/22 09:05





  Ferrous Sulfate 325 Mg Tab  PO   325 mg





  DAILY BERHANE   Administration


 


Guaifenesin  200 mg  08/09/22 18:23  08/12/22 13:40





  Guaifenesin 100 Mg/5 Ml Oral Liqd  PO   200 mg





  Q4H PRN   Administration





  Cough  


 


Heparin Sodium (Porcine)  5,000 unit  08/09/22 06:00  08/13/22 05:48





  Heparin 5,000 Unit/1 Ml Vial  SUB-Q   5,000 unit





  Q8HR BERHANE   Administration


 


Sodium Chloride  100 mls @ 999 mls/hr  08/09/22 10:30 





  Nacl 0.9%  IV  





  NASH PRN  





  Hypotension  


 


Levothyroxine Sodium  200 mcg  08/09/22 06:00  08/13/22 05:48





  Levothyroxine 100 Mcg Tab  PO   200 mcg





  QAM@0600 BERHANE   Administration


 


Morphine Sulfate  2 mg  08/08/22 23:21 





  Morphine 2 Mg/1 Ml Inj  IV  





  Q4H PRN  





  Pain, Moderate (4-6)  


 


Morphine Sulfate  4 mg  08/08/22 23:21 





  Morphine 4 Mg/1 Ml Inj  IV  





  Q4H PRN  





  Pain , Severe (7-10)  


 


Nifedipine  60 mg  08/13/22 10:00  08/13/22 09:50





  Nifedipine Xl 60 Mg Tab  PO   60 mg





  Q12HR BERHANE   Administration


 


Ondansetron HCl  4 mg  08/08/22 23:21 





  Ondansetron 4 Mg/2 Ml Inj  IV  





  Q8H PRN  





  Nausea And Vomiting  


 


Sevelamer Carbonate  800 mg  08/09/22 08:00  08/13/22 11:15





  Sevelamer Carbonate 800 Mg Tab  PO   800 mg





  TIDWM BERHANE   Administration


 


Sodium Chloride  10 ml  08/09/22 10:00  08/13/22 09:05





  Sodium Chloride 0.9% 10 Ml Flush Syringe  IV   10 ml





  BID BERHANE   Administration


 


Sodium Chloride  10 ml  08/08/22 23:21 





  Sodium Chloride 0.9% 10 Ml Flush Syringe  IV  





  PRN PRN  





  LINE FLUSH

## 2022-08-14 RX ADMIN — SEVELAMER CARBONATE SCH MG: 800 TABLET, FILM COATED ORAL at 17:24

## 2022-08-14 RX ADMIN — OXYCODONE HYDROCHLORIDE AND ACETAMINOPHEN SCH MG: 500 TABLET ORAL at 09:38

## 2022-08-14 RX ADMIN — SEVELAMER CARBONATE SCH MG: 800 TABLET, FILM COATED ORAL at 09:38

## 2022-08-14 RX ADMIN — ACETAMINOPHEN PRN MG: 325 TABLET ORAL at 20:27

## 2022-08-14 RX ADMIN — SEVELAMER CARBONATE SCH MG: 800 TABLET, FILM COATED ORAL at 12:57

## 2022-08-14 RX ADMIN — NIFEDIPINE SCH: 60 TABLET, EXTENDED RELEASE ORAL at 22:28

## 2022-08-14 RX ADMIN — LEVOTHYROXINE SODIUM SCH MCG: 0.1 TABLET ORAL at 06:29

## 2022-08-14 RX ADMIN — FERROUS SULFATE TAB 325 MG (65 MG ELEMENTAL FE) SCH MG: 325 (65 FE) TAB at 09:39

## 2022-08-14 RX ADMIN — ACETAMINOPHEN PRN MG: 325 TABLET ORAL at 09:51

## 2022-08-14 RX ADMIN — NIFEDIPINE SCH: 60 TABLET, EXTENDED RELEASE ORAL at 06:54

## 2022-08-14 RX ADMIN — HEPARIN SODIUM SCH UNIT: 5000 INJECTION, SOLUTION INTRAVENOUS; SUBCUTANEOUS at 06:30

## 2022-08-14 RX ADMIN — Medication SCH ML: at 09:38

## 2022-08-14 RX ADMIN — HEPARIN SODIUM SCH UNIT: 5000 INJECTION, SOLUTION INTRAVENOUS; SUBCUTANEOUS at 15:19

## 2022-08-14 RX ADMIN — GUAIFENESIN PRN MG: 100 SOLUTION ORAL at 06:30

## 2022-08-14 RX ADMIN — NIFEDIPINE SCH MG: 60 TABLET, EXTENDED RELEASE ORAL at 09:38

## 2022-08-14 RX ADMIN — Medication SCH ML: at 22:29

## 2022-08-14 RX ADMIN — GUAIFENESIN PRN MG: 100 SOLUTION ORAL at 20:27

## 2022-08-14 RX ADMIN — FAMOTIDINE SCH MG: 20 TABLET ORAL at 09:38

## 2022-08-14 RX ADMIN — HEPARIN SODIUM SCH UNIT: 5000 INJECTION, SOLUTION INTRAVENOUS; SUBCUTANEOUS at 22:27

## 2022-08-14 NOTE — PROGRESS NOTE
Assessment and Plan


Assessment and plan: 








#COVID-19 pneumonia


#Acute hypoxic respiratory failure-improving


- etiology: Secondary to COVID-19 pneumonia + possible acute heart failure


- baseline oxygen requirements: Room air


- supplemental oxygen: 3L nasal cannula


- Continue protocol: continue pulse oximetry, wean oxygen as tolerated, 

dexamethasone 8 mg daily x10 days, azithromycin 250 mg daily x5 days (completes 

on 8/12/2022), airborne and droplet precautions


-Infectious disease consulted; appreciate recs.  Patient not currently candidate

for remdesivir given renal function.


 Pulmonology consulted; appreciate recs


- continue to monitor





#Possible acute heart failure-ruled out


- Continue CHF exacerbation protocol: Telemetry, Strict I/O, monitor urine 

output every shift, daily weights, afterload reduction, low-sodium diet, and 

fluid restriction of approximately 1.5 mL/day, IV Lasix 40 mg twice daily


- Supplemental oxygen: High flow nasal cannula 10 L / 70% FiO2


- ProBNP on admission: 13,672


- TTE (8/8/2022) revealing EF 55-60%, normal-sized LV, normal LV systolic 

function, mild concentric LVH with mild diastolic dysfunction, and RVSP is 41 

mmHg.


- Continue to monitor





#Elevated D-dimer


 D-dimer 1044


 Unremarkable bilateral venous Dopplers to evaluate for possible DVT.  Continue

to monitor.





#ESRD on hemodialysis


-Access: Permacath in right upper chest


-Outpatient schedule: Unknown


-HD center: N/A


-Nephrology consulted; appreciate recs. 


-Renally dose medications and avoid nephrotoxic drugs. Renal diet.





#Macrocytic anemia


 Hemoglobin 8.8


 Transfuse if hemoglobin <7 or patient becomes symptomatic.  Continue to 

monitor.





#Hypothyroidism


 Continue home levothyroxine 200 mcg daily





#Mild protein caloric malnutrition


 Albumin 2.5


 Continue dietary supplementation





#Advanced care planning


-Disease education conducted, care plan discussed, diagnoses discussed, 

prognosis discussed, and patient acknowledges understanding with care plan


-Time: +30 min





#Discharge planning


- Patient is pending further respiratory improvement.


- Case management has been made aware. 


- Discharge is tentatively 24 hours


Disposition Plan: Continue medical management


Total Time Spent with Patient (Minutes): 45 min 





History


Interval history: 





No acute events overnight. 





Hospitalist Physical





- Constitutional


Vitals: 


                                        











Temp Pulse Resp BP Pulse Ox


 


 98.1 F   74   17   108/72   96 


 


 08/14/22 06:28  08/14/22 06:28  08/14/22 06:28  08/14/22 06:28  08/14/22 06:28











General appearance: Present: no acute distress, well-nourished





- EENT


Eyes: Present: PERRL, EOM intact


ENT: hearing intact, clear oral mucosa, dentition normal





- Neck


Neck: Present: supple, normal ROM, other (Permacath in R upper chest)





- Respiratory


Respiratory effort: normal


Respiratory: bilateral: diminished (on 3L nasal cannula)





- Cardiovascular


Rhythm: regular


Heart Sounds: Present: S1 & S2





- Extremities


Extremities: no ischemia, pulses intact, pulses symmetrical, No edema, normal 

temperature, normal color


Peripheral Pulses: within normal limits





- Abdominal


General gastrointestinal: soft, non-tender, non-distended, normal bowel sounds





- Integumentary


Integumentary: Present: clear, warm, dry





- Psychiatric


Psychiatric: appropriate mood/affect, intact judgment & insight, memory intact, 

cooperative





- Neurologic


Neurologic: CNII-XII intact, moves all extremities





- Allied Health


Allied health notes reviewed: nursing





HEART Score





- HEART Score


Troponin: 


                                        











Troponin T  0.075 ng/mL (0.00-0.029)  H  08/08/22  14:49    














Results





- Labs


CBC & Chem 7: 


                                 08/11/22 06:22





                                 08/11/22 06:22


Labs: 


                             Laboratory Last Values











WBC  5.3 K/mm3 (4.5-11.0)   08/11/22  06:22    


 


RBC  2.65 M/mm3 (3.65-5.03)  L  08/11/22  06:22    


 


Hgb  8.2 gm/dl (10.1-14.3)  L  08/11/22  06:22    


 


Hct  25.9 % (30.3-42.9)  L  08/11/22  06:22    


 


MCV  98 fl (79-97)  H  08/11/22  06:22    


 


MCH  31 pg (28-32)   08/11/22  06:22    


 


MCHC  32 % (30-34)   08/11/22  06:22    


 


RDW  16.7 % (13.2-15.2)  H  08/11/22  06:22    


 


Plt Count  172 K/mm3 (140-440)   08/11/22  06:22    


 


Lymph % (Auto)  5.9 % (13.4-35.0)  L  08/11/22  06:22    


 


Mono % (Auto)  4.1 % (0.0-7.3)   08/11/22  06:22    


 


Eos % (Auto)  0.0 % (0.0-4.3)   08/11/22  06:22    


 


Baso % (Auto)  0.1 % (0.0-1.8)   08/11/22  06:22    


 


Lymph # (Auto)  0.3 K/mm3 (1.2-5.4)  L  08/11/22  06:22    


 


Mono # (Auto)  0.2 K/mm3 (0.0-0.8)   08/11/22  06:22    


 


Eos # (Auto)  0.0 K/mm3 (0.0-0.4)   08/11/22  06:22    


 


Baso # (Auto)  0.0 K/mm3 (0.0-0.1)   08/11/22  06:22    


 


Add Manual Diff  Complete   08/10/22  07:21    


 


Total Counted  100   08/10/22  07:21    


 


Seg Neutrophils %  89.9 % (40.0-70.0)  H  08/11/22  06:22    


 


Seg Neuts % (Manual)  93.0 % (40.0-70.0)  H  08/10/22  07:21    


 


Band Neutrophils %  0 %  08/10/22  07:21    


 


Lymphocytes % (Manual)  6.0 % (13.4-35.0)  L  08/10/22  07:21    


 


Reactive Lymphs % (Man)  0 %  08/10/22  07:21    


 


Monocytes % (Manual)  0 % (0.0-7.3)   08/10/22  07:21    


 


Eosinophils % (Manual)  0 % (0.0-4.3)   08/10/22  07:21    


 


Basophils % (Manual)  0 % (0.0-1.8)   08/10/22  07:21    


 


Metamyelocytes %  0 %  08/10/22  07:21    


 


Myelocytes %  1.0 %  08/10/22  07:21    


 


Promyelocytes %  0 %  08/10/22  07:21    


 


Blast Cells %  0 %  08/10/22  07:21    


 


Nucleated RBC %  Not Reportable   08/10/22  07:21    


 


Seg Neutrophils #  4.8 K/mm3 (1.8-7.7)   08/11/22  06:22    


 


Seg Neutrophils # Man  7.4 K/mm3 (1.8-7.7)   08/10/22  07:21    


 


Band Neutrophils #  0.0 K/mm3  08/10/22  07:21    


 


Lymphocytes # (Manual)  0.5 K/mm3 (1.2-5.4)  L  08/10/22  07:21    


 


Abs React Lymphs (Man)  0.0 K/mm3  08/10/22  07:21    


 


Monocytes # (Manual)  0.0 K/mm3 (0.0-0.8)   08/10/22  07:21    


 


Eosinophils # (Manual)  0.0 K/mm3 (0.0-0.4)   08/10/22  07:21    


 


Basophils # (Manual)  0.0 K/mm3 (0.0-0.1)   08/10/22  07:21    


 


Metamyelocytes #  0.0 K/mm3  08/10/22  07:21    


 


Myelocytes #  0.1 K/mm3  08/10/22  07:21    


 


Promyelocytes #  0.0 K/mm3  08/10/22  07:21    


 


Blast Cells #  0.0 K/mm3  08/10/22  07:21    


 


WBC Morphology  Not Reportable   08/10/22  07:21    


 


Hypersegmented Neuts  Not Reportable   08/10/22  07:21    


 


Hyposegmented Neuts  Not Reportable   08/10/22  07:21    


 


Hypogranular Neuts  Not Reportable   08/10/22  07:21    


 


Smudge Cells  Not Reportable   08/10/22  07:21    


 


Toxic Granulation  Not Reportable   08/10/22  07:21    


 


Toxic Vacuolation  Not Reportable   08/10/22  07:21    


 


Dohle Bodies  Not Reportable   08/10/22  07:21    


 


Pelger-Huet Anomaly  Not Reportable   08/10/22  07:21    


 


Komal Rods  Not Reportable   08/10/22  07:21    


 


Platelet Estimate  Consistent w auto   08/10/22  07:21    


 


Clumped Platelets  Not Reportable   08/10/22  07:21    


 


Plt Clumps, EDTA  Not Reportable   08/10/22  07:21    


 


Large Platelets  Not Reportable   08/10/22  07:21    


 


Giant Platelets  Not Reportable   08/10/22  07:21    


 


Platelet Satelliting  Not Reportable   08/10/22  07:21    


 


Plt Morphology Comment  Not Reportable   08/10/22  07:21    


 


RBC Morphology  Not Reportable   08/10/22  07:21    


 


Dimorphic RBCs  Not Reportable   08/10/22  07:21    


 


Polychromasia  Not Reportable   08/10/22  07:21    


 


Hypochromasia  Not Reportable   08/10/22  07:21    


 


Poikilocytosis  Not Reportable   08/10/22  07:21    


 


Anisocytosis  1+   08/10/22  07:21    


 


Microcytosis  Not Reportable   08/10/22  07:21    


 


Macrocytosis  Not Reportable   08/10/22  07:21    


 


Spherocytes  Not Reportable   08/10/22  07:21    


 


Pappenheimer Bodies  Not Reportable   08/10/22  07:21    


 


Sickle Cells  Not Reportable   08/10/22  07:21    


 


Target Cells  Not Reportable   08/10/22  07:21    


 


Tear Drop Cells  Not Reportable   08/10/22  07:21    


 


Ovalocytes  Not Reportable   08/10/22  07:21    


 


Helmet Cells  Not Reportable   08/10/22  07:21    


 


[place] Bodies  Not Reportable   08/10/22  07:21    


 


Cabot Rings  Not Reportable   08/10/22  07:21    


 


Angelic Cells  Not Reportable   08/10/22  07:21    


 


Bite Cells  Not Reportable   08/10/22  07:21    


 


Crenated Cell  Not Reportable   08/10/22  07:21    


 


Elliptocytes  Not Reportable   08/10/22  07:21    


 


Acanthocytes (Spur)  Not Reportable   08/10/22  07:21    


 


Rouleaux  Not Reportable   08/10/22  07:21    


 


Hemoglobin C Crystals  Not Reportable   08/10/22  07:21    


 


Schistocytes  Not Reportable   08/10/22  07:21    


 


Malaria parasites  Not Reportable   08/10/22  07:21    


 


Sarabjit Bodies  Not Reportable   08/10/22  07:21    


 


Hem Pathologist Commnt  No   08/10/22  07:21    


 


PT  13.5 Sec. (12.2-14.9)   08/08/22  14:49    


 


INR  0.91  (0.87-1.13)   08/08/22  14:49    


 


APTT  33.8 Sec. (24.2-36.6)   08/08/22  14:49    


 


D-Dimer  1200.26 ng/mlDDU (0-234)  H  08/11/22  06:22    


 


Sodium  141 mmol/L (137-145)   08/11/22  06:22    


 


Potassium  3.9 mmol/L (3.6-5.0)   08/11/22  06:22    


 


Chloride  101.5 mmol/L ()   08/11/22  06:22    


 


Carbon Dioxide  30 mmol/L (22-30)   08/11/22  06:22    


 


Anion Gap  13 mmol/L  08/11/22  06:22    


 


BUN  50 mg/dL (7-17)  H  08/11/22  06:22    


 


Creatinine  3.4 mg/dL (0.6-1.2)  H  08/11/22  06:22    


 


Estimated GFR  14 ml/min  08/11/22  06:22    


 


BUN/Creatinine Ratio  15 %  08/11/22  06:22    


 


Glucose  99 mg/dL ()   08/11/22  06:22    


 


Calcium  8.2 mg/dL (8.4-10.2)  L  08/11/22  06:22    


 


Ferritin  354.3 ng/mL (10.0-200.0)  H  08/11/22  06:22    


 


Total Bilirubin  0.20 mg/dL (0.1-1.2)   08/08/22  14:49    


 


AST  26 units/L (5-40)   08/08/22  14:49    


 


ALT  14 units/L (7-56)   08/08/22  14:49    


 


Alkaline Phosphatase  70 units/L ()   08/08/22  14:49    


 


Lactate Dehydrogenase  211 units/L ()  H  08/11/22  06:22    


 


Troponin T  0.075 ng/mL (0.00-0.029)  H  08/08/22  14:49    


 


C-Reactive Protein  2.10 mg/dL (0.00-1.30)  H  08/11/22  06:22    


 


NT-Pro-B Natriuret Pep  51938 pg/mL (0-900)  H  08/08/22  14:49    


 


Total Protein  5.6 g/dL (6.3-8.2)  L  08/08/22  14:49    


 


Albumin  2.8 g/dL (3.9-5)  L  08/08/22  14:49    


 


Albumin/Globulin Ratio  1.0 %  08/08/22  14:49    


 


Triglycerides  128 mg/dL (2-149)   08/08/22  14:49    


 


Cholesterol  154 mg/dL ()   08/08/22  14:49    


 


LDL Cholesterol Direct  63 mg/dL ()   08/08/22  14:49    


 


HDL Cholesterol  64 mg/dL (40-59)  H  08/08/22  14:49    


 


Cholesterol/HDL Ratio  2.40 %  08/08/22  14:49    


 


Nasal Screen MRSA (PCR)  Negative  (Negative)   08/09/22  02:09    


 


Coronavirus (PCR)  Positive  (Negative)  A  08/10/22  10:00    


 


Hepatitis A IgM Ab  Non-reactive  (NonReactive)   08/09/22  10:27    


 


Hep Bs Antigen  Non-reactive  (Negative)   08/09/22  10:27    


 


Hep B Core IgM Ab  Non-reactive  (NonReactive)   08/09/22  10:27    


 


Hepatitis C Antibody  Non-reactive  (NonReactive)   08/09/22  10:27    











Microbiology: 


Microbiology





08/08/22 16:49   Peripheral/Venous   Blood Culture - Final


                            NO GROWTH AFTER 5 DAYS


08/08/22 16:49   Peripheral/Venous   Blood Culture - Final


                            NO GROWTH AFTER 5 DAYS








Chang/IV: 


                                        





Voiding Method                   External Female Catheter











Active Medications





- Current Medications


Current Medications: 














Generic Name Dose Route Start Last Admin





  Trade Name Freq  PRN Reason Stop Dose Admin


 


Acetaminophen  650 mg  08/08/22 23:21  08/13/22 09:13





  Acetaminophen 325 Mg Tab  PO   650 mg





  Q4H PRN   Administration





  Pain MILD(1-3)/Fever >100.5/HA  


 


Albuterol  2.5 mg  08/08/22 23:21 





  Albuterol 2.5 Mg/3 Ml Nebu  IH  





  Q3HRT PRN  





  Shortness Of Breath  


 


Ascorbic Acid  500 mg  08/09/22 10:00  08/13/22 09:05





  Ascorbic Acid 500 Mg Tab  PO   500 mg





  DAILY BERHANE   Administration


 


Carvedilol  12.5 mg  08/09/22 10:00  08/13/22 22:00





  Carvedilol 12.5 Mg Tab  PO   12.5 mg





  BID BERHANE   Administration


 


Dexamethasone  8 mg  08/09/22 10:00  08/13/22 09:05





  Dexamethasone 4 Mg/Ml Vial  IV  08/18/22 10:01  8 mg





  DAILY BERHANE   Administration


 


Epoetin Diony-epbx  20,000 unit  08/11/22 14:10  08/11/22 17:20





  Epoetin Diony-Epbx 20,000 Unit/1 Ml Vial  IV   20,000 unit





  NASH PRN   Administration





  hemodialysis  


 


Famotidine  20 mg  08/09/22 10:00  08/13/22 09:05





  Famotidine 20 Mg Tab  PO   20 mg





  DAILY BERHANE   Administration


 


Ferrous Sulfate  325 mg  08/09/22 10:00  08/13/22 09:05





  Ferrous Sulfate 325 Mg Tab  PO   325 mg





  DAILY BERHANE   Administration


 


Guaifenesin  200 mg  08/09/22 18:23  08/14/22 06:30





  Guaifenesin 100 Mg/5 Ml Oral Liqd  PO   200 mg





  Q4H PRN   Administration





  Cough  


 


Heparin Sodium (Porcine)  5,000 unit  08/09/22 06:00  08/14/22 06:30





  Heparin 5,000 Unit/1 Ml Vial  SUB-Q   5,000 unit





  Q8HR BERHANE   Administration


 


Sodium Chloride  100 mls @ 999 mls/hr  08/09/22 10:30 





  Nacl 0.9%  IV  





  NASH PRN  





  Hypotension  


 


Levothyroxine Sodium  200 mcg  08/09/22 06:00  08/14/22 06:29





  Levothyroxine 100 Mcg Tab  PO   200 mcg





  QAM@0600 BERHANE   Administration


 


Morphine Sulfate  2 mg  08/08/22 23:21 





  Morphine 2 Mg/1 Ml Inj  IV  





  Q4H PRN  





  Pain, Moderate (4-6)  


 


Morphine Sulfate  4 mg  08/08/22 23:21 





  Morphine 4 Mg/1 Ml Inj  IV  





  Q4H PRN  





  Pain , Severe (7-10)  


 


Nifedipine  60 mg  08/13/22 10:00  08/14/22 06:54





  Nifedipine Xl 60 Mg Tab  PO   Not Given





  Q12HR [facility]  


 


Ondansetron HCl  4 mg  08/08/22 23:21 





  Ondansetron 4 Mg/2 Ml Inj  IV  





  Q8H PRN  





  Nausea And Vomiting  


 


Sevelamer Carbonate  800 mg  08/09/22 08:00  08/13/22 17:02





  Sevelamer Carbonate 800 Mg Tab  PO   800 mg





  TIDWM BERHANE   Administration


 


Sodium Chloride  10 ml  08/09/22 10:00  08/13/22 22:04





  Sodium Chloride 0.9% 10 Ml Flush Syringe  IV   10 ml





  BID BERHANE   Administration


 


Sodium Chloride  10 ml  08/08/22 23:21 





  Sodium Chloride 0.9% 10 Ml Flush Syringe  IV  





  PRN PRN  





  LINE FLUSH  














Nutrition/Malnutrition Assess





- Dietary Evaluation


Nutrition/Malnutrition Findings: 


                                        





Nutrition Notes                                            Start:  08/09/22 

12:04


Freq:                                                      Status: Active       




Protocol:                                                                       




 Document     08/09/22 12:04  [place]  (Rec: 08/09/22 12:32  [place]  [id number])


 Nutrition Notes


     Need for Assessment generated from:         MD Order


     Initial or Follow up                        Assessment


     Current Diagnosis                           CKD (stage V CKD),Hypertension


                                                 ,Respiratory Failure


     Other Pertinent Diagnosis                   ESRD+HD, COVID-19/Pneumonia,


                                                 CHF, Hypothyroidism.


     Current Diet                                Cardiac Diet (since B 08/08),


                                                 Cardiac -Renal- Diet+D Suppl (


                                                 from D 08/09).


     Labs/Tests                                  08/09: BUN 48, Crea 4.0, Glu


                                                 147.


     Pertinent Medications                       08/09: Vit C, FeSO4,


                                                 Levothyroxine, Renvela, others


                                                 nutritionally unremarkable.


     Height                                      5 ft 3 in


     Weight                                      72.575 kg


     Ideal Body Weight (kg)                      52.27


     BMI                                         28.3


     Intake Prior to Admission                   Good


     Weight change and time frame                Pt denies having loss body


                                                 weight PTA.


     Weight Status                               Overweight


     Subjective/Other Information                RD consult for dietary


                                                 supplementation assessment.


                                                 No reports available on Pt's


                                                 PO intake of meals at the time


                                                 , will assess at F/U.


                                                 I will prescribe dietary


                                                 supplements to compensate for


                                                 possible poor or insufficient


                                                 PO intake of meals during LOS.


                                                 I will recommend renal


                                                 restriction to current diet,


                                                 to support Pt's ESRD condition


                                                 during LOS.


                                                 Pt is on High Flow Nasal


                                                 Cannula, O2 saturation @ 91%,


                                                 according to Physical


                                                 Assessment History notes.


                                                 Pt has missing teeth,


                                                 according to Physical


                                                 Assessment History notes.


                                                 Pt is a SNF resident,


                                                 according to Progress notes.


     Percent of energy/protein needs met:        Prescribed Cardiac -Renal-


                                                 Diet provides for energy/


                                                 protein needs (2,230 Kcal/85 g


                                                 ) during LOS; additionally,


                                                 Dietary Supplements will


                                                 compensate for possible poor


                                                 or insufficient PO intake of


                                                 meals with 850 Kcal and 38 g


                                                 of protein.


     Burn                                        Absent


     Trauma                                      Absent


     GI Symptoms                                 None


     Food Allergy                                No


     Skin Integrity/Comment                      Assessment WNL.


     Minimum of two criteria                     No


     Fluid Accumulation                          N/A


     Reduced  Strength                       N/A (non-severe)


     Protein-Calorie Malnutrition                N\A


     #2


      Nutrition Diagnosis                        Altered nutrition-related


                                                 laboratory values


      Etiology                                   CKD V, ESRD.


      As Evidenced by Signs and Symptoms         08/09: BUN 48, Crea 4.0, Glu


                                                 147.


     #1


      Nutrition Diagnosis                        Predicted suboptimal energy


                                                 intake


      Etiology                                   Ongoing and concomitant


                                                 chronic metabolic conditions.


      As Evidenced by Signs and Symptoms         No reports available on Pt's


                                                 PO intake of meals at the time


                                                 , MD request for dietary


                                                 supplements.


     Is patient on ventilator?                   No


     Is Patient Ambulatory and/or Out of Bed     No


     REE-([facility]-confined to bed)      1528.644


     Kcal/Kg value to use for calculation        19


     Approximate Energy Requirements Using       1379


      kcal/Kg                                    


     Calculation Used for Recommendations        Kcal/kg


     Additional Notes                            Protein: >1.2 g/Kg ABW; >88 g/


                                                 day.


                                                 Fluids: 1 ml/Kcal, or as per


                                                 MD.


 Nutrition Intervention


     Change Diet Order:                          Add Renal restriction to


                                                 Cardiac Diet, and continue as


                                                 tolerated.


     Add Supplement/Snack (indicate name/kcal    Start 8 fl oz Nepro w/


      /protein )                                 CARBSTEADY; BID


     Provides kCal:                              850


     Provides Protein (gm)                       38


     Goal #1                                     Compensate, through dietary


                                                 supplementation, for possible


                                                 poor or insufficient PO intake


                                                 of meals during LOS.


     Goal #2                                     Help reach and maintain


                                                 acceptable chemistry lab


                                                 values during LOS.


     Goal #3                                     Adjust the dietary


                                                 intervention to better serve


                                                 Pt's needs and clinical


                                                 conditions during LOS.


     Follow-Up By:                               08/16/22


     Additional Comments                         Continue monitoring food


                                                 tolerance, %PO intake of meals


                                                 , dietary supplements, and BM.

## 2022-08-14 NOTE — PROGRESS NOTE
Assessment and Plan





60 y/o with acute respiratory failure secondary to COVID 19





8/14/22:  Wean FiO2 to off.  Steroids for 10 days.  Prone daily and sleep prone.





8/13/22:  Continue to wean FiO2 to off for sats > 88% as patient is not on 

oxygen at home.  Steroids.  Prone daily and sleep prone at night if possible.  

Will continue to follow.





8/12/22:  HD really hepled with volume removal.  Should get HD again tomorrow.  

Same recs regarding proning.  Patient not on O2 at home so will need walk test 

prior to discharge.





8/11/22:  Continue steroids.  Actemra ordered by ID.  PRone if possible during 

the day and sleep prone at night.  Needs volume removal with HD, based on 

vitals, BP should tolerate it.  Guarded prognosis.





1.  Dexamethasone as ordered


2.  Agree with ID and use of actemra


3. If able to prone, suggest prone as tolerated during the day and sleep prone 

at night


4.  Wean FiO2 and flow for sats >88%


5.  HD per renal, need to keep daily net negative state if possible


6.  Guarded prognosis.





Subjective


Date of service: 08/14/22


Interval history: 





Down to 2 liters





Objective


                               Vital Signs - 12hr











  08/14/22 08/14/22 08/14/22





  05:00 06:28 10:00


 


Temperature  98.1 F 


 


Pulse Rate  74 


 


Respiratory  17 





Rate   


 


Blood Pressure  108/72 





[Left]   


 


O2 Sat by Pulse 97 96 100





Oximetry   











CBC and BMP: 


                                 08/11/22 06:22





                                 08/11/22 06:22


ABG, PT/INR, D-dimer: 


PT/INR, D-dimer











PT  13.5 Sec. (12.2-14.9)   08/08/22  14:49    


 


INR  0.91  (0.87-1.13)   08/08/22  14:49    


 


D-Dimer  1200.26 ng/mlDDU (0-234)  H  08/11/22  06:22    








Abnormal lab findings: 


                                  Abnormal Labs











  08/08/22 08/08/22 08/08/22





  14:49 14:49 14:49


 


RBC  3.08 L  


 


Hgb  9.6 L  


 


Hct   


 


MCV  99 H  


 


RDW  17.7 H  


 


Lymph % (Auto)   


 


Lymph # (Auto)   


 


Seg Neutrophils %   


 


Seg Neuts % (Manual)  78.0 H  


 


Lymphocytes % (Manual)  3.0 L  


 


Lymphocytes # (Manual)  0.3 L  


 


D-Dimer   


 


Sodium   133 L 


 


Carbon Dioxide   21 L 


 


BUN   39 H 


 


Creatinine   3.7 H 


 


Glucose   


 


Calcium   8.2 L 


 


Ferritin   


 


Lactate Dehydrogenase   


 


Troponin T    0.075 H


 


C-Reactive Protein   


 


NT-Pro-B Natriuret Pep    63871 H


 


Total Protein   5.6 L 


 


Albumin   2.8 L 


 


HDL Cholesterol    64 H


 


Coronavirus (PCR)   














  08/09/22 08/09/22 08/09/22





  08:04 08:04 08:04


 


RBC   


 


Hgb   


 


Hct   


 


MCV   


 


RDW   


 


Lymph % (Auto)   


 


Lymph # (Auto)   


 


Seg Neutrophils %   


 


Seg Neuts % (Manual)   


 


Lymphocytes % (Manual)   


 


Lymphocytes # (Manual)   


 


D-Dimer   1044.30 H 


 


Sodium   


 


Carbon Dioxide   


 


BUN  48 H  


 


Creatinine  4.0 H  


 


Glucose  147 H  


 


Calcium   


 


Ferritin    458.0 H


 


Lactate Dehydrogenase   


 


Troponin T   


 


C-Reactive Protein  10.50 H  


 


NT-Pro-B Natriuret Pep   


 


Total Protein   


 


Albumin   


 


HDL Cholesterol   


 


Coronavirus (PCR)   














  08/10/22 08/10/22 08/10/22





  07:21 07:21 10:00


 


RBC  2.83 L  


 


Hgb  8.8 L  


 


Hct  27.9 L  


 


MCV  98 H  


 


RDW  17.2 H  


 


Lymph % (Auto)   


 


Lymph # (Auto)   


 


Seg Neutrophils %   


 


Seg Neuts % (Manual)  93.0 H  


 


Lymphocytes % (Manual)  6.0 L  


 


Lymphocytes # (Manual)  0.5 L  


 


D-Dimer   


 


Sodium   


 


Carbon Dioxide   31 H 


 


BUN   29 H 


 


Creatinine   2.6 H 


 


Glucose   


 


Calcium   8.2 L 


 


Ferritin   


 


Lactate Dehydrogenase   


 


Troponin T   


 


C-Reactive Protein   


 


NT-Pro-B Natriuret Pep   


 


Total Protein   


 


Albumin   


 


HDL Cholesterol   


 


Coronavirus (PCR)    Positive A














  08/11/22 08/11/22 08/11/22





  06:22 06:22 06:22


 


RBC  2.65 L  


 


Hgb  8.2 L  


 


Hct  25.9 L  


 


MCV  98 H  


 


RDW  16.7 H  


 


Lymph % (Auto)  5.9 L  


 


Lymph # (Auto)  0.3 L  


 


Seg Neutrophils %  89.9 H  


 


Seg Neuts % (Manual)   


 


Lymphocytes % (Manual)   


 


Lymphocytes # (Manual)   


 


D-Dimer   1200.26 H 


 


Sodium   


 


Carbon Dioxide   


 


BUN    50 H


 


Creatinine    3.4 H


 


Glucose   


 


Calcium    8.2 L


 


Ferritin   


 


Lactate Dehydrogenase    211 H


 


Troponin T   


 


C-Reactive Protein    2.10 H


 


NT-Pro-B Natriuret Pep   


 


Total Protein   


 


Albumin   


 


HDL Cholesterol   


 


Coronavirus (PCR)   














  08/11/22





  06:22


 


RBC 


 


Hgb 


 


Hct 


 


MCV 


 


RDW 


 


Lymph % (Auto) 


 


Lymph # (Auto) 


 


Seg Neutrophils % 


 


Seg Neuts % (Manual) 


 


Lymphocytes % (Manual) 


 


Lymphocytes # (Manual) 


 


D-Dimer 


 


Sodium 


 


Carbon Dioxide 


 


BUN 


 


Creatinine 


 


Glucose 


 


Calcium 


 


Ferritin  354.3 H


 


Lactate Dehydrogenase 


 


Troponin T 


 


C-Reactive Protein 


 


NT-Pro-B Natriuret Pep 


 


Total Protein 


 


Albumin 


 


HDL Cholesterol 


 


Coronavirus (PCR)

## 2022-08-14 NOTE — PROGRESS NOTE
Assessment and Plan





This is a 61 year old woman who presents with dyspnea, COVID-19





# ESRD: continue HD Tu/Th/Sa or prn, last HD yesterday


- daily labs


- renally dose meds


- avoid nephrotoxins


- renal diet


- verbal consent obtained for HD





# Anemia: last hemoglobin 9.6->8.8->8.2, restarted ESAs with HD TIW  





# HTN: UF as tolerated, aim net negative as able.  BP stable





# Secondary Hyperparathyroidism: continue home binders as needed, vitamin D 

analogs prn





# COVID-19 Pneumonia, Respiratory Failure: improving, pulmonary/ID also 

following





# CHF: note BNP 13K on admission, UF as tolerated





Subjective


Date of service: 08/14/22


Interval history: 





Chart, labs, vitals reviewed.  Seen in room, resting on NC





Objective





- Exam


Narrative Exam: 





No direct examination today due to COVID-19, need to limit PPE





- Vital Signs


Vital signs: 


                               Vital Signs - 12hr











  08/14/22 08/14/22 08/14/22





  05:00 06:28 10:00


 


Temperature  98.1 F 


 


Pulse Rate  74 


 


Respiratory  17 





Rate   


 


Blood Pressure  108/72 





[Left]   


 


O2 Sat by Pulse 97 96 100





Oximetry   














- Lab





                                 08/11/22 06:22





                                 08/11/22 06:22


                             Most recent lab results











Calcium  8.2 mg/dL (8.4-10.2)  L  08/11/22  06:22    














Medications & Allergies





- Medications


Allergies/Adverse Reactions: 


                                    Allergies





NSAIDS (Non-Steroidal Anti-Inflamma Allergy (Verified 08/08/22 13:30)


   Rash


Sulfa (Sulfonamide Antibiotics) Allergy (Verified 08/08/22 13:30)


   Rash








Home Medications: 


                                Home Medications











 Medication  Instructions  Recorded  Confirmed  Last Taken  Type


 


Ascorbic Acid [Vitamin C] 500 mg PO DAILY 08/08/22 08/08/22 Unknown History


 


Ferrous Sulfate [Ferrous Sulfate 324 mg PO DAILY 08/08/22 08/08/22 Unknown 

History





324 MG]     


 


Levothyroxine Sodium 200 mcg PO DAILY 08/08/22 08/08/22 Unknown History





[Levothyroxine]     


 


Venlafaxine [Effexor 37.5mg tab] 37.5 mg PO QDAY 08/08/22 08/08/22 Unknown H

istory


 


carvediloL [Coreg] 12.5 mg PO BID 08/08/22 08/08/22 Unknown History


 


sevelamer HCL [Sevelamer HCl] 800 mg PO TID 08/08/22 08/08/22 Unknown History











Active Medications: 














Generic Name Dose Route Start Last Admin





  Trade Name Freq  PRN Reason Stop Dose Admin


 


Acetaminophen  650 mg  08/08/22 23:21  08/14/22 09:51





  Acetaminophen 325 Mg Tab  PO   650 mg





  Q4H PRN   Administration





  Pain MILD(1-3)/Fever >100.5/HA  


 


Albuterol  2.5 mg  08/08/22 23:21 





  Albuterol 2.5 Mg/3 Ml Nebu  IH  





  Q3HRT PRN  





  Shortness Of Breath  


 


Ascorbic Acid  500 mg  08/09/22 10:00  08/14/22 09:38





  Ascorbic Acid 500 Mg Tab  PO   500 mg





  DAILY BERHANE   Administration


 


Carvedilol  12.5 mg  08/09/22 10:00  08/14/22 09:38





  Carvedilol 12.5 Mg Tab  PO   12.5 mg





  BID BERHANE   Administration


 


Dexamethasone  8 mg  08/09/22 10:00  08/14/22 09:38





  Dexamethasone 4 Mg/Ml Vial  IV  08/18/22 10:01  8 mg





  DAILY BERHANE   Administration


 


Epoetin Diony-epbx  20,000 unit  08/11/22 14:10  08/11/22 17:20





  Epoetin Diony-Epbx 20,000 Unit/1 Ml Vial  IV   20,000 unit





  NASH PRN   Administration





  hemodialysis  


 


Famotidine  20 mg  08/09/22 10:00  08/14/22 09:38





  Famotidine 20 Mg Tab  PO   20 mg





  DAILY BERHANE   Administration


 


Ferrous Sulfate  325 mg  08/09/22 10:00  08/14/22 09:39





  Ferrous Sulfate 325 Mg Tab  PO   325 mg





  DAILY BERHANE   Administration


 


Guaifenesin  200 mg  08/09/22 18:23  08/14/22 06:30





  Guaifenesin 100 Mg/5 Ml Oral Liqd  PO   200 mg





  Q4H PRN   Administration





  Cough  


 


Heparin Sodium (Porcine)  5,000 unit  08/09/22 06:00  08/14/22 15:19





  Heparin 5,000 Unit/1 Ml Vial  SUB-Q   5,000 unit





  Q8HR BERHANE   Administration


 


Sodium Chloride  100 mls @ 999 mls/hr  08/09/22 10:30 





  Nacl 0.9%  IV  





  NASH PRN  





  Hypotension  


 


Levothyroxine Sodium  200 mcg  08/09/22 06:00  08/14/22 06:29





  Levothyroxine 100 Mcg Tab  PO   200 mcg





  QAM@0600 BERHANE   Administration


 


Morphine Sulfate  2 mg  08/08/22 23:21 





  Morphine 2 Mg/1 Ml Inj  IV  





  Q4H PRN  





  Pain, Moderate (4-6)  


 


Morphine Sulfate  4 mg  08/08/22 23:21 





  Morphine 4 Mg/1 Ml Inj  IV  





  Q4H PRN  





  Pain , Severe (7-10)  


 


Nifedipine  60 mg  08/13/22 10:00  08/14/22 09:38





  Nifedipine Xl 60 Mg Tab  PO   60 mg





  Q12HR BERHANE   Administration


 


Ondansetron HCl  4 mg  08/08/22 23:21 





  Ondansetron 4 Mg/2 Ml Inj  IV  





  Q8H PRN  





  Nausea And Vomiting  


 


Sevelamer Carbonate  800 mg  08/09/22 08:00  08/14/22 12:57





  Sevelamer Carbonate 800 Mg Tab  PO   800 mg





  TIDWM BERHANE   Administration


 


Sodium Chloride  10 ml  08/09/22 10:00  08/14/22 09:38





  Sodium Chloride 0.9% 10 Ml Flush Syringe  IV   10 ml





  BID BERHANE   Administration


 


Sodium Chloride  10 ml  08/08/22 23:21 





  Sodium Chloride 0.9% 10 Ml Flush Syringe  IV  





  PRN PRN  





  LINE FLUSH

## 2022-08-14 NOTE — PROGRESS NOTE
Assessment and Plan


Assessment and plan: 








#COVID-19 pneumonia


#Acute hypoxic respiratory failure-improving


- etiology: Secondary to COVID-19 pneumonia + possible acute heart failure


- baseline oxygen requirements: Room air


- supplemental oxygen: 3L nasal cannula


- Continue protocol: continue pulse oximetry, wean oxygen as tolerated, 

dexamethasone 8 mg daily x10 days, azithromycin 250 mg daily x5 days (completes 

on 8/12/2022), airborne and droplet precautions


-Infectious disease consulted; appreciate recs.  Patient not currently candidate

for remdesivir given renal function.


 Pulmonology consulted; appreciate recs


- continue to monitor





#Possible acute heart failure-ruled out


- Continue CHF exacerbation protocol: Telemetry, Strict I/O, monitor urine 

output every shift, daily weights, afterload reduction, low-sodium diet, and 

fluid restriction of approximately 1.5 mL/day, IV Lasix 40 mg twice daily


- Supplemental oxygen: High flow nasal cannula 10 L / 70% FiO2


- ProBNP on admission: 13,672


- TTE (8/8/2022) revealing EF 55-60%, normal-sized LV, normal LV systolic 

function, mild concentric LVH with mild diastolic dysfunction, and RVSP is 41 

mmHg.


- Continue to monitor





#Elevated D-dimer


 D-dimer 1044


 Unremarkable bilateral venous Dopplers to evaluate for possible DVT.  Continue

to monitor.





#ESRD on hemodialysis


-Access: Permacath in right upper chest


-Outpatient schedule: Unknown


-HD center: N/A


-Nephrology consulted; appreciate recs. 


-Renally dose medications and avoid nephrotoxic drugs. Renal diet.





#Macrocytic anemia


 Hemoglobin 8.8


 Transfuse if hemoglobin <7 or patient becomes symptomatic.  Continue to 

monitor.





#Hypothyroidism


 Continue home levothyroxine 200 mcg daily





#Mild protein caloric malnutrition


 Albumin 2.5


Continue dietary supplementation





#Advanced care planning


-Disease education conducted, care plan discussed, diagnoses discussed, 

prognosis discussed, and patient acknowledges understanding with care plan


-Time: +30 min





#Discharge planning


- Patient is pending further respiratory improvement.


- Case management has been made aware. 


- Discharge is tentatively 24-48 hours


Disposition Plan: Continue medical management


Total Time Spent with Patient (Minutes): 45 min 





History


Interval history: 





No acute events overnight. 





Hospitalist Physical





- Constitutional


Vitals: 


                                        











Temp Pulse Resp BP Pulse Ox


 


 98.1 F   74   17   108/72   96 


 


 08/14/22 06:28  08/14/22 06:28  08/14/22 06:28  08/14/22 06:28  08/14/22 06:28











General appearance: Present: no acute distress, well-nourished





- EENT


Eyes: Present: PERRL, EOM intact


ENT: hearing intact, clear oral mucosa, dentition normal





- Neck


Neck: Present: supple, normal ROM, other (permacath in upper R chest)





- Respiratory


Respiratory effort: normal


Respiratory: bilateral: diminished (on 3L nasal cannula)





- Cardiovascular


Rhythm: regular


Heart Sounds: Present: S1 & S2





- Extremities


Extremities: no ischemia, pulses intact, pulses symmetrical, No edema, normal 

temperature, normal color


Peripheral Pulses: within normal limits





- Abdominal


General gastrointestinal: soft, non-tender, non-distended, normal bowel sounds





- Integumentary


Integumentary: Present: clear, warm, dry





- Psychiatric


Psychiatric: appropriate mood/affect, intact judgment & insight, memory intact, 

cooperative





- Neurologic


Neurologic: CNII-XII intact, moves all extremities





- Allied Health


Allied health notes reviewed: nursing





HEART Score





- HEART Score


Troponin: 


                                        











Troponin T  0.075 ng/mL (0.00-0.029)  H  08/08/22  14:49    














Results





- Labs


CBC & Chem 7: 


                                 08/11/22 06:22





                                 08/11/22 06:22


Labs: 


                             Laboratory Last Values











WBC  5.3 K/mm3 (4.5-11.0)   08/11/22  06:22    


 


RBC  2.65 M/mm3 (3.65-5.03)  L  08/11/22  06:22    


 


Hgb  8.2 gm/dl (10.1-14.3)  L  08/11/22  06:22    


 


Hct  25.9 % (30.3-42.9)  L  08/11/22  06:22    


 


MCV  98 fl (79-97)  H  08/11/22  06:22    


 


MCH  31 pg (28-32)   08/11/22  06:22    


 


MCHC  32 % (30-34)   08/11/22  06:22    


 


RDW  16.7 % (13.2-15.2)  H  08/11/22  06:22    


 


Plt Count  172 K/mm3 (140-440)   08/11/22  06:22    


 


Lymph % (Auto)  5.9 % (13.4-35.0)  L  08/11/22  06:22    


 


Mono % (Auto)  4.1 % (0.0-7.3)   08/11/22  06:22    


 


Eos % (Auto)  0.0 % (0.0-4.3)   08/11/22  06:22    


 


Baso % (Auto)  0.1 % (0.0-1.8)   08/11/22  06:22    


 


Lymph # (Auto)  0.3 K/mm3 (1.2-5.4)  L  08/11/22  06:22    


 


Mono # (Auto)  0.2 K/mm3 (0.0-0.8)   08/11/22  06:22    


 


Eos # (Auto)  0.0 K/mm3 (0.0-0.4)   08/11/22  06:22    


 


Baso # (Auto)  0.0 K/mm3 (0.0-0.1)   08/11/22  06:22    


 


Add Manual Diff  Complete   08/10/22  07:21    


 


Total Counted  100   08/10/22  07:21    


 


Seg Neutrophils %  89.9 % (40.0-70.0)  H  08/11/22  06:22    


 


Seg Neuts % (Manual)  93.0 % (40.0-70.0)  H  08/10/22  07:21    


 


Band Neutrophils %  0 %  08/10/22  07:21    


 


Lymphocytes % (Manual)  6.0 % (13.4-35.0)  L  08/10/22  07:21    


 


Reactive Lymphs % (Man)  0 %  08/10/22  07:21    


 


Monocytes % (Manual)  0 % (0.0-7.3)   08/10/22  07:21    


 


Eosinophils % (Manual)  0 % (0.0-4.3)   08/10/22  07:21    


 


Basophils % (Manual)  0 % (0.0-1.8)   08/10/22  07:21    


 


Metamyelocytes %  0 %  08/10/22  07:21    


 


Myelocytes %  1.0 %  08/10/22  07:21    


 


Promyelocytes %  0 %  08/10/22  07:21    


 


Blast Cells %  0 %  08/10/22  07:21    


 


Nucleated RBC %  Not Reportable   08/10/22  07:21    


 


Seg Neutrophils #  4.8 K/mm3 (1.8-7.7)   08/11/22  06:22    


 


Seg Neutrophils # Man  7.4 K/mm3 (1.8-7.7)   08/10/22  07:21    


 


Band Neutrophils #  0.0 K/mm3  08/10/22  07:21    


 


Lymphocytes # (Manual)  0.5 K/mm3 (1.2-5.4)  L  08/10/22  07:21    


 


Abs React Lymphs (Man)  0.0 K/mm3  08/10/22  07:21    


 


Monocytes # (Manual)  0.0 K/mm3 (0.0-0.8)   08/10/22  07:21    


 


Eosinophils # (Manual)  0.0 K/mm3 (0.0-0.4)   08/10/22  07:21    


 


Basophils # (Manual)  0.0 K/mm3 (0.0-0.1)   08/10/22  07:21    


 


Metamyelocytes #  0.0 K/mm3  08/10/22  07:21    


 


Myelocytes #  0.1 K/mm3  08/10/22  07:21    


 


Promyelocytes #  0.0 K/mm3  08/10/22  07:21    


 


Blast Cells #  0.0 K/mm3  08/10/22  07:21    


 


WBC Morphology  Not Reportable   08/10/22  07:21    


 


Hypersegmented Neuts  Not Reportable   08/10/22  07:21    


 


Hyposegmented Neuts  Not Reportable   08/10/22  07:21    


 


Hypogranular Neuts  Not Reportable   08/10/22  07:21    


 


Smudge Cells  Not Reportable   08/10/22  07:21    


 


Toxic Granulation  Not Reportable   08/10/22  07:21    


 


Toxic Vacuolation  Not Reportable   08/10/22  07:21    


 


Dohle Bodies  Not Reportable   08/10/22  07:21    


 


Pelger-Huet Anomaly  Not Reportable   08/10/22  07:21    


 


Komal Rods  Not Reportable   08/10/22  07:21    


 


Platelet Estimate  Consistent w auto   08/10/22  07:21    


 


Clumped Platelets  Not Reportable   08/10/22  07:21    


 


Plt Clumps, EDTA  Not Reportable   08/10/22  07:21    


 


Large Platelets  Not Reportable   08/10/22  07:21    


 


Giant Platelets  Not Reportable   08/10/22  07:21    


 


Platelet Satelliting  Not Reportable   08/10/22  07:21    


 


Plt Morphology Comment  Not Reportable   08/10/22  07:21    


 


RBC Morphology  Not Reportable   08/10/22  07:21    


 


Dimorphic RBCs  Not Reportable   08/10/22  07:21    


 


Polychromasia  Not Reportable   08/10/22  07:21    


 


Hypochromasia  Not Reportable   08/10/22  07:21    


 


Poikilocytosis  Not Reportable   08/10/22  07:21    


 


Anisocytosis  1+   08/10/22  07:21    


 


Microcytosis  Not Reportable   08/10/22  07:21    


 


Macrocytosis  Not Reportable   08/10/22  07:21    


 


Spherocytes  Not Reportable   08/10/22  07:21    


 


Pappenheimer Bodies  Not Reportable   08/10/22  07:21    


 


Sickle Cells  Not Reportable   08/10/22  07:21    


 


Target Cells  Not Reportable   08/10/22  07:21    


 


Tear Drop Cells  Not Reportable   08/10/22  07:21    


 


Ovalocytes  Not Reportable   08/10/22  07:21    


 


Helmet Cells  Not Reportable   08/10/22  07:21    


 


[place] Bodies  Not Reportable   08/10/22  07:21    


 


Cabot Rings  Not Reportable   08/10/22  07:21    


 


Angelic Cells  Not Reportable   08/10/22  07:21    


 


Bite Cells  Not Reportable   08/10/22  07:21    


 


Crenated Cell  Not Reportable   08/10/22  07:21    


 


Elliptocytes  Not Reportable   08/10/22  07:21    


 


Acanthocytes (Spur)  Not Reportable   08/10/22  07:21    


 


Rouleaux  Not Reportable   08/10/22  07:21    


 


Hemoglobin C Crystals  Not Reportable   08/10/22  07:21    


 


Schistocytes  Not Reportable   08/10/22  07:21    


 


Malaria parasites  Not Reportable   08/10/22  07:21    


 


Sarabjit Bodies  Not Reportable   08/10/22  07:21    


 


Hem Pathologist Commnt  No   08/10/22  07:21    


 


PT  13.5 Sec. (12.2-14.9)   08/08/22  14:49    


 


INR  0.91  (0.87-1.13)   08/08/22  14:49    


 


APTT  33.8 Sec. (24.2-36.6)   08/08/22  14:49    


 


D-Dimer  1200.26 ng/mlDDU (0-234)  H  08/11/22  06:22    


 


Sodium  141 mmol/L (137-145)   08/11/22  06:22    


 


Potassium  3.9 mmol/L (3.6-5.0)   08/11/22  06:22    


 


Chloride  101.5 mmol/L ()   08/11/22  06:22    


 


Carbon Dioxide  30 mmol/L (22-30)   08/11/22  06:22    


 


Anion Gap  13 mmol/L  08/11/22  06:22    


 


BUN  50 mg/dL (7-17)  H  08/11/22  06:22    


 


Creatinine  3.4 mg/dL (0.6-1.2)  H  08/11/22  06:22    


 


Estimated GFR  14 ml/min  08/11/22  06:22    


 


BUN/Creatinine Ratio  15 %  08/11/22  06:22    


 


Glucose  99 mg/dL ()   08/11/22  06:22    


 


Calcium  8.2 mg/dL (8.4-10.2)  L  08/11/22  06:22    


 


Ferritin  354.3 ng/mL (10.0-200.0)  H  08/11/22  06:22    


 


Total Bilirubin  0.20 mg/dL (0.1-1.2)   08/08/22  14:49    


 


AST  26 units/L (5-40)   08/08/22  14:49    


 


ALT  14 units/L (7-56)   08/08/22  14:49    


 


Alkaline Phosphatase  70 units/L ()   08/08/22  14:49    


 


Lactate Dehydrogenase  211 units/L ()  H  08/11/22  06:22    


 


Troponin T  0.075 ng/mL (0.00-0.029)  H  08/08/22  14:49    


 


C-Reactive Protein  2.10 mg/dL (0.00-1.30)  H  08/11/22  06:22    


 


NT-Pro-B Natriuret Pep  61148 pg/mL (0-900)  H  08/08/22  14:49    


 


Total Protein  5.6 g/dL (6.3-8.2)  L  08/08/22  14:49    


 


Albumin  2.8 g/dL (3.9-5)  L  08/08/22  14:49    


 


Albumin/Globulin Ratio  1.0 %  08/08/22  14:49    


 


Triglycerides  128 mg/dL (2-149)   08/08/22  14:49    


 


Cholesterol  154 mg/dL ()   08/08/22  14:49    


 


LDL Cholesterol Direct  63 mg/dL ()   08/08/22  14:49    


 


HDL Cholesterol  64 mg/dL (40-59)  H  08/08/22  14:49    


 


Cholesterol/HDL Ratio  2.40 %  08/08/22  14:49    


 


Nasal Screen MRSA (PCR)  Negative  (Negative)   08/09/22  02:09    


 


Coronavirus (PCR)  Positive  (Negative)  A  08/10/22  10:00    


 


Hepatitis A IgM Ab  Non-reactive  (NonReactive)   08/09/22  10:27    


 


Hep Bs Antigen  Non-reactive  (Negative)   08/09/22  10:27    


 


Hep B Core IgM Ab  Non-reactive  (NonReactive)   08/09/22  10:27    


 


Hepatitis C Antibody  Non-reactive  (NonReactive)   08/09/22  10:27    











Microbiology: 


Microbiology





08/08/22 16:49   Peripheral/Venous   Blood Culture - Final


                            NO GROWTH AFTER 5 DAYS


08/08/22 16:49   Peripheral/Venous   Blood Culture - Final


                            NO GROWTH AFTER 5 DAYS








Chang/IV: 


                                        





Voiding Method                   External Female Catheter











Active Medications





- Current Medications


Current Medications: 














Generic Name Dose Route Start Last Admin





  Trade Name Freq  PRN Reason Stop Dose Admin


 


Acetaminophen  650 mg  08/08/22 23:21  08/13/22 09:13





  Acetaminophen 325 Mg Tab  PO   650 mg





  Q4H PRN   Administration





  Pain MILD(1-3)/Fever >100.5/HA  


 


Albuterol  2.5 mg  08/08/22 23:21 





  Albuterol 2.5 Mg/3 Ml Nebu  IH  





  Q3HRT PRN  





  Shortness Of Breath  


 


Ascorbic Acid  500 mg  08/09/22 10:00  08/13/22 09:05





  Ascorbic Acid 500 Mg Tab  PO   500 mg





  DAILY BERHANE   Administration


 


Carvedilol  12.5 mg  08/09/22 10:00  08/13/22 22:00





  Carvedilol 12.5 Mg Tab  PO   12.5 mg





  BID BERHANE   Administration


 


Dexamethasone  8 mg  08/09/22 10:00  08/13/22 09:05





  Dexamethasone 4 Mg/Ml Vial  IV  08/18/22 10:01  8 mg





  DAILY BERHANE   Administration


 


Epoetin Diony-epbx  20,000 unit  08/11/22 14:10  08/11/22 17:20





  Epoetin Diony-Epbx 20,000 Unit/1 Ml Vial  IV   20,000 unit





  NASH PRN   Administration





  hemodialysis  


 


Famotidine  20 mg  08/09/22 10:00  08/13/22 09:05





  Famotidine 20 Mg Tab  PO   20 mg





  DAILY BERHANE   Administration


 


Ferrous Sulfate  325 mg  08/09/22 10:00  08/13/22 09:05





  Ferrous Sulfate 325 Mg Tab  PO   325 mg





  DAILY BERHANE   Administration


 


Guaifenesin  200 mg  08/09/22 18:23  08/14/22 06:30





  Guaifenesin 100 Mg/5 Ml Oral Liqd  PO   200 mg





  Q4H PRN   Administration





  Cough  


 


Heparin Sodium (Porcine)  5,000 unit  08/09/22 06:00  08/14/22 06:30





  Heparin 5,000 Unit/1 Ml Vial  SUB-Q   5,000 unit





  Q8HR BERHANE   Administration


 


Sodium Chloride  100 mls @ 999 mls/hr  08/09/22 10:30 





  Nacl 0.9%  IV  





  NASH PRN  





  Hypotension  


 


Levothyroxine Sodium  200 mcg  08/09/22 06:00  08/14/22 06:29





  Levothyroxine 100 Mcg Tab  PO   200 mcg





  QAM@0600 BERHANE   Administration


 


Morphine Sulfate  2 mg  08/08/22 23:21 





  Morphine 2 Mg/1 Ml Inj  IV  





  Q4H PRN  





  Pain, Moderate (4-6)  


 


Morphine Sulfate  4 mg  08/08/22 23:21 





  Morphine 4 Mg/1 Ml Inj  IV  





  Q4H PRN  





  Pain , Severe (7-10)  


 


Nifedipine  60 mg  08/13/22 10:00  08/14/22 06:54





  Nifedipine Xl 60 Mg Tab  PO   Not Given





  Q12HR [facility]  


 


Ondansetron HCl  4 mg  08/08/22 23:21 





  Ondansetron 4 Mg/2 Ml Inj  IV  





  Q8H PRN  





  Nausea And Vomiting  


 


Sevelamer Carbonate  800 mg  08/09/22 08:00  08/13/22 17:02





  Sevelamer Carbonate 800 Mg Tab  PO   800 mg





  TIDWM BERHANE   Administration


 


Sodium Chloride  10 ml  08/09/22 10:00  08/13/22 22:04





  Sodium Chloride 0.9% 10 Ml Flush Syringe  IV   10 ml





  BID BERHANE   Administration


 


Sodium Chloride  10 ml  08/08/22 23:21 





  Sodium Chloride 0.9% 10 Ml Flush Syringe  IV  





  PRN PRN  





  LINE FLUSH  














Nutrition/Malnutrition Assess





- Dietary Evaluation


Nutrition/Malnutrition Findings: 


                                        





Nutrition Notes                                            Start:  08/09/22 

12:04


Freq:                                                      Status: Active       




Protocol:                                                                       




 Document     08/09/22 12:04  [place]  (Rec: 08/09/22 12:32  A[place]IN  [id number])


 Nutrition Notes


     Need for Assessment generated from:         MD Order


     Initial or Follow up                        Assessment


     Current Diagnosis                           CKD (stage V CKD),Hypertension


                                                 ,Respiratory Failure


     Other Pertinent Diagnosis                   ESRD+HD, COVID-19/Pneumonia,


                                                 CHF, Hypothyroidism.


     Current Diet                                Cardiac Diet (since B 08/08),


                                                 Cardiac -Renal- Diet+D Suppl (


                                                 from D 08/09).


     Labs/Tests                                  08/09: BUN 48, Crea 4.0, Glu


                                                 147.


     Pertinent Medications                       08/09: Vit C, FeSO4,


                                                 Levothyroxine, Renvela, others


                                                 nutritionally unremarkable.


     Height                                      5 ft 3 in


     Weight                                      72.575 kg


     Ideal Body Weight (kg)                      52.27


     BMI                                         28.3


     Intake Prior to Admission                   Good


     Weight change and time frame                Pt denies having loss body


                                                 weight PTA.


     Weight Status                               Overweight


     Subjective/Other Information                RD consult for dietary


                                                 supplementation assessment.


                                                 No reports available on Pt's


                                                 PO intake of meals at the time


                                                 , will assess at F/U.


                                                 I will prescribe dietary


                                                 supplements to compensate for


                                                 possible poor or insufficient


                                                 PO intake of meals during LOS.


                                                 I will recommend renal


                                                 restriction to current diet,


                                                 to support Pt's ESRD condition


                                                 during LOS.


                                                 Pt is on High Flow Nasal


                                                 Cannula, O2 saturation @ 91%,


                                                 according to Physical


                                                 Assessment History notes.


                                                 Pt has missing teeth,


                                                 according to Physical


                                                 Assessment History notes.


                                                 Pt is a SNF resident,


                                                 according to Progress notes.


     Percent of energy/protein needs met:        Prescribed Cardiac -Renal-


                                                 Diet provides for energy/


                                                 protein needs (2,230 Kcal/85 g


                                                 ) during LOS; additionally,


                                                 Dietary Supplements will


                                                 compensate for possible poor


                                                 or insufficient PO intake of


                                                 meals with 850 Kcal and 38 g


                                                 of protein.


     Burn                                        Absent


     Trauma                                      Absent


     GI Symptoms                                 None


     Food Allergy                                No


     Skin Integrity/Comment                      Assessment WNL.


     Minimum of two criteria                     No


     Fluid Accumulation                          N/A


     Reduced  Strength                       N/A (non-severe)


     Protein-Calorie Malnutrition                N\A


     #2


      Nutrition Diagnosis                        Altered nutrition-related


                                                 laboratory values


      Etiology                                   CKD V, ESRD.


      As Evidenced by Signs and Symptoms         08/09: BUN 48, Crea 4.0, Glu


                                                 147.


     #1


      Nutrition Diagnosis                        Predicted suboptimal energy


                                                 intake


      Etiology                                   Ongoing and concomitant


                                                 chronic metabolic conditions.


      As Evidenced by Signs and Symptoms         No reports available on Pt's


                                                 PO intake of meals at the time


                                                 , MD request for dietary


                                                 supplements.


     Is patient on ventilator?                   No


     Is Patient Ambulatory and/or Out of Bed     No


     REE-([facility]-confined to bed)      1528.644


     Kcal/Kg value to use for calculation        19


     Approximate Energy Requirements Using       1379


      kcal/Kg                                    


     Calculation Used for Recommendations        Kcal/kg


     Additional Notes                            Protein: >1.2 g/Kg ABW; >88 g/


                                                 day.


                                                 Fluids: 1 ml/Kcal, or as per


                                                 MD.


 Nutrition Intervention


     Change Diet Order:                          Add Renal restriction to


                                                 Cardiac Diet, and continue as


                                                 tolerated.


     Add Supplement/Snack (indicate name/kcal    Start 8 fl oz Nepro w/


      /protein )                                 CARBSTEADY; BID


     Provides kCal:                              850


     Provides Protein (gm)                       38


     Goal #1                                     Compensate, through dietary


                                                 supplementation, for possible


                                                 poor or insufficient PO intake


                                                 of meals during LOS.


     Goal #2                                     Help reach and maintain


                                                 acceptable chemistry lab


                                                 values during LOS.


     Goal #3                                     Adjust the dietary


                                                 intervention to better serve


                                                 Pt's needs and clinical


                                                 conditions during LOS.


     Follow-Up By:                               08/16/22


     Additional Comments                         Continue monitoring food


                                                 tolerance, %PO intake of meals


                                                 , dietary supplements, and BM.

## 2022-08-15 RX ADMIN — DEXAMETHASONE SCH MG: 4 TABLET ORAL at 11:39

## 2022-08-15 RX ADMIN — OXYCODONE HYDROCHLORIDE AND ACETAMINOPHEN SCH MG: 500 TABLET ORAL at 10:38

## 2022-08-15 RX ADMIN — SEVELAMER CARBONATE SCH MG: 800 TABLET, FILM COATED ORAL at 17:37

## 2022-08-15 RX ADMIN — LEVOTHYROXINE SODIUM SCH MCG: 0.1 TABLET ORAL at 06:11

## 2022-08-15 RX ADMIN — ACETAMINOPHEN PRN MG: 325 TABLET ORAL at 17:44

## 2022-08-15 RX ADMIN — Medication SCH ML: at 21:27

## 2022-08-15 RX ADMIN — FAMOTIDINE SCH MG: 20 TABLET ORAL at 10:39

## 2022-08-15 RX ADMIN — HEPARIN SODIUM SCH UNIT: 5000 INJECTION, SOLUTION INTRAVENOUS; SUBCUTANEOUS at 21:27

## 2022-08-15 RX ADMIN — NIFEDIPINE SCH MG: 60 TABLET, EXTENDED RELEASE ORAL at 21:27

## 2022-08-15 RX ADMIN — SEVELAMER CARBONATE SCH MG: 800 TABLET, FILM COATED ORAL at 09:36

## 2022-08-15 RX ADMIN — SEVELAMER CARBONATE SCH MG: 800 TABLET, FILM COATED ORAL at 12:37

## 2022-08-15 RX ADMIN — FERROUS SULFATE TAB 325 MG (65 MG ELEMENTAL FE) SCH MG: 325 (65 FE) TAB at 11:38

## 2022-08-15 RX ADMIN — GUAIFENESIN PRN MG: 100 SOLUTION ORAL at 06:11

## 2022-08-15 RX ADMIN — NIFEDIPINE SCH MG: 60 TABLET, EXTENDED RELEASE ORAL at 10:38

## 2022-08-15 RX ADMIN — Medication SCH ML: at 17:40

## 2022-08-15 RX ADMIN — HEPARIN SODIUM SCH UNIT: 5000 INJECTION, SOLUTION INTRAVENOUS; SUBCUTANEOUS at 14:10

## 2022-08-15 RX ADMIN — HEPARIN SODIUM SCH UNIT: 5000 INJECTION, SOLUTION INTRAVENOUS; SUBCUTANEOUS at 06:11

## 2022-08-15 NOTE — PROGRESS NOTE
Assessment and Plan





Cultures:


SARS CoV2 PCR: Positive 


8/8/2022 blood culture: no growth 





A/P:


61-year-old female with ESRD on HD, hypertension, hypothyroidism, nursing home 

resident was brought into the emergency room due to shortness of breath:





#Bilateral pneumonia: Secondary to COVID-19. High CRP, requiring HFNC. Not a 

candidate for remdesivir. Got Actemra on 8/10/2022 along with steroids x 10 

days. 





#Acute hypoxic respiratory failure: Requiring HFNC, improving. 





#ESRD on HD: Renally adjust antibiotics





#CHF





Recs:


Complete remainder of steroid course


Oxygen weaning as tolerated





ID will sign off.  Please call with questions





Sneha Thompson MD, [facility]


O: [phone]


F: [phone]


C: [phone]





Subjective


Date of service: 08/15/22


Interval history: 





No fever.  Oxygen has been weaned to 2 L by nasal cannula.





Objective





- Exam


Narrative Exam: 





Physical Exam (reviewed in chart to minimize risk of transmission)


Constitutional: deferred


Head, Ears, Nose: deferred


Eyes: deferred


Neck: deferred


Oral: deferred


Cardiovascular: deferred


Respiratory: deferred


GI: deferred


Musculoskeletal: deferred


Skin: deferred


Hem/Lymphatic: deferred


Psych: deferred


Neurological: deferred





- Constitutional


Vitals: 


                                   Vital Signs











Temp Pulse Resp BP Pulse Ox


 


 97.5 F L  74   16   131/77   96 


 


 08/15/22 04:49  08/15/22 04:49  08/15/22 04:49  08/15/22 04:49  08/15/22 05:00








                           Temperature -Last 24 Hours











Temperature                    97.5 F


 


Temperature                    99.4 F


 


Temperature                    98.3 F

















- Labs


CBC & Chem 7: 


                                 08/11/22 06:22





                                 08/11/22 06:22

## 2022-08-15 NOTE — DISCHARGE SUMMARY
Providers





- Providers


Date of Admission: 


08/08/22 22:37





Date of discharge: 08/15/22


Attending physician: 


DIANNA HERNÁNDEZ MD





                                        





08/08/22 23:21


Consult to Physician [CONS] Routine 


   Comment: 


   Consulting Provider: BEVERLEY CHOW


   Physician Instructions: 


   Reason For Exam: covid





08/09/22 09:42


Consult to Physician [CONS] Routine 


   Comment: 


   Consulting Provider: EDMOND TAYLOR


   Physician Instructions: 


   Reason For Exam: ESRD management





08/09/22 12:42


Consult to Physician [CONS] Routine 


   Comment: 


   Consulting Provider: ALECIA FRANCIS


   Physician Instructions: 


   Reason For Exam: HFNC management + COVID pneumonia











Primary care physician: 


GENEVA BLAS








Hospitalization


Reason for admission: COVID-19 pneumonia, acute hypoxic respiratory failure


Condition: Serious


Pertinent studies: 





Reviewed. 


Procedures: 





None. 


Hospital course: 





Patient is a 61-year-old female past medical history of ESRD on hemodialysis, 

hypertension, hypothyroidism, and mild protein caloric malnutrition who 

presented to the ED with complaints of shortness of breath that had started 

approximately 2 days prior.  The patient denied any chest pain, fever, chills, 

extended travel, or sick contacts.  The patient did endorse that exertion 

worsens her dyspnea.  Patient was placed on nonrebreather at the nursing home 

prior to presentation, and it was there that she was found to be confirmed 

positive for COVID-19.  In the ED, the patient was hemodynamically stable and s

aturating 94% on room air.  Patient's labs were relatively unremarkable.  

Patient was admitted for management of COVID-19 pneumonia with acute hypoxic 

respiratory failure.  The patient at 1 point required high flow nasal cannula.  

She was started on daily steroids and azithromycin 250 mg daily x5 days.  

Pulmonology was consulted for further management.  Nephrology was consulted for 

management of hemodialysis needs.  Patient is since been weaned down to 2 L 

nasal cannula, the patient is hemodynamically stable.  The patient is medically 

clear for discharge.


Disposition: 01 HOME / SELF CARE / HOMELESS


Final Discharge Diagnosis (Prints w/discharge instructions): COVID-19 pneumonia,

 acute hypoxic respiratory failure, possible acute heart failureruled out, 

elevated D-dimer, ESRD on hemodialysis, macrocytic anemia, hypothyroidism, mild 

protein caloric malnutrition


Time spent for discharge: 45 min 





Core Measure Documentation





- Palliative Care


Palliative Care/ Comfort Measures: Not Applicable





- Core Measures


Any of the following diagnoses?: none





Exam





- Constitutional


Vitals: 


                                        











Temp Pulse Resp BP Pulse Ox


 


 97.5 F L  74   16   131/77   96 


 


 08/15/22 04:49  08/15/22 04:49  08/15/22 04:49  08/15/22 04:49  08/15/22 05:00











General appearance: Present: no acute distress, well-nourished





- EENT


Eyes: Present: PERRL, EOM intact


ENT: hearing intact, clear oral mucosa





- Neck


Neck: Present: supple, normal ROM, other (Permacath in upper R chest)





- Respiratory


Respiratory effort: normal


Respiratory: bilateral: diminished (on 2L nasal cannula)





- Cardiovascular


Rhythm: regular


Heart Sounds: Present: S1 & S2





- Extremities


Extremities: no ischemia, pulses intact, pulses symmetrical, No edema, normal 

temperature, normal color


Peripheral Pulses: within normal limits





- Abdominal


General gastrointestinal: Present: soft, non-tender, non-distended, normal bowel

 sounds


Female genitourinary: Present: deferred





- Rectal


Rectal Exam: deferred





- Integumentary


Integumentary: Present: clear, warm, dry





- Musculoskeletal


Musculoskeletal: strength equal bilaterally





- Psychiatric


Psychiatric: appropriate mood/affect, intact judgment & insight, memory intact, 

cooperative





- Neurologic


Neurologic: CNII-XII intact, moves all extremities





- Allied Health


Allied health notes reviewed: nursing





Plan


Activity: advance as tolerated


Diet: low salt, diabetic


Additional Instructions: Patient is a 61-year-old female past medical history of

 ESRD on hemodialysis, hypertension, hypothyroidism, and mild protein caloric 

malnutrition who presented to the ED with complaints of shortness of breath that

 had started approximately 2 days prior.  The patient denied any chest pain, 

fever, chills, extended travel, or sick contacts.  The patient did endorse that 

exertion worsens her dyspnea.  Patient was placed on nonrebreather at the 

nursing home prior to presentation, and it was there that she was found to be 

confirmed positive for COVID-19.  In the ED, the patient was hemodynamically 

stable and saturating 94% on room air.  Patient's labs were relatively 

unremarkable.  Patient was admitted for management of COVID-19 pneumonia with 

acute hypoxic respiratory failure.  The patient at 1 point required high flow 

nasal cannula.  She was started on daily steroids and azithromycin 250 mg daily 

x5 days.  Pulmonology was consulted for further management.  Nephrology was 

consulted for management of hemodialysis needs.  Patient is since been weaned 

down to 2 L nasal cannula, the patient is hemodynamically stable.  The patient 

is medically clear for discharge.


Care Plan Goals: 


Patient is medically cleared for discharge. 


Assessment: 


Patient is a 61-year-old female past medical history of ESRD on hemodialysis, 

hypertension, hypothyroidism, and mild protein caloric malnutrition who 

presented to the ED with complaints of shortness of breath that had started 

approximately 2 days prior.  The patient denied any chest pain, fever, chills, 

extended travel, or sick contacts.  The patient did endorse that exertion 

worsens her dyspnea.  Patient was placed on nonrebreather at the nursing home 

prior to presentation, and it was there that she was found to be confirmed 

positive for COVID-19.  In the ED, the patient was hemodynamically stable and 

saturating 94% on room air.  Patient's labs were relatively unremarkable.  

Patient was admitted for management of COVID-19 pneumonia with acute hypoxic 

respiratory failure.  The patient at 1 point required high flow nasal cannula.  

She was started on daily steroids and azithromycin 250 mg daily x5 days.  

Pulmonology was consulted for further management.  Nephrology was consulted for 

management of hemodialysis needs.  Patient is since been weaned down to 2 L 

nasal cannula, the patient is hemodynamically stable.  The patient is medically 

clear for discharge.


Follow up with: 


GENEVA BLAS MD [Primary Care Provider] - 3-5 Days


Prescriptions: 


dexAMETHasone [Decadron] 8 mg PO DAILY #3 tablet


NIFEdipine XL [Procardia Xl] 60 mg PO Q12HR #60 tablet

## 2022-08-15 NOTE — PROGRESS NOTE
Assessment and Plan





62 y/o with acute respiratory failure secondary to COVID 19





8/15/22:  10 days total of dex.  Continue to prone at home.  Suggested walk test

prior to discharge to evaluate oxygen needs.





8/14/22:  Wean FiO2 to off.  Steroids for 10 days.  Prone daily and sleep prone.





8/13/22:  Continue to wean FiO2 to off for sats > 88% as patient is not on 

oxygen at home.  Steroids.  Prone daily and sleep prone at night if possible.  

Will continue to follow.





8/12/22:  HD really hepled with volume removal.  Should get HD again tomorrow.  

Same recs regarding proning.  Patient not on O2 at home so will need walk test 

prior to discharge.





8/11/22:  Continue steroids.  Actemra ordered by ID.  PRone if possible during 

the day and sleep prone at night.  Needs volume removal with HD, based on 

vitals, BP should tolerate it.  Guarded prognosis.





1.  Dexamethasone as ordered


2.  Agree with ID and use of actemra


3. If able to prone, suggest prone as tolerated during the day and sleep prone 

at night


4.  Wean FiO2 and flow for sats >88%


5.  HD per renal, need to keep daily net negative state if possible


6.  Guarded prognosis.





Subjective


Date of service: 08/15/22


Interval history: 





Patient being discharged today.





Objective


                               Vital Signs - 12hr











  08/15/22 08/15/22 08/15/22





  04:49 05:00 10:00


 


Temperature 97.5 F L  


 


Pulse Rate 74  


 


Respiratory 16  





Rate   


 


Blood Pressure 131/77  


 


O2 Sat by Pulse 96 96 99





Oximetry   











CBC and BMP: 


                                 08/11/22 06:22





                                 08/11/22 06:22


ABG, PT/INR, D-dimer: 


PT/INR, D-dimer











PT  13.5 Sec. (12.2-14.9)   08/08/22  14:49    


 


INR  0.91  (0.87-1.13)   08/08/22  14:49    


 


D-Dimer  1200.26 ng/mlDDU (0-234)  H  08/11/22  06:22    








Abnormal lab findings: 


                                  Abnormal Labs











  08/08/22 08/08/22 08/08/22





  14:49 14:49 14:49


 


RBC  3.08 L  


 


Hgb  9.6 L  


 


Hct   


 


MCV  99 H  


 


RDW  17.7 H  


 


Lymph % (Auto)   


 


Lymph # (Auto)   


 


Seg Neutrophils %   


 


Seg Neuts % (Manual)  78.0 H  


 


Lymphocytes % (Manual)  3.0 L  


 


Lymphocytes # (Manual)  0.3 L  


 


D-Dimer   


 


Sodium   133 L 


 


Carbon Dioxide   21 L 


 


BUN   39 H 


 


Creatinine   3.7 H 


 


Glucose   


 


Calcium   8.2 L 


 


Ferritin   


 


Lactate Dehydrogenase   


 


Troponin T    0.075 H


 


C-Reactive Protein   


 


NT-Pro-B Natriuret Pep    00762 H


 


Total Protein   5.6 L 


 


Albumin   2.8 L 


 


HDL Cholesterol    64 H


 


Coronavirus (PCR)   


 


SARS-CoV-2 (PCR)   














  08/09/22 08/09/22 08/09/22





  08:04 08:04 08:04


 


RBC   


 


Hgb   


 


Hct   


 


MCV   


 


RDW   


 


Lymph % (Auto)   


 


Lymph # (Auto)   


 


Seg Neutrophils %   


 


Seg Neuts % (Manual)   


 


Lymphocytes % (Manual)   


 


Lymphocytes # (Manual)   


 


D-Dimer   1044.30 H 


 


Sodium   


 


Carbon Dioxide   


 


BUN  48 H  


 


Creatinine  4.0 H  


 


Glucose  147 H  


 


Calcium   


 


Ferritin    458.0 H


 


Lactate Dehydrogenase   


 


Troponin T   


 


C-Reactive Protein  10.50 H  


 


NT-Pro-B Natriuret Pep   


 


Total Protein   


 


Albumin   


 


HDL Cholesterol   


 


Coronavirus (PCR)   


 


SARS-CoV-2 (PCR)   














  08/10/22 08/10/22 08/10/22





  07:21 07:21 10:00


 


RBC  2.83 L  


 


Hgb  8.8 L  


 


Hct  27.9 L  


 


MCV  98 H  


 


RDW  17.2 H  


 


Lymph % (Auto)   


 


Lymph # (Auto)   


 


Seg Neutrophils %   


 


Seg Neuts % (Manual)  93.0 H  


 


Lymphocytes % (Manual)  6.0 L  


 


Lymphocytes # (Manual)  0.5 L  


 


D-Dimer   


 


Sodium   


 


Carbon Dioxide   31 H 


 


BUN   29 H 


 


Creatinine   2.6 H 


 


Glucose   


 


Calcium   8.2 L 


 


Ferritin   


 


Lactate Dehydrogenase   


 


Troponin T   


 


C-Reactive Protein   


 


NT-Pro-B Natriuret Pep   


 


Total Protein   


 


Albumin   


 


HDL Cholesterol   


 


Coronavirus (PCR)    Positive A


 


SARS-CoV-2 (PCR)   














  08/11/22 08/11/22 08/11/22





  06:22 06:22 06:22


 


RBC  2.65 L  


 


Hgb  8.2 L  


 


Hct  25.9 L  


 


MCV  98 H  


 


RDW  16.7 H  


 


Lymph % (Auto)  5.9 L  


 


Lymph # (Auto)  0.3 L  


 


Seg Neutrophils %  89.9 H  


 


Seg Neuts % (Manual)   


 


Lymphocytes % (Manual)   


 


Lymphocytes # (Manual)   


 


D-Dimer   1200.26 H 


 


Sodium   


 


Carbon Dioxide   


 


BUN    50 H


 


Creatinine    3.4 H


 


Glucose   


 


Calcium    8.2 L


 


Ferritin   


 


Lactate Dehydrogenase    211 H


 


Troponin T   


 


C-Reactive Protein    2.10 H


 


NT-Pro-B Natriuret Pep   


 


Total Protein   


 


Albumin   


 


HDL Cholesterol   


 


Coronavirus (PCR)   


 


SARS-CoV-2 (PCR)   














  08/11/22 08/15/22





  06:22 12:40


 


RBC  


 


Hgb  


 


Hct  


 


MCV  


 


RDW  


 


Lymph % (Auto)  


 


Lymph # (Auto)  


 


Seg Neutrophils %  


 


Seg Neuts % (Manual)  


 


Lymphocytes % (Manual)  


 


Lymphocytes # (Manual)  


 


D-Dimer  


 


Sodium  


 


Carbon Dioxide  


 


BUN  


 


Creatinine  


 


Glucose  


 


Calcium  


 


Ferritin  354.3 H 


 


Lactate Dehydrogenase  


 


Troponin T  


 


C-Reactive Protein  


 


NT-Pro-B Natriuret Pep  


 


Total Protein  


 


Albumin  


 


HDL Cholesterol  


 


Coronavirus (PCR)  


 


SARS-CoV-2 (PCR)   Positive A

## 2022-08-15 NOTE — PROGRESS NOTE
Assessment and Plan





This is a 61 year old woman who presents with dyspnea, COVID-19





# ESRD: continue HD Tu/Th/Sa


- daily labs


- renally dose meds


- avoid nephrotoxins


- renal diet


- verbal consent obtained for HD





# Anemia: ESAs with HD TIW  





# HTN: UF as tolerated, aim net negative as able.  BP stable





# Secondary Hyperparathyroidism: continue home binders as needed, vitamin D 

analogs prn





# COVID-19 Pneumonia, Respiratory Failure: improving, pulmonary/ID also 

following





# CHF: note BNP 13K on admission, UF as tolerated, goal net negative











Subjective


Date of service: 08/15/22


Principal diagnosis: Covid pneumonia


Interval history: 


Now off oxygen.


Vitals, labs, I/O reviewed.








Objective





- Exam


Narrative Exam: 


Covid positive, exam deferred for PPE conservation, primary team exam reviewed.








- Vital Signs


Vital signs: 


                               Vital Signs - 12hr











  08/15/22 08/15/22





  04:49 05:00


 


Temperature 97.5 F L 


 


Pulse Rate 74 


 


Respiratory 16 





Rate  


 


Blood Pressure 131/77 


 


O2 Sat by Pulse 96 96





Oximetry  














- Lab





                                 08/11/22 06:22





                                 08/11/22 06:22


                             Most recent lab results











Calcium  8.2 mg/dL (8.4-10.2)  L  08/11/22  06:22    














Medications & Allergies





- Medications


Allergies/Adverse Reactions: 


                                    Allergies





NSAIDS (Non-Steroidal Anti-Inflamma Allergy (Verified 08/08/22 13:30)


   Rash


Sulfa (Sulfonamide Antibiotics) Allergy (Verified 08/08/22 13:30)


   Rash








Home Medications: 


                                Home Medications











 Medication  Instructions  Recorded  Confirmed  Last Taken  Type


 


Ascorbic Acid [Vitamin C] 500 mg PO DAILY 08/08/22 08/08/22 Unknown History


 


Ferrous Sulfate [Ferrous Sulfate 324 mg PO DAILY 08/08/22 08/08/22 Unknown 

History





324 MG]     


 


Levothyroxine Sodium 200 mcg PO DAILY 08/08/22 08/08/22 Unknown History





[Levothyroxine]     


 


Venlafaxine [Effexor 37.5mg tab] 37.5 mg PO QDAY 08/08/22 08/08/22 Unknown 

History


 


carvediloL [Coreg] 12.5 mg PO BID 08/08/22 08/08/22 Unknown History


 


sevelamer HCL [Sevelamer HCl] 800 mg PO TID 08/08/22 08/08/22 Unknown History


 


Benzonatate [Tessalon Perles] 100 mg PO Q8HR PRN #30 cap 08/15/22  Unknown Rx


 


NIFEdipine XL [Procardia Xl] 60 mg PO Q12HR #60 tablet 08/15/22  Unknown Rx


 


dexAMETHasone [Decadron] 8 mg PO DAILY #3 tablet 08/15/22  Unknown Rx











Active Medications: 














Generic Name Dose Route Start Last Admin





  Trade Name Freq  PRN Reason Stop Dose Admin


 


Acetaminophen  650 mg  08/08/22 23:21  08/14/22 20:27





  Acetaminophen 325 Mg Tab  PO   650 mg





  Q4H PRN   Administration





  Pain MILD(1-3)/Fever >100.5/HA  


 


Albuterol  2.5 mg  08/08/22 23:21 





  Albuterol 2.5 Mg/3 Ml Nebu  IH  





  Q3HRT PRN  





  Shortness Of Breath  


 


Ascorbic Acid  500 mg  08/09/22 10:00  08/14/22 09:38





  Ascorbic Acid 500 Mg Tab  PO   500 mg





  DAILY BERHANE   Administration


 


Carvedilol  12.5 mg  08/09/22 10:00  08/14/22 22:27





  Carvedilol 12.5 Mg Tab  PO   12.5 mg





  BID BERHANE   Administration


 


Dexamethasone  8 mg  08/15/22 10:00 





  Dexamethasone 4 Mg Tab  PO  08/17/22 10:01 





  DAILY BERHANE  


 


Epoetin Diony-epbx  20,000 unit  08/11/22 14:10  08/11/22 17:20





  Epoetin Diony-Epbx 20,000 Unit/1 Ml Vial  IV   20,000 unit





  NASH PRN   Administration





  hemodialysis  


 


Famotidine  20 mg  08/09/22 10:00  08/14/22 09:38





  Famotidine 20 Mg Tab  PO   20 mg





  DAILY BERHANE   Administration


 


Ferrous Sulfate  325 mg  08/09/22 10:00  08/14/22 09:39





  Ferrous Sulfate 325 Mg Tab  PO   325 mg





  DAILY BERHANE   Administration


 


Guaifenesin  200 mg  08/09/22 18:23  08/15/22 06:11





  Guaifenesin 100 Mg/5 Ml Oral Liqd  PO   200 mg





  Q4H PRN   Administration





  Cough  


 


Heparin Sodium (Porcine)  5,000 unit  08/09/22 06:00  08/15/22 06:11





  Heparin 5,000 Unit/1 Ml Vial  SUB-Q   5,000 unit





  Q8HR BERHANE   Administration


 


Sodium Chloride  100 mls @ 999 mls/hr  08/09/22 10:30 





  Nacl 0.9%  IV  





  NASH PRN  





  Hypotension  


 


Levothyroxine Sodium  200 mcg  08/09/22 06:00  08/15/22 06:11





  Levothyroxine 100 Mcg Tab  PO   200 mcg





  QAM@0600 BERHANE   Administration


 


Morphine Sulfate  2 mg  08/08/22 23:21 





  Morphine 2 Mg/1 Ml Inj  IV  





  Q4H PRN  





  Pain, Moderate (4-6)  


 


Morphine Sulfate  4 mg  08/08/22 23:21 





  Morphine 4 Mg/1 Ml Inj  IV  





  Q4H PRN  





  Pain , Severe (7-10)  


 


Nifedipine  60 mg  08/13/22 10:00  08/14/22 22:28





  Nifedipine Xl 60 Mg Tab  PO   Not Given





  Q12HR BERHANE  


 


Ondansetron HCl  4 mg  08/08/22 23:21 





  Ondansetron 4 Mg/2 Ml Inj  IV  





  Q8H PRN  





  Nausea And Vomiting  


 


Sevelamer Carbonate  800 mg  08/09/22 08:00  08/14/22 17:24





  Sevelamer Carbonate 800 Mg Tab  PO   800 mg





  TIDWM BERHANE   Administration


 


Sodium Chloride  10 ml  08/09/22 10:00  08/14/22 22:29





  Sodium Chloride 0.9% 10 Ml Flush Syringe  IV   10 ml





  BID BERHANE   Administration


 


Sodium Chloride  10 ml  08/08/22 23:21 





  Sodium Chloride 0.9% 10 Ml Flush Syringe  IV  





  PRN PRN  





  LINE FLUSH

## 2022-08-16 PROCEDURE — 5A1D70Z PERFORMANCE OF URINARY FILTRATION, INTERMITTENT, LESS THAN 6 HOURS PER DAY: ICD-10-PCS | Performed by: INTERNAL MEDICINE

## 2022-08-16 RX ADMIN — ACETAMINOPHEN PRN MG: 325 TABLET ORAL at 18:43

## 2022-08-16 RX ADMIN — LEVOTHYROXINE SODIUM SCH MCG: 0.1 TABLET ORAL at 05:36

## 2022-08-16 RX ADMIN — HEPARIN SODIUM SCH UNIT: 5000 INJECTION, SOLUTION INTRAVENOUS; SUBCUTANEOUS at 21:23

## 2022-08-16 RX ADMIN — DEXAMETHASONE SCH MG: 4 TABLET ORAL at 10:03

## 2022-08-16 RX ADMIN — SEVELAMER CARBONATE SCH: 800 TABLET, FILM COATED ORAL at 11:28

## 2022-08-16 RX ADMIN — Medication SCH ML: at 10:03

## 2022-08-16 RX ADMIN — SEVELAMER CARBONATE SCH MG: 800 TABLET, FILM COATED ORAL at 10:03

## 2022-08-16 RX ADMIN — FERROUS SULFATE TAB 325 MG (65 MG ELEMENTAL FE) SCH MG: 325 (65 FE) TAB at 10:03

## 2022-08-16 RX ADMIN — SEVELAMER CARBONATE SCH MG: 800 TABLET, FILM COATED ORAL at 17:44

## 2022-08-16 RX ADMIN — Medication SCH ML: at 21:23

## 2022-08-16 RX ADMIN — NIFEDIPINE SCH MG: 60 TABLET, EXTENDED RELEASE ORAL at 21:23

## 2022-08-16 RX ADMIN — OXYCODONE HYDROCHLORIDE AND ACETAMINOPHEN SCH MG: 500 TABLET ORAL at 10:03

## 2022-08-16 RX ADMIN — HEPARIN SODIUM SCH UNIT: 5000 INJECTION, SOLUTION INTRAVENOUS; SUBCUTANEOUS at 13:11

## 2022-08-16 RX ADMIN — FAMOTIDINE SCH MG: 20 TABLET ORAL at 10:03

## 2022-08-16 RX ADMIN — HEPARIN SODIUM SCH UNIT: 5000 INJECTION, SOLUTION INTRAVENOUS; SUBCUTANEOUS at 05:36

## 2022-08-16 RX ADMIN — NIFEDIPINE SCH MG: 60 TABLET, EXTENDED RELEASE ORAL at 10:03

## 2022-08-16 NOTE — PROGRESS NOTE
Assessment and Plan


Assessment and plan: 





#COVID-19 pneumonia


#Acute hypoxic respiratory failure-improving


- etiology: Secondary to COVID-19 pneumonia 


- baseline oxygen requirements: Room air


- supplemental oxygen: 3L nasal cannula


- Continue protocol: continue pulse oximetry, wean oxygen as tolerated, 

dexamethasone 8 mg daily x10 days, azithromycin 250 mg daily x5 days (completed 

on 8/12/2022), airborne and droplet precautions


-Infectious disease consulted; appreciate recs.  Patient not currently candidate

for remdesivir given renal function.


 Pulmonology consulted; appreciate recs


- continue to monitor





#Possible acute heart failure-ruled out


- Continue CHF exacerbation protocol: Telemetry, Strict I/O, monitor urine 

output every shift, daily weights, afterload reduction, low-sodium diet, and 

fluid restriction of approximately 1.5 mL/day, IV Lasix 40 mg twice daily


- Supplemental oxygen: NC @3LPM


- ProBNP on admission: 13,672


- TTE (8/8/2022) revealing EF 55-60%, normal-sized LV, normal LV systolic 

function, mild concentric LVH with mild diastolic dysfunction, and RVSP is 41 

mmHg.


- Continue to monitor





#Elevated D-dimer


 D-dimer 1044


 Unremarkable bilateral venous Dopplers to evaluate for possible DVT.  Continue

to monitor.





#ESRD on hemodialysis


-Access: Permacath in right upper chest


-Outpatient schedule: Unknown


-HD center: N/A


-Nephrology consulted; appreciate recs. 


-Renally dose medications and avoid nephrotoxic drugs. Renal diet.





#Macrocytic anemia


 Hemoglobin 8.8


 Transfuse if hemoglobin <7 or patient becomes symptomatic.  Continue to 

monitor.





#Hypothyroidism


 Continue home levothyroxine 200 mcg daily





#Mild protein caloric malnutrition


 Albumin 2.5


 Continue dietary supplementation





#Advanced care planning


-Disease education conducted, care plan discussed, diagnoses discussed, 

prognosis discussed, and patient acknowledges understanding with care plan


-Time: +30 min





#Discharge planning


- Patient is pending further respiratory improvement.


- Case management has been made aware. 


- Discharge is tentatively once bed is available at rehab





History


Interval history: 





No acute events overnight.  Patient reports feeling well.  Currently receiving 

hemodialysis.  She has no acute complaints at this time.





Hospitalist Physical





- Physical exam


Narrative exam: 





GENERAL: Well-developed well-nourished. In no acute distress.


HEENT: NC@2LPM


NECK: R sided permacath.


CHEST/LUNGS: CTAB on room air


HEART/CARDIOVASCULAR: RRR. No murmur, rubs or gallops appreciated.


ABDOMEN: +BS. NT/ND.


SKIN: No rashes noted.


NEURO:  No focal motor deficit.  Follows all commands.


MUSCULOSKELETAL: No joint effusion 


EXTREMITIES: No cyanosis, clubbing or edema.


PSYCH: Cooperative.





- Constitutional


Vitals: 


                                        











Temp Pulse Resp BP Pulse Ox


 


 97.4 F L  84   18   118/66   98 


 


 08/16/22 10:04  08/16/22 10:04  08/16/22 10:04  08/16/22 10:04  08/16/22 11:28











General appearance: Present: no acute distress, well-nourished





HEART Score





- HEART Score


Troponin: 


                                        











Troponin T  0.075 ng/mL (0.00-0.029)  H  08/08/22  14:49    














Results





- Labs


CBC & Chem 7: 


                                 08/11/22 06:22





                                 08/11/22 06:22


Labs: 


                             Laboratory Last Values











WBC  5.3 K/mm3 (4.5-11.0)   08/11/22  06:22    


 


RBC  2.65 M/mm3 (3.65-5.03)  L  08/11/22  06:22    


 


Hgb  8.2 gm/dl (10.1-14.3)  L  08/11/22  06:22    


 


Hct  25.9 % (30.3-42.9)  L  08/11/22  06:22    


 


MCV  98 fl (79-97)  H  08/11/22  06:22    


 


MCH  31 pg (28-32)   08/11/22  06:22    


 


MCHC  32 % (30-34)   08/11/22  06:22    


 


RDW  16.7 % (13.2-15.2)  H  08/11/22  06:22    


 


Plt Count  172 K/mm3 (140-440)   08/11/22  06:22    


 


Lymph % (Auto)  5.9 % (13.4-35.0)  L  08/11/22  06:22    


 


Mono % (Auto)  4.1 % (0.0-7.3)   08/11/22  06:22    


 


Eos % (Auto)  0.0 % (0.0-4.3)   08/11/22  06:22    


 


Baso % (Auto)  0.1 % (0.0-1.8)   08/11/22  06:22    


 


Lymph # (Auto)  0.3 K/mm3 (1.2-5.4)  L  08/11/22  06:22    


 


Mono # (Auto)  0.2 K/mm3 (0.0-0.8)   08/11/22  06:22    


 


Eos # (Auto)  0.0 K/mm3 (0.0-0.4)   08/11/22  06:22    


 


Baso # (Auto)  0.0 K/mm3 (0.0-0.1)   08/11/22  06:22    


 


Add Manual Diff  Complete   08/10/22  07:21    


 


Total Counted  100   08/10/22  07:21    


 


Seg Neutrophils %  89.9 % (40.0-70.0)  H  08/11/22  06:22    


 


Seg Neuts % (Manual)  93.0 % (40.0-70.0)  H  08/10/22  07:21    


 


Band Neutrophils %  0 %  08/10/22  07:21    


 


Lymphocytes % (Manual)  6.0 % (13.4-35.0)  L  08/10/22  07:21    


 


Reactive Lymphs % (Man)  0 %  08/10/22  07:21    


 


Monocytes % (Manual)  0 % (0.0-7.3)   08/10/22  07:21    


 


Eosinophils % (Manual)  0 % (0.0-4.3)   08/10/22  07:21    


 


Basophils % (Manual)  0 % (0.0-1.8)   08/10/22  07:21    


 


Metamyelocytes %  0 %  08/10/22  07:21    


 


Myelocytes %  1.0 %  08/10/22  07:21    


 


Promyelocytes %  0 %  08/10/22  07:21    


 


Blast Cells %  0 %  08/10/22  07:21    


 


Nucleated RBC %  Not Reportable   08/10/22  07:21    


 


Seg Neutrophils #  4.8 K/mm3 (1.8-7.7)   08/11/22  06:22    


 


Seg Neutrophils # Man  7.4 K/mm3 (1.8-7.7)   08/10/22  07:21    


 


Band Neutrophils #  0.0 K/mm3  08/10/22  07:21    


 


Lymphocytes # (Manual)  0.5 K/mm3 (1.2-5.4)  L  08/10/22  07:21    


 


Abs React Lymphs (Man)  0.0 K/mm3  08/10/22  07:21    


 


Monocytes # (Manual)  0.0 K/mm3 (0.0-0.8)   08/10/22  07:21    


 


Eosinophils # (Manual)  0.0 K/mm3 (0.0-0.4)   08/10/22  07:21    


 


Basophils # (Manual)  0.0 K/mm3 (0.0-0.1)   08/10/22  07:21    


 


Metamyelocytes #  0.0 K/mm3  08/10/22  07:21    


 


Myelocytes #  0.1 K/mm3  08/10/22  07:21    


 


Promyelocytes #  0.0 K/mm3  08/10/22  07:21    


 


Blast Cells #  0.0 K/mm3  08/10/22  07:21    


 


WBC Morphology  Not Reportable   08/10/22  07:21    


 


Hypersegmented Neuts  Not Reportable   08/10/22  07:21    


 


Hyposegmented Neuts  Not Reportable   08/10/22  07:21    


 


Hypogranular Neuts  Not Reportable   08/10/22  07:21    


 


Smudge Cells  Not Reportable   08/10/22  07:21    


 


Toxic Granulation  Not Reportable   08/10/22  07:21    


 


Toxic Vacuolation  Not Reportable   08/10/22  07:21    


 


Dohle Bodies  Not Reportable   08/10/22  07:21    


 


Pelger-Huet Anomaly  Not Reportable   08/10/22  07:21    


 


Komal Rods  Not Reportable   08/10/22  07:21    


 


Platelet Estimate  Consistent w auto   08/10/22  07:21    


 


Clumped Platelets  Not Reportable   08/10/22  07:21    


 


Plt Clumps, EDTA  Not Reportable   08/10/22  07:21    


 


Large Platelets  Not Reportable   08/10/22  07:21    


 


Giant Platelets  Not Reportable   08/10/22  07:21    


 


Platelet Satelliting  Not Reportable   08/10/22  07:21    


 


Plt Morphology Comment  Not Reportable   08/10/22  07:21    


 


RBC Morphology  Not Reportable   08/10/22  07:21    


 


Dimorphic RBCs  Not Reportable   08/10/22  07:21    


 


Polychromasia  Not Reportable   08/10/22  07:21    


 


Hypochromasia  Not Reportable   08/10/22  07:21    


 


Poikilocytosis  Not Reportable   08/10/22  07:21    


 


Anisocytosis  1+   08/10/22  07:21    


 


Microcytosis  Not Reportable   08/10/22  07:21    


 


Macrocytosis  Not Reportable   08/10/22  07:21    


 


Spherocytes  Not Reportable   08/10/22  07:21    


 


Pappenheimer Bodies  Not Reportable   08/10/22  07:21    


 


Sickle Cells  Not Reportable   08/10/22  07:21    


 


Target Cells  Not Reportable   08/10/22  07:21    


 


Tear Drop Cells  Not Reportable   08/10/22  07:21    


 


Ovalocytes  Not Reportable   08/10/22  07:21    


 


Helmet Cells  Not Reportable   08/10/22  07:21    


 


[place] Bodies  Not Reportable   08/10/22  07:21    


 


Cabot Rings  Not Reportable   08/10/22  07:21    


 


[place] Cells  Not Reportable   08/10/22  07:21    


 


Bite Cells  Not Reportable   08/10/22  07:21    


 


Crenated Cell  Not Reportable   08/10/22  07:21    


 


Elliptocytes  Not Reportable   08/10/22  07:21    


 


Acanthocytes (Spur)  Not Reportable   08/10/22  07:21    


 


Rouleaux  Not Reportable   08/10/22  07:21    


 


Hemoglobin C Crystals  Not Reportable   08/10/22  07:21    


 


Schistocytes  Not Reportable   08/10/22  07:21    


 


Malaria parasites  Not Reportable   08/10/22  07:21    


 


Sarabjit Bodies  Not Reportable   08/10/22  07:21    


 


Hem Pathologist Commnt  No   08/10/22  07:21    


 


PT  13.5 Sec. (12.2-14.9)   08/08/22  14:49    


 


INR  0.91  (0.87-1.13)   08/08/22  14:49    


 


APTT  33.8 Sec. (24.2-36.6)   08/08/22  14:49    


 


D-Dimer  1200.26 ng/mlDDU (0-234)  H  08/11/22  06:22    


 


Sodium  141 mmol/L (137-145)   08/11/22  06:22    


 


Potassium  3.9 mmol/L (3.6-5.0)   08/11/22  06:22    


 


Chloride  101.5 mmol/L ()   08/11/22  06:22    


 


Carbon Dioxide  30 mmol/L (22-30)   08/11/22  06:22    


 


Anion Gap  13 mmol/L  08/11/22  06:22    


 


BUN  50 mg/dL (7-17)  H  08/11/22  06:22    


 


Creatinine  3.4 mg/dL (0.6-1.2)  H  08/11/22  06:22    


 


Estimated GFR  14 ml/min  08/11/22  06:22    


 


BUN/Creatinine Ratio  15 %  08/11/22  06:22    


 


Glucose  99 mg/dL ()   08/11/22  06:22    


 


POC Glucose  81 mg/dL ()   08/15/22  08:20    


 


Calcium  8.2 mg/dL (8.4-10.2)  L  08/11/22  06:22    


 


Ferritin  354.3 ng/mL (10.0-200.0)  H  08/11/22  06:22    


 


Total Bilirubin  0.20 mg/dL (0.1-1.2)   08/08/22  14:49    


 


AST  26 units/L (5-40)   08/08/22  14:49    


 


ALT  14 units/L (7-56)   08/08/22  14:49    


 


Alkaline Phosphatase  70 units/L ()   08/08/22  14:49    


 


Lactate Dehydrogenase  211 units/L ()  H  08/11/22  06:22    


 


Troponin T  0.075 ng/mL (0.00-0.029)  H  08/08/22  14:49    


 


C-Reactive Protein  2.10 mg/dL (0.00-1.30)  H  08/11/22  06:22    


 


NT-Pro-B Natriuret Pep  36342 pg/mL (0-900)  H  08/08/22  14:49    


 


Total Protein  5.6 g/dL (6.3-8.2)  L  08/08/22  14:49    


 


Albumin  2.8 g/dL (3.9-5)  L  08/08/22  14:49    


 


Albumin/Globulin Ratio  1.0 %  08/08/22  14:49    


 


Triglycerides  128 mg/dL (2-149)   08/08/22  14:49    


 


Cholesterol  154 mg/dL ()   08/08/22  14:49    


 


LDL Cholesterol Direct  63 mg/dL ()   08/08/22  14:49    


 


HDL Cholesterol  64 mg/dL (40-59)  H  08/08/22  14:49    


 


Cholesterol/HDL Ratio  2.40 %  08/08/22  14:49    


 


Nasal Screen MRSA (PCR)  Negative  (Negative)   08/09/22  02:09    


 


Coronavirus (PCR)  Positive  (Negative)  A  08/10/22  10:00    


 


SARS-CoV-2 (PCR)  Positive  (Negative)  A  08/15/22  12:40    


 


Hepatitis A IgM Ab  Non-reactive  (NonReactive)   08/09/22  10:27    


 


Hep Bs Antigen  Non-reactive  (Negative)   08/09/22  10:27    


 


Hep B Core IgM Ab  Non-reactive  (NonReactive)   08/09/22  10:27    


 


Hepatitis C Antibody  Non-reactive  (NonReactive)   08/09/22  10:27    











Chang/IV: 


                                        





Voiding Method                   Bedpan











Active Medications





- Current Medications


Current Medications: 














Generic Name Dose Route Start Last Admin





  Trade Name Freq  PRN Reason Stop Dose Admin


 


Acetaminophen  650 mg  08/08/22 23:21  08/15/22 17:44





  Acetaminophen 325 Mg Tab  PO   650 mg





  Q4H PRN   Administration





  Pain MILD(1-3)/Fever >100.5/HA  


 


Albuterol  2.5 mg  08/08/22 23:21 





  Albuterol 2.5 Mg/3 Ml Nebu  IH  





  Q3HRT PRN  





  Shortness Of Breath  


 


Ascorbic Acid  500 mg  08/09/22 10:00  08/16/22 10:03





  Ascorbic Acid 500 Mg Tab  PO   500 mg





  DAILY BERHANE   Administration


 


Carvedilol  12.5 mg  08/09/22 10:00  08/16/22 10:03





  Carvedilol 12.5 Mg Tab  PO   12.5 mg





  BID BERHANE   Administration


 


Dexamethasone  8 mg  08/15/22 10:00  08/16/22 10:03





  Dexamethasone 4 Mg Tab  PO  08/17/22 10:01  8 mg





  DAILY BERHANE   Administration


 


Epoetin Diony-epbx  20,000 unit  08/11/22 14:10  08/11/22 17:20





  Epoetin Diony-Epbx 20,000 Unit/1 Ml Vial  IV   20,000 unit





  NASH PRN   Administration





  hemodialysis  


 


Famotidine  20 mg  08/09/22 10:00  08/16/22 10:03





  Famotidine 20 Mg Tab  PO   20 mg





  DAILY BERHANE   Administration


 


Ferrous Sulfate  325 mg  08/09/22 10:00  08/16/22 10:03





  Ferrous Sulfate 325 Mg Tab  PO   325 mg





  DAILY BERHANE   Administration


 


Guaifenesin  200 mg  08/09/22 18:23  08/15/22 06:11





  Guaifenesin 100 Mg/5 Ml Oral Liqd  PO   200 mg





  Q4H PRN   Administration





  Cough  


 


Heparin Sodium (Porcine)  5,000 unit  08/09/22 06:00  08/16/22 13:11





  Heparin 5,000 Unit/1 Ml Vial  SUB-Q   5,000 unit





  Q8HR BERHANE   Administration


 


Sodium Chloride  100 mls @ 999 mls/hr  08/09/22 10:30 





  Nacl 0.9%  IV  





  NASH PRN  





  Hypotension  


 


Levothyroxine Sodium  200 mcg  08/09/22 06:00  08/16/22 05:36





  Levothyroxine 100 Mcg Tab  PO   200 mcg





  QAM@0600 BERHANE   Administration


 


Morphine Sulfate  2 mg  08/08/22 23:21 





  Morphine 2 Mg/1 Ml Inj  IV  





  Q4H PRN  





  Pain, Moderate (4-6)  


 


Morphine Sulfate  4 mg  08/08/22 23:21 





  Morphine 4 Mg/1 Ml Inj  IV  





  Q4H PRN  





  Pain , Severe (7-10)  


 


Nifedipine  60 mg  08/13/22 10:00  08/16/22 10:03





  Nifedipine Xl 60 Mg Tab  PO   60 mg





  Q12HR BERHANE   Administration


 


Ondansetron HCl  4 mg  08/08/22 23:21 





  Ondansetron 4 Mg/2 Ml Inj  IV  





  Q8H PRN  





  Nausea And Vomiting  


 


Sevelamer Carbonate  800 mg  08/09/22 08:00  08/16/22 11:28





  Sevelamer Carbonate 800 Mg Tab  PO   Not Given





  TIDWM BEHRANE  


 


Sodium Chloride  10 ml  08/09/22 10:00  08/16/22 10:03





  Sodium Chloride 0.9% 10 Ml Flush Syringe  IV   10 ml





  BID BERHANE   Administration


 


Sodium Chloride  10 ml  08/08/22 23:21 





  Sodium Chloride 0.9% 10 Ml Flush Syringe  IV  





  PRN PRN  





  LINE FLUSH  














Nutrition/Malnutrition Assess





- Dietary Evaluation


Nutrition/Malnutrition Findings: 


                                        





Nutrition Notes                                            Start:  08/09/22 

12:04


Freq:                                                      Status: Active       




Protocol:                                                                       




 Document     08/16/22 09:59  [place]  (Rec: 08/16/22 10:17  [place]IN  [id number])


 Nutrition Notes


     Initial or Follow up                        Reassessment


     Current Diagnosis                           CKD (stage V CKD),Hypertension


                                                 ,Respiratory Failure,


                                                 Malnutrition


     Other Pertinent Diagnosis                   ESRD+HD, s/p COVID-19/


                                                 Pneumonia, Anemia,


                                                 Hypothyroidism.


     Current Diet                                Cardiac Diet (since B 08/08),


                                                 Cardiac -Renal- Diet+D Suppl (


                                                 from D 08/09).


     Labs/Tests                                  08/16: BUN 50, Crea 3.4, Ca 8.


                                                 2.


     Pertinent Medications                       08/16: Vit C, FeSO4,


                                                 Levothyroxine, Renvela, others


                                                 nutritionally unremarkable.


     Height                                      5 ft 3 in


     Weight                                      72.3 kg


     Ideal Body Weight (kg)                      52.27


     BMI                                         28.2


     Weight change and time frame                0.275 Body weight loss


                                                 reported in 1 week.


     Weight Status                               Overweight


     Subjective/Other Information                RD consult for routine F/U on


                                                 dietary advancement.


                                                 Diet continues as prescribed,


                                                 Pt's PO intake of meals has


                                                 been Good (100%) and well


                                                 tolerated, according to ADL


                                                 notes.


                                                 Pt is on Nasal Cannula, O2


                                                 saturation @ 98%, according to


                                                 Physical Assessment History


                                                 notes.


                                                 Pt shows an unspecified area


                                                 of concern for skin risk at


                                                 the time, according to


                                                 Physical Assessment History


                                                 notes.


                                                 Plans for discharge on 08/16,


                                                 as Pt is medically cleared,


                                                 according to Progress notes.


     Percent of energy/protein needs met:        Prescribed Cardiac -Renal-


                                                 Diet provides for energy/


                                                 protein needs (2,230 Kcal/85 g


                                                 ) during LOS; additionally,


                                                 Dietary Supplements will


                                                 compensate for possible poor


                                                 or insufficient PO intake of


                                                 meals with 850 Kcal and 38 g


                                                 of protein.


     Burn                                        Absent


     Trauma                                      Absent


     GI Symptoms                                 None


     Food Allergy                                No


     Skin Integrity/Comment                      Unspecified area of concern.


     Current % PO                                Good (%)


     Minimum of two criteria                     No


     Fluid Accumulation                          N/A


     Reduced  Strength                       N/A (non-severe)


     Protein-Calorie Malnutrition                N\A


     #2


      Nutrition Diagnosis                        Altered nutrition-related


                                                 laboratory values


      Comments:                                  08/16: BUN 50, Crea 3.4, Ca 8.


                                                 2.


      Diagnosis Progress(for reassessment        Continues


       documentation)                            


     #1


      Nutrition Diagnosis                        Predicted suboptimal energy


                                                 intake


      Comments:                                  Diet continues as prescribed,


                                                 Pt's PO intake of meals has


                                                 been Good (100%) and well


                                                 tolerated, according to ADL


                                                 notes.


      Diagnosis Progress(for reassessment        Resolved


       documentation)                            


     Is patient on ventilator?                   No


     Is Patient Ambulatory and/or Out of Bed     No


     [facility]-([facility]-confined to bed)      [phone]


     Kcal/Kg value to use for calculation        19


     Approximate Energy Requirements Using       1374


      kcal/Kg                                    


     Calculation Used for Recommendations        Kcal/kg


     Additional Notes                            Protein: >1.2 g/Kg ABW; >88 g/


                                                 day.


                                                 Fluids: 1 ml/Kcal, or as per


                                                 MD.


 Nutrition Intervention


     Change Diet Order:                          Continue Renal restriction to


                                                 Cardiac Diet as tolerated.


     Add Supplement/Snack (indicate name/kcal    Discontinue.


      /protein )                                 


     Goal #1                                     Help reach and maintain


                                                 acceptable chemistry lab


                                                 values during LOS.


     Goal #2                                     Adjust the dietary


                                                 intervention to better serve


                                                 Pt's needs and clinical


                                                 conditions during LOS.


     Follow-Up By:                               08/23/22


     Additional Comments                         Continue monitoring food


                                                 tolerance, %PO intake of meals


                                                 , and BM.

## 2022-08-16 NOTE — PROGRESS NOTE
Assessment and Plan





This is a 61 year old woman who presents with dyspnea, COVID-19





# ESRD: continue HD Tu/Th/Sa


- daily labs


- renally dose meds


- avoid nephrotoxins


- renal diet


- verbal consent obtained for HD





# Anemia: ESAs with HD TIW  





# HTN: UF as tolerated, aim net negative as able.  BP stable





# Secondary Hyperparathyroidism: continue home binders as needed, vitamin D 

analogs prn





# COVID-19 Pneumonia, Respiratory Failure: improving, pulmonary/ID also 

following





# CHF: note BNP 13K on admission, UF as tolerated, goal net negative











Subjective


Date of service: 08/16/22


Principal diagnosis: Covid pneumonia


Interval history: 


Remains off oxygen.


Vitals, labs, I/O reviewed.








Objective





- Exam


Narrative Exam: 


Covid positive, exam deferred for PPE conservation, primary team exam reviewed.








- Vital Signs


Vital signs: 


                               Vital Signs - 12hr











  08/16/22 08/16/22 08/16/22





  09:06 10:04 11:28


 


Temperature  97.4 F L 


 


Pulse Rate  84 


 


Respiratory  18 





Rate   


 


Blood Pressure   


 


Blood Pressure  118/66 





[Left]   


 


O2 Sat by Pulse 97 99 98





Oximetry   


 


O2 Sat by Pulse   





Oximetry [   





Anterior   





Bilateral   





Throughout]   














  08/16/22 08/16/22 08/16/22





  13:46 13:50 14:00


 


Temperature 98.3 F  


 


Pulse Rate 79 78 76


 


Respiratory 18  





Rate   


 


Blood Pressure 104/46 100/50 105/54


 


Blood Pressure   





[Left]   


 


O2 Sat by Pulse   





Oximetry   


 


O2 Sat by Pulse 95  





Oximetry [   





Anterior   





Bilateral   





Throughout]   














  08/16/22 08/16/22 08/16/22





  14:15 14:30 14:45


 


Temperature   


 


Pulse Rate 74 82 79


 


Respiratory   





Rate   


 


Blood Pressure 108/63 98/63 115/63


 


Blood Pressure   





[Left]   


 


O2 Sat by Pulse   





Oximetry   


 


O2 Sat by Pulse   





Oximetry [   





Anterior   





Bilateral   





Throughout]   














  08/16/22 08/16/22 08/16/22





  15:00 15:15 15:30


 


Temperature   


 


Pulse Rate 79 78 80


 


Respiratory   





Rate   


 


Blood Pressure 117/57 106/55 118/54


 


Blood Pressure   





[Left]   


 


O2 Sat by Pulse   





Oximetry   


 


O2 Sat by Pulse   





Oximetry [   





Anterior   





Bilateral   





Throughout]   














  08/16/22 08/16/22 08/16/22





  15:45 16:00 16:15


 


Temperature   


 


Pulse Rate 75 78 82


 


Respiratory   





Rate   


 


Blood Pressure 129/66 124/59 117/60


 


Blood Pressure   





[Left]   


 


O2 Sat by Pulse   





Oximetry   


 


O2 Sat by Pulse   





Oximetry [   





Anterior   





Bilateral   





Throughout]   














  08/16/22 08/16/22 08/16/22





  16:30 16:45 16:50


 


Temperature   


 


Pulse Rate 81 78 79


 


Respiratory   





Rate   


 


Blood Pressure 120/69 122/70 120/64


 


Blood Pressure   





[Left]   


 


O2 Sat by Pulse   





Oximetry   


 


O2 Sat by Pulse   





Oximetry [   





Anterior   





Bilateral   





Throughout]   














  08/16/22 08/16/22





  17:30 20:17


 


Temperature 98.2 F 


 


Pulse Rate 79 


 


Respiratory 18 





Rate  


 


Blood Pressure 126/65 


 


Blood Pressure  





[Left]  


 


O2 Sat by Pulse  98





Oximetry  


 


O2 Sat by Pulse 96 





Oximetry [  





Anterior  





Bilateral  





Throughout]  














- Lab





                                 08/11/22 06:22





                                 08/11/22 06:22


                             Most recent lab results











Calcium  8.2 mg/dL (8.4-10.2)  L  08/11/22  06:22    














Medications & Allergies





- Medications


Allergies/Adverse Reactions: 


                                    Allergies





NSAIDS (Non-Steroidal Anti-Inflamma Allergy (Verified 08/08/22 13:30)


   Rash


Sulfa (Sulfonamide Antibiotics) Allergy (Verified 08/08/22 13:30)


   Rash








Home Medications: 


                                Home Medications











 Medication  Instructions  Recorded  Confirmed  Last Taken  Type


 


Ascorbic Acid [Vitamin C] 500 mg PO DAILY 08/08/22 08/08/22 Unknown History


 


Ferrous Sulfate [Ferrous Sulfate 324 mg PO DAILY 08/08/22 08/08/22 Unknown 

History





324 MG]     


 


Levothyroxine Sodium 200 mcg PO DAILY 08/08/22 08/08/22 Unknown History





[Levothyroxine]     


 


Venlafaxine [Effexor 37.5mg tab] 37.5 mg PO QDAY 08/08/22 08/08/22 Unknown 

History


 


carvediloL [Coreg] 12.5 mg PO BID 08/08/22 08/08/22 Unknown History


 


sevelamer HCL [Sevelamer HCl] 800 mg PO TID 08/08/22 08/08/22 Unknown History


 


Benzonatate [Tessalon Perles] 100 mg PO Q8HR PRN #30 cap 08/15/22  Unknown Rx


 


NIFEdipine XL [Procardia Xl] 60 mg PO Q12HR #60 tablet 08/15/22  Unknown Rx


 


dexAMETHasone [Decadron] 8 mg PO DAILY #3 tablet 08/15/22  Unknown Rx











Active Medications: 














Generic Name Dose Route Start Last Admin





  Trade Name Freq  PRN Reason Stop Dose Admin


 


Acetaminophen  650 mg  08/08/22 23:21  08/16/22 18:43





  Acetaminophen 325 Mg Tab  PO   650 mg





  Q4H PRN   Administration





  Pain MILD(1-3)/Fever >100.5/HA  


 


Albuterol  2.5 mg  08/08/22 23:21 





  Albuterol 2.5 Mg/3 Ml Nebu  IH  





  Q3HRT PRN  





  Shortness Of Breath  


 


Ascorbic Acid  500 mg  08/09/22 10:00  08/16/22 10:03





  Ascorbic Acid 500 Mg Tab  PO   500 mg





  DAILY BERHANE   Administration


 


Carvedilol  12.5 mg  08/09/22 10:00  08/16/22 10:03





  Carvedilol 12.5 Mg Tab  PO   12.5 mg





  BID B[facility]HANE   Administration


 


Dexamethasone  8 mg  08/15/22 10:00  08/16/22 10:03





  Dexamethasone 4 Mg Tab  PO  08/17/22 10:01  8 mg





  DAILY [facility]   Administration


 


Epoetin Diony-epbx  20,000 unit  08/11/22 14:10  08/11/22 17:20





  Epoetin Diony-Epbx 20,000 Unit/1 Ml Vial  IV   20,000 unit





  NASH PRN   Administration





  hemodialysis  


 


Famotidine  20 mg  08/09/22 10:00  08/16/22 10:03





  Famotidine 20 Mg Tab  PO   20 mg





  DAILY [facility]   Administration


 


Ferrous Sulfate  325 mg  08/09/22 10:00  08/16/22 10:03





  Ferrous Sulfate 325 Mg Tab  PO   325 mg





  DAILY [facility]   Administration


 


Guaifenesin  200 mg  08/09/22 18:23  08/15/22 06:11





  Guaifenesin 100 Mg/5 Ml Oral Liqd  PO   200 mg





  Q4H PRN   Administration





  Cough  


 


Heparin Sodium (Porcine)  5,000 unit  08/09/22 06:00  08/16/22 13:11





  Heparin 5,000 Unit/1 Ml Vial  SUB-Q   5,000 unit





  Q8HR BER[facility]   Administration


 


Sodium Chloride  100 mls @ 999 mls/hr  08/09/22 10:30 





  Nacl 0.9%  IV  





  NASH PRN  





  Hypotension  


 


Levothyroxine Sodium  200 mcg  08/09/22 06:00  08/16/22 05:36





  Levothyroxine 100 Mcg Tab  PO   200 mcg





  QAM@0600 [facility]   Administration


 


Morphine Sulfate  2 mg  08/08/22 23:21 





  Morphine 2 Mg/1 Ml Inj  IV  





  Q4H PRN  





  Pain, Moderate (4-6)  


 


Morphine Sulfate  4 mg  08/08/22 23:21 





  Morphine 4 Mg/1 Ml Inj  IV  





  Q4H PRN  





  Pain , Severe (7-10)  


 


Nifedipine  60 mg  08/13/22 10:00  08/16/22 10:03





  Nifedipine Xl 60 Mg Tab  PO   60 mg





  Q12HR [facility]   Administration


 


Ondansetron HCl  4 mg  08/08/22 23:21 





  Ondansetron 4 Mg/2 Ml Inj  IV  





  Q8H PRN  





  Nausea And Vomiting  


 


Sevelamer Carbonate  800 mg  08/09/22 08:00  08/16/22 17:44





  Sevelamer Carbonate 800 Mg Tab  PO   800 mg





  TIDWM [facility]   Administration


 


Sodium Chloride  10 ml  08/09/22 10:00  08/16/22 10:03





  Sodium Chloride 0.9% 10 Ml Flush Syringe  IV   10 ml





  BID [facility]   Administration


 


Sodium Chloride  10 ml  08/08/22 23:21 





  Sodium Chloride 0.9% 10 Ml Flush Syringe  IV  





  PRN PRN  





  LINE FLUSH

## 2022-08-16 NOTE — PROGRESS NOTE
Assessment and Plan





62 y/o with acute respiratory failure secondary to COVID 19





8/16/22:  Patient will be discharged today.  Oxygen weaning and assessment for 

home can be determined at next facility.   Steroids for 10 days.





8/15/22:  10 days total of dex.  Continue to prone at home.  Suggested walk test

prior to discharge to evaluate oxygen needs.





8/14/22:  Wean FiO2 to off.  Steroids for 10 days.  Prone daily and sleep prone.





8/13/22:  Continue to wean FiO2 to off for sats > 88% as patient is not on 

oxygen at home.  Steroids.  Prone daily and sleep prone at night if possible.  

Will continue to follow.





8/12/22:  HD really hepled with volume removal.  Should get HD again tomorrow.  

Same recs regarding proning.  Patient not on O2 at home so will need walk test 

prior to discharge.





8/11/22:  Continue steroids.  Actemra ordered by ID.  PRone if possible during 

the day and sleep prone at night.  Needs volume removal with HD, based on 

vitals, BP should tolerate it.  Guarded prognosis.





1.  Dexamethasone as ordered


2.  Agree with ID and use of actemra


3. If able to prone, suggest prone as tolerated during the day and sleep prone 

at night


4.  Wean FiO2 and flow for sats >88%


5.  HD per renal, need to keep daily net negative state if possible


6.  Guarded prognosis.





Subjective


Date of service: 08/16/22


Principal diagnosis: Covid pneumonia


Interval history: 





Bed was not available yesterday.  Pulm status is stable.





Objective


                               Vital Signs - 12hr











  08/15/22 08/15/22 08/16/22





  22:35 23:55 09:06


 


Temperature  97.5 F L 


 


Pulse Rate  81 


 


Respiratory  18 





Rate   


 


Blood Pressure  135/77 


 


O2 Sat by Pulse 98 92 97





Oximetry   











CBC and BMP: 


                                 08/11/22 06:22





                                 08/11/22 06:22


ABG, PT/INR, D-dimer: 


PT/INR, D-dimer











PT  13.5 Sec. (12.2-14.9)   08/08/22  14:49    


 


INR  0.91  (0.87-1.13)   08/08/22  14:49    


 


D-Dimer  1200.26 ng/mlDDU (0-234)  H  08/11/22  06:22    








Abnormal lab findings: 


                                  Abnormal Labs











  08/08/22 08/08/22 08/08/22





  14:49 14:49 14:49


 


RBC  3.08 L  


 


Hgb  9.6 L  


 


Hct   


 


MCV  99 H  


 


RDW  17.7 H  


 


Lymph % (Auto)   


 


Lymph # (Auto)   


 


Seg Neutrophils %   


 


Seg Neuts % (Manual)  78.0 H  


 


Lymphocytes % (Manual)  3.0 L  


 


Lymphocytes # (Manual)  0.3 L  


 


D-Dimer   


 


Sodium   133 L 


 


Carbon Dioxide   21 L 


 


BUN   39 H 


 


Creatinine   3.7 H 


 


Glucose   


 


Calcium   8.2 L 


 


Ferritin   


 


Lactate Dehydrogenase   


 


Troponin T    0.075 H


 


C-Reactive Protein   


 


NT-Pro-B Natriuret Pep    04825 H


 


Total Protein   5.6 L 


 


Albumin   2.8 L 


 


HDL Cholesterol    64 H


 


Coronavirus (PCR)   


 


SARS-CoV-2 (PCR)   














  08/09/22 08/09/22 08/09/22





  08:04 08:04 08:04


 


RBC   


 


Hgb   


 


Hct   


 


MCV   


 


RDW   


 


Lymph % (Auto)   


 


Lymph # (Auto)   


 


Seg Neutrophils %   


 


Seg Neuts % (Manual)   


 


Lymphocytes % (Manual)   


 


Lymphocytes # (Manual)   


 


D-Dimer   1044.30 H 


 


Sodium   


 


Carbon Dioxide   


 


BUN  48 H  


 


Creatinine  4.0 H  


 


Glucose  147 H  


 


Calcium   


 


Ferritin    458.0 H


 


Lactate Dehydrogenase   


 


Troponin T   


 


C-Reactive Protein  10.50 H  


 


NT-Pro-B Natriuret Pep   


 


Total Protein   


 


Albumin   


 


HDL Cholesterol   


 


Coronavirus (PCR)   


 


SARS-CoV-2 (PCR)   














  08/10/22 08/10/22 08/10/22





  07:21 07:21 10:00


 


RBC  2.83 L  


 


Hgb  8.8 L  


 


Hct  27.9 L  


 


MCV  98 H  


 


RDW  17.2 H  


 


Lymph % (Auto)   


 


Lymph # (Auto)   


 


Seg Neutrophils %   


 


Seg Neuts % (Manual)  93.0 H  


 


Lymphocytes % (Manual)  6.0 L  


 


Lymphocytes # (Manual)  0.5 L  


 


D-Dimer   


 


Sodium   


 


Carbon Dioxide   31 H 


 


BUN   29 H 


 


Creatinine   2.6 H 


 


Glucose   


 


Calcium   8.2 L 


 


Ferritin   


 


Lactate Dehydrogenase   


 


Troponin T   


 


C-Reactive Protein   


 


NT-Pro-B Natriuret Pep   


 


Total Protein   


 


Albumin   


 


HDL Cholesterol   


 


Coronavirus (PCR)    Positive A


 


SARS-CoV-2 (PCR)   














  08/11/22 08/11/22 08/11/22





  06:22 06:22 06:22


 


RBC  2.65 L  


 


Hgb  8.2 L  


 


Hct  25.9 L  


 


MCV  98 H  


 


RDW  16.7 H  


 


Lymph % (Auto)  5.9 L  


 


Lymph # (Auto)  0.3 L  


 


Seg Neutrophils %  89.9 H  


 


Seg Neuts % (Manual)   


 


Lymphocytes % (Manual)   


 


Lymphocytes # (Manual)   


 


D-Dimer   1200.26 H 


 


Sodium   


 


Carbon Dioxide   


 


BUN    50 H


 


Creatinine    3.4 H


 


Glucose   


 


Calcium    8.2 L


 


Ferritin   


 


Lactate Dehydrogenase    211 H


 


Troponin T   


 


C-Reactive Protein    2.10 H


 


NT-Pro-B Natriuret Pep   


 


Total Protein   


 


Albumin   


 


HDL Cholesterol   


 


Coronavirus (PCR)   


 


SARS-CoV-2 (PCR)   














  08/11/22 08/15/22





  06:22 12:40


 


RBC  


 


Hgb  


 


Hct  


 


MCV  


 


RDW  


 


Lymph % (Auto)  


 


Lymph # (Auto)  


 


Seg Neutrophils %  


 


Seg Neuts % (Manual)  


 


Lymphocytes % (Manual)  


 


Lymphocytes # (Manual)  


 


D-Dimer  


 


Sodium  


 


Carbon Dioxide  


 


BUN  


 


Creatinine  


 


Glucose  


 


Calcium  


 


Ferritin  354.3 H 


 


Lactate Dehydrogenase  


 


Troponin T  


 


C-Reactive Protein  


 


NT-Pro-B Natriuret Pep  


 


Total Protein  


 


Albumin  


 


HDL Cholesterol  


 


Coronavirus (PCR)  


 


SARS-CoV-2 (PCR)   Positive A

## 2022-08-17 LAB
BUN SERPL-MCNC: 50 MG/DL (ref 7–17)
BUN/CREAT SERPL: 16 %
CALCIUM SERPL-MCNC: 8.1 MG/DL (ref 8.4–10.2)
HEMOLYSIS INDEX: 4

## 2022-08-17 RX ADMIN — NIFEDIPINE SCH MG: 60 TABLET, EXTENDED RELEASE ORAL at 10:53

## 2022-08-17 RX ADMIN — HEPARIN SODIUM SCH UNIT: 5000 INJECTION, SOLUTION INTRAVENOUS; SUBCUTANEOUS at 21:29

## 2022-08-17 RX ADMIN — HEPARIN SODIUM SCH UNIT: 5000 INJECTION, SOLUTION INTRAVENOUS; SUBCUTANEOUS at 05:17

## 2022-08-17 RX ADMIN — FERROUS SULFATE TAB 325 MG (65 MG ELEMENTAL FE) SCH MG: 325 (65 FE) TAB at 10:53

## 2022-08-17 RX ADMIN — OXYCODONE HYDROCHLORIDE AND ACETAMINOPHEN SCH MG: 500 TABLET ORAL at 10:55

## 2022-08-17 RX ADMIN — DEXAMETHASONE SCH MG: 4 TABLET ORAL at 10:53

## 2022-08-17 RX ADMIN — SEVELAMER CARBONATE SCH MG: 800 TABLET, FILM COATED ORAL at 10:53

## 2022-08-17 RX ADMIN — SEVELAMER CARBONATE SCH MG: 800 TABLET, FILM COATED ORAL at 16:55

## 2022-08-17 RX ADMIN — Medication SCH ML: at 21:29

## 2022-08-17 RX ADMIN — NIFEDIPINE SCH: 60 TABLET, EXTENDED RELEASE ORAL at 10:55

## 2022-08-17 RX ADMIN — SEVELAMER CARBONATE SCH: 800 TABLET, FILM COATED ORAL at 11:03

## 2022-08-17 RX ADMIN — NIFEDIPINE SCH MG: 60 TABLET, EXTENDED RELEASE ORAL at 21:28

## 2022-08-17 RX ADMIN — FAMOTIDINE SCH MG: 20 TABLET ORAL at 10:53

## 2022-08-17 RX ADMIN — HEPARIN SODIUM SCH UNIT: 5000 INJECTION, SOLUTION INTRAVENOUS; SUBCUTANEOUS at 13:27

## 2022-08-17 RX ADMIN — ACETAMINOPHEN PRN MG: 325 TABLET ORAL at 21:28

## 2022-08-17 RX ADMIN — Medication SCH: at 10:54

## 2022-08-17 RX ADMIN — LEVOTHYROXINE SODIUM SCH MCG: 0.1 TABLET ORAL at 05:17

## 2022-08-17 NOTE — PROGRESS NOTE
Assessment and Plan


This is a 61 year old woman who presents with dyspnea, COVID-19





# ESRD: continue HD Tu/Th/Sa


- daily labs


- renally dose meds


- avoid nephrotoxins


- renal diet


- verbal consent obtained for HD





# Anemia: ESAs with HD TIW  





# HTN: UF as tolerated, aim net negative as able.  BP stable





# Secondary Hyperparathyroidism: continue home binders as needed, vitamin D 

analogs prn





# COVID-19 Pneumonia, Respiratory Failure: improving, pulmonary/ID also 

following





# CHF: note BNP 13K on admission, UF as tolerated, goal net negative











Subjective


Date of service: 08/17/22


Principal diagnosis: Covid pneumonia


Interval history: 


Vitals, labs, I/O reviewed.








Objective





- Exam


Narrative Exam: 


Covid positive, exam deferred for PPE conservation, primary team exam reviewed.








- Vital Signs


Vital signs: 


                               Vital Signs - 12hr











  08/17/22 08/17/22 08/17/22





  08:23 10:55 14:44


 


Temperature  97.8 F 


 


Pulse Rate  73 


 


Respiratory  20 18





Rate   


 


Blood Pressure  99/46 





[Left]   


 


O2 Sat by Pulse 99 98 98





Oximetry   














- Lab





                                 08/11/22 06:22





                                 08/17/22 11:26


                             Most recent lab results











Calcium  8.1 mg/dL (8.4-10.2)  L  08/17/22  11:26    














Medications & Allergies





- Medications


Allergies/Adverse Reactions: 


                                    Allergies





NSAIDS (Non-Steroidal Anti-Inflamma Allergy (Verified 08/08/22 13:30)


   Rash


Sulfa (Sulfonamide Antibiotics) Allergy (Verified 08/08/22 13:30)


   Rash








Home Medications: 


                                Home Medications











 Medication  Instructions  Recorded  Confirmed  Last Taken  Type


 


Ascorbic Acid [Vitamin C] 500 mg PO DAILY 08/08/22 08/08/22 Unknown History


 


Ferrous Sulfate [Ferrous Sulfate 324 mg PO DAILY 08/08/22 08/08/22 Unknown 

History





324 MG]     


 


Levothyroxine Sodium 200 mcg PO DAILY 08/08/22 08/08/22 Unknown History





[Levothyroxine]     


 


Venlafaxine [Effexor 37.5mg tab] 37.5 mg PO QDAY 08/08/22 08/08/22 Unknown 

History


 


carvediloL [Coreg] 12.5 mg PO BID 08/08/22 08/08/22 Unknown History


 


sevelamer HCL [Sevelamer HCl] 800 mg PO TID 08/08/22 08/08/22 Unknown History


 


Benzonatate [Tessalon Perles] 100 mg PO Q8HR PRN #30 cap 08/15/22  Unknown Rx


 


NIFEdipine XL [Procardia Xl] 60 mg PO Q12HR #60 tablet 08/15/22  Unknown Rx


 


dexAMETHasone [Decadron] 8 mg PO DAILY #3 tablet 08/15/22  Unknown Rx











Active Medications: 














Generic Name Dose Route Start Last Admin





  Trade Name Freq  PRN Reason Stop Dose Admin


 


Acetaminophen  650 mg  08/08/22 23:21  08/16/22 18:43





  Acetaminophen 325 Mg Tab  PO   650 mg





  Q4H PRN   Administration





  Pain MILD(1-3)/Fever >100.5/HA  


 


Albuterol  2.5 mg  08/08/22 23:21 





  Albuterol 2.5 Mg/3 Ml Nebu  IH  





  Q3HRT PRN  





  Shortness Of Breath  


 


Ascorbic Acid  500 mg  08/09/22 10:00  08/17/22 10:55





  Ascorbic Acid 500 Mg Tab  PO   500 mg





  DAILY BERHANE   Administration


 


Carvedilol  12.5 mg  08/09/22 10:00  08/17/22 10:55





  Carvedilol 12.5 Mg Tab  PO   Not Given





  BID BERHANE  


 


Epoetin Diony-epbx  20,000 unit  08/11/22 14:10  08/11/22 17:20





  Epoetin Diony-Epbx 20,000 Unit/1 Ml Vial  IV   20,000 unit





  NASH PRN   Administration





  hemodialysis  


 


Famotidine  20 mg  08/09/22 10:00  08/17/22 10:53





  Famotidine 20 Mg Tab  PO   20 mg





  DAILY BERHANE   Administration


 


Ferrous Sulfate  325 mg  08/09/22 10:00  08/17/22 10:53





  Ferrous Sulfate 325 Mg Tab  PO   325 mg





  DAILY BERHANE   Administration


 


Guaifenesin  200 mg  08/09/22 18:23  08/15/22 06:11





  Guaifenesin 100 Mg/5 Ml Oral Liqd  PO   200 mg





  Q4H PRN   Administration





  Cough  


 


Heparin Sodium (Porcine)  5,000 unit  08/09/22 06:00  08/17/22 13:27





  Heparin 5,000 Unit/1 Ml Vial  SUB-Q   5,000 unit





  Q8HR BERHANE   Administration


 


Sodium Chloride  100 mls @ 999 mls/hr  08/09/22 10:30 





  Nacl 0.9%  IV  





  NASH PRN  





  Hypotension  


 


Levothyroxine Sodium  200 mcg  08/09/22 06:00  08/17/22 05:17





  Levothyroxine 100 Mcg Tab  PO   200 mcg





  QAM@0600 BERHANE   Administration


 


Morphine Sulfate  2 mg  08/08/22 23:21 





  Morphine 2 Mg/1 Ml Inj  IV  





  Q4H PRN  





  Pain, Moderate (4-6)  


 


Morphine Sulfate  4 mg  08/08/22 23:21 





  Morphine 4 Mg/1 Ml Inj  IV  





  Q4H PRN  





  Pain , Severe (7-10)  


 


Nifedipine  60 mg  08/13/22 10:00  08/17/22 10:55





  Nifedipine Xl 60 Mg Tab  PO   Not Given





  Q12HR [facility]  


 


Ondansetron HCl  4 mg  08/08/22 23:21 





  Ondansetron 4 Mg/2 Ml Inj  IV  





  Q8H PRN  





  Nausea And Vomiting  


 


Sevelamer Carbonate  800 mg  08/09/22 08:00  08/17/22 16:55





  Sevelamer Carbonate 800 Mg Tab  PO   800 mg





  TIDWM [facility]   Administration


 


Sodium Chloride  10 ml  08/09/22 10:00  08/17/22 10:54





  Sodium Chloride 0.9% 10 Ml Flush Syringe  IV   Not Given





  BID [facility]  


 


Sodium Chloride  10 ml  08/08/22 23:21 





  Sodium Chloride 0.9% 10 Ml Flush Syringe  IV  





  PRN PRN  





  LINE FLUSH

## 2022-08-17 NOTE — PROGRESS NOTE
Assessment and Plan


Assessment and plan: 





#COVID-19 pneumonia


#Acute hypoxic respiratory failure-improving


- etiology: Secondary to COVID-19 pneumonia 


- baseline oxygen requirements: Room air


- supplemental oxygen: 2L nasal cannula


- Continue protocol: continue pulse oximetry, wean oxygen as tolerated, 

dexamethasone 8 mg daily x10 days, azithromycin 250 mg daily x5 days (completed 

on 8/12/2022), airborne and droplet precautions


-Infectious disease consulted; appreciate recs.  Patient not currently candidate

for remdesivir given renal function.


 Pulmonology consulted; appreciate recs


- continue to monitor





#Possible acute heart failure-ruled out


- Continue CHF exacerbation protocol: Telemetry, Strict I/O, monitor urine 

output every shift, daily weights, afterload reduction, low-sodium diet, and 

fluid restriction of approximately 1.5 mL/day, IV Lasix 40 mg twice daily


- Supplemental oxygen: NC @2LPM


- ProBNP on admission: 13,672


- TTE (8/8/2022) revealing EF 55-60%, normal-sized LV, normal LV systolic 

function, mild concentric LVH with mild diastolic dysfunction, and RVSP is 41 

mmHg.


- Continue to monitor





#Elevated D-dimer


 D-dimer 1044


 Unremarkable bilateral venous Dopplers to evaluate for possible DVT.  Continue

to monitor.





#ESRD on hemodialysis


-Access: Permacath in right upper chest


-Outpatient schedule: Unknown


-HD center: N/A


-Nephrology consulted; appreciate recs. 


-Renally dose medications and avoid nephrotoxic drugs. Renal diet.





#Macrocytic anemia


 Hemoglobin 8.8


 Transfuse if hemoglobin <7 or patient becomes symptomatic.  Continue to 

monitor.





#Hypothyroidism


 Continue home levothyroxine 200 mcg daily





#Mild protein caloric malnutrition


 Albumin 2.5


 Continue dietary supplementation





#Advanced care planning


-Disease education conducted, care plan discussed, diagnoses discussed, 

prognosis discussed, and patient acknowledges understanding with care plan


-Time: +30 min





#Discharge planning


- Patient is pending further respiratory improvement.


- Case management has been made aware. 


- Discharge is tentatively once bed is available at rehab





History


Interval history: 





No acute events overnight.  Patient reports feeling great.  She has no acute 

complaints at this time.  We discussed the current care plan and patient was 

agreeable.





Hospitalist Physical





- Physical exam


Narrative exam: 





GENERAL: Well-developed well-nourished. In no acute distress.


HEENT: NC@2LPM


NECK: R sided permacath.


CHEST/LUNGS: CTAB on room air


HEART/CARDIOVASCULAR: RRR. No murmur, rubs or gallops appreciated.


ABDOMEN: +BS. NT/ND.


SKIN: No rashes noted.


NEURO:  No focal motor deficit.  Follows all commands.


MUSCULOSKELETAL: No joint effusion 


EXTREMITIES: No cyanosis, clubbing or edema.


PSYCH: Cooperative.





- Constitutional


Vitals: 


                                        











Temp Pulse Resp BP Pulse Ox


 


 98.1 F   76   18   109/62   99 


 


 08/17/22 05:54  08/17/22 05:54  08/17/22 05:54  08/17/22 05:54  08/17/22 08:23











General appearance: Present: no acute distress, well-nourished





HEART Score





- HEART Score


Troponin: 


                                        











Troponin T  0.075 ng/mL (0.00-0.029)  H  08/08/22  14:49    














Results





- Labs


CBC & Chem 7: 


                                 08/11/22 06:22





                                 08/11/22 06:22


Labs: 


                             Laboratory Last Values











WBC  5.3 K/mm3 (4.5-11.0)   08/11/22  06:22    


 


RBC  2.65 M/mm3 (3.65-5.03)  L  08/11/22  06:22    


 


Hgb  8.2 gm/dl (10.1-14.3)  L  08/11/22  06:22    


 


Hct  25.9 % (30.3-42.9)  L  08/11/22  06:22    


 


MCV  98 fl (79-97)  H  08/11/22  06:22    


 


MCH  31 pg (28-32)   08/11/22  06:22    


 


MCHC  32 % (30-34)   08/11/22  06:22    


 


RDW  16.7 % (13.2-15.2)  H  08/11/22  06:22    


 


Plt Count  172 K/mm3 (140-440)   08/11/22  06:22    


 


Lymph % (Auto)  5.9 % (13.4-35.0)  L  08/11/22  06:22    


 


Mono % (Auto)  4.1 % (0.0-7.3)   08/11/22  06:22    


 


Eos % (Auto)  0.0 % (0.0-4.3)   08/11/22  06:22    


 


Baso % (Auto)  0.1 % (0.0-1.8)   08/11/22  06:22    


 


Lymph # (Auto)  0.3 K/mm3 (1.2-5.4)  L  08/11/22  06:22    


 


Mono # (Auto)  0.2 K/mm3 (0.0-0.8)   08/11/22  06:22    


 


Eos # (Auto)  0.0 K/mm3 (0.0-0.4)   08/11/22  06:22    


 


Baso # (Auto)  0.0 K/mm3 (0.0-0.1)   08/11/22  06:22    


 


Add Manual Diff  Complete   08/10/22  07:21    


 


Total Counted  100   08/10/22  07:21    


 


Seg Neutrophils %  89.9 % (40.0-70.0)  H  08/11/22  06:22    


 


Seg Neuts % (Manual)  93.0 % (40.0-70.0)  H  08/10/22  07:21    


 


Band Neutrophils %  0 %  08/10/22  07:21    


 


Lymphocytes % (Manual)  6.0 % (13.4-35.0)  L  08/10/22  07:21    


 


Reactive Lymphs % (Man)  0 %  08/10/22  07:21    


 


Monocytes % (Manual)  0 % (0.0-7.3)   08/10/22  07:21    


 


Eosinophils % (Manual)  0 % (0.0-4.3)   08/10/22  07:21    


 


Basophils % (Manual)  0 % (0.0-1.8)   08/10/22  07:21    


 


Metamyelocytes %  0 %  08/10/22  07:21    


 


Myelocytes %  1.0 %  08/10/22  07:21    


 


Promyelocytes %  0 %  08/10/22  07:21    


 


Blast Cells %  0 %  08/10/22  07:21    


 


Nucleated RBC %  Not Reportable   08/10/22  07:21    


 


Seg Neutrophils #  4.8 K/mm3 (1.8-7.7)   08/11/22  06:22    


 


Seg Neutrophils # Man  7.4 K/mm3 (1.8-7.7)   08/10/22  07:21    


 


Band Neutrophils #  0.0 K/mm3  08/10/22  07:21    


 


Lymphocytes # (Manual)  0.5 K/mm3 (1.2-5.4)  L  08/10/22  07:21    


 


Abs React Lymphs (Man)  0.0 K/mm3  08/10/22  07:21    


 


Monocytes # (Manual)  0.0 K/mm3 (0.0-0.8)   08/10/22  07:21    


 


Eosinophils # (Manual)  0.0 K/mm3 (0.0-0.4)   08/10/22  07:21    


 


Basophils # (Manual)  0.0 K/mm3 (0.0-0.1)   08/10/22  07:21    


 


Metamyelocytes #  0.0 K/mm3  08/10/22  07:21    


 


Myelocytes #  0.1 K/mm3  08/10/22  07:21    


 


Promyelocytes #  0.0 K/mm3  08/10/22  07:21    


 


Blast Cells #  0.0 K/mm3  08/10/22  07:21    


 


WBC Morphology  Not Reportable   08/10/22  07:21    


 


Hypersegmented Neuts  Not Reportable   08/10/22  07:21    


 


Hyposegmented Neuts  Not Reportable   08/10/22  07:21    


 


Hypogranular Neuts  Not Reportable   08/10/22  07:21    


 


Smudge Cells  Not Reportable   08/10/22  07:21    


 


Toxic Granulation  Not Reportable   08/10/22  07:21    


 


Toxic Vacuolation  Not Reportable   08/10/22  07:21    


 


Dohle Bodies  Not Reportable   08/10/22  07:21    


 


Pelger-Huet Anomaly  Not Reportable   08/10/22  07:21    


 


Komal Rods  Not Reportable   08/10/22  07:21    


 


Platelet Estimate  Consistent w auto   08/10/22  07:21    


 


Clumped Platelets  Not Reportable   08/10/22  07:21    


 


Plt Clumps, EDTA  Not Reportable   08/10/22  07:21    


 


Large Platelets  Not Reportable   08/10/22  07:21    


 


Giant Platelets  Not Reportable   08/10/22  07:21    


 


Platelet Satelliting  Not Reportable   08/10/22  07:21    


 


Plt Morphology Comment  Not Reportable   08/10/22  07:21    


 


RBC Morphology  Not Reportable   08/10/22  07:21    


 


Dimorphic RBCs  Not Reportable   08/10/22  07:21    


 


Polychromasia  Not Reportable   08/10/22  07:21    


 


Hypochromasia  Not Reportable   08/10/22  07:21    


 


Poikilocytosis  Not Reportable   08/10/22  07:21    


 


Anisocytosis  1+   08/10/22  07:21    


 


Microcytosis  Not Reportable   08/10/22  07:21    


 


Macrocytosis  Not Reportable   08/10/22  07:21    


 


Spherocytes  Not Reportable   08/10/22  07:21    


 


Pappenheimer Bodies  Not Reportable   08/10/22  07:21    


 


Sickle Cells  Not Reportable   08/10/22  07:21    


 


Target Cells  Not Reportable   08/10/22  07:21    


 


Tear Drop Cells  Not Reportable   08/10/22  07:21    


 


Ovalocytes  Not Reportable   08/10/22  07:21    


 


Helmet Cells  Not Reportable   08/10/22  07:21    


 


Price-[place] Bodies  Not Reportable   08/10/22  07:21    


 


Cabot Rings  Not Reportable   08/10/22  07:21    


 


[place] Cells  Not Reportable   08/10/22  07:21    


 


Bite Cells  Not Reportable   08/10/22  07:21    


 


Crenated Cell  Not Reportable   08/10/22  07:21    


 


Elliptocytes  Not Reportable   08/10/22  07:21    


 


Acanthocytes (Spur)  Not Reportable   08/10/22  07:21    


 


Rouleaux  Not Reportable   08/10/22  07:21    


 


Hemoglobin C Crystals  Not Reportable   08/10/22  07:21    


 


Schistocytes  Not Reportable   08/10/22  07:21    


 


Malaria parasites  Not Reportable   08/10/22  07:21    


 


Sarabjit Bodies  Not Reportable   08/10/22  07:21    


 


Hem Pathologist Commnt  No   08/10/22  07:21    


 


PT  13.5 Sec. (12.2-14.9)   08/08/22  14:49    


 


INR  0.91  (0.87-1.13)   08/08/22  14:49    


 


APTT  33.8 Sec. (24.2-36.6)   08/08/22  14:49    


 


D-Dimer  1200.26 ng/mlDDU (0-234)  H  08/11/22  06:22    


 


Sodium  141 mmol/L (137-145)   08/11/22  06:22    


 


Potassium  3.9 mmol/L (3.6-5.0)   08/11/22  06:22    


 


Chloride  101.5 mmol/L ()   08/11/22  06:22    


 


Carbon Dioxide  30 mmol/L (22-30)   08/11/22  06:22    


 


Anion Gap  13 mmol/L  08/11/22  06:22    


 


BUN  50 mg/dL (7-17)  H  08/11/22  06:22    


 


Creatinine  3.4 mg/dL (0.6-1.2)  H  08/11/22  06:22    


 


Estimated GFR  14 ml/min  08/11/22  06:22    


 


BUN/Creatinine Ratio  15 %  08/11/22  06:22    


 


Glucose  99 mg/dL ()   08/11/22  06:22    


 


POC Glucose  81 mg/dL ()   08/15/22  08:20    


 


Calcium  8.2 mg/dL (8.4-10.2)  L  08/11/22  06:22    


 


Ferritin  354.3 ng/mL (10.0-200.0)  H  08/11/22  06:22    


 


Total Bilirubin  0.20 mg/dL (0.1-1.2)   08/08/22  14:49    


 


AST  26 units/L (5-40)   08/08/22  14:49    


 


ALT  14 units/L (7-56)   08/08/22  14:49    


 


Alkaline Phosphatase  70 units/L ()   08/08/22  14:49    


 


Lactate Dehydrogenase  211 units/L ()  H  08/11/22  06:22    


 


Troponin T  0.075 ng/mL (0.00-0.029)  H  08/08/22  14:49    


 


C-Reactive Protein  2.10 mg/dL (0.00-1.30)  H  08/11/22  06:22    


 


NT-Pro-B Natriuret Pep  43202 pg/mL (0-900)  H  08/08/22  14:49    


 


Total Protein  5.6 g/dL (6.3-8.2)  L  08/08/22  14:49    


 


Albumin  2.8 g/dL (3.9-5)  L  08/08/22  14:49    


 


Albumin/Globulin Ratio  1.0 %  08/08/22  14:49    


 


Triglycerides  128 mg/dL (2-149)   08/08/22  14:49    


 


Cholesterol  154 mg/dL ()   08/08/22  14:49    


 


LDL Cholesterol Direct  63 mg/dL ()   08/08/22  14:49    


 


HDL Cholesterol  64 mg/dL (40-59)  H  08/08/22  14:49    


 


Cholesterol/HDL Ratio  2.40 %  08/08/22  14:49    


 


Nasal Screen MRSA (PCR)  Negative  (Negative)   08/09/22  02:09    


 


Coronavirus (PCR)  Positive  (Negative)  A  08/10/22  10:00    


 


SARS-CoV-2 (PCR)  Positive  (Negative)  A  08/15/22  12:40    


 


Hepatitis A IgM Ab  Non-reactive  (NonReactive)   08/09/22  10:27    


 


Hep Bs Antigen  Non-reactive  (Negative)   08/09/22  10:27    


 


Hep B Core IgM Ab  Non-reactive  (NonReactive)   08/09/22  10:27    


 


Hepatitis C Antibody  Non-reactive  (NonReactive)   08/09/22  10:27    











Chang/IV: 


                                        





Voiding Method                   Bedpan











Active Medications





- Current Medications


Current Medications: 














Generic Name Dose Route Start Last Admin





  Trade Name Freq  PRN Reason Stop Dose Admin


 


Acetaminophen  650 mg  08/08/22 23:21  08/16/22 18:43





  Acetaminophen 325 Mg Tab  PO   650 mg





  Q4H PRN   Administration





  Pain MILD(1-3)/Fever >100.5/HA  


 


Albuterol  2.5 mg  08/08/22 23:21 





  Albuterol 2.5 Mg/3 Ml Nebu  IH  





  Q3HRT PRN  





  Shortness Of Breath  


 


Ascorbic Acid  500 mg  08/09/22 10:00  08/16/22 10:03





  Ascorbic Acid 500 Mg Tab  PO   500 mg





  DAILY BERHANE   Administration


 


Carvedilol  12.5 mg  08/09/22 10:00  08/16/22 21:23





  Carvedilol 12.5 Mg Tab  PO   12.5 mg





  BID BERHANE   Administration


 


Dexamethasone  8 mg  08/15/22 10:00  08/16/22 10:03





  Dexamethasone 4 Mg Tab  PO  08/17/22 10:01  8 mg





  DAILY BERHANE   Administration


 


Epoetin Diony-epbx  20,000 unit  08/11/22 14:10  08/11/22 17:20





  Epoetin Diony-Epbx 20,000 Unit/1 Ml Vial  IV   20,000 unit





  NASH PRN   Administration





  hemodialysis  


 


Famotidine  20 mg  08/09/22 10:00  08/16/22 10:03





  Famotidine 20 Mg Tab  PO   20 mg





  DAILY BERHANE   Administration


 


Ferrous Sulfate  325 mg  08/09/22 10:00  08/16/22 10:03





  Ferrous Sulfate 325 Mg Tab  PO   325 mg





  DAILY BERHANE   Administration


 


Guaifenesin  200 mg  08/09/22 18:23  08/15/22 06:11





  Guaifenesin 100 Mg/5 Ml Oral Liqd  PO   200 mg





  Q4H PRN   Administration





  Cough  


 


Heparin Sodium (Porcine)  5,000 unit  08/09/22 06:00  08/17/22 05:17





  Heparin 5,000 Unit/1 Ml Vial  SUB-Q   5,000 unit





  Q8HR BERHANE   Administration


 


Sodium Chloride  100 mls @ 999 mls/hr  08/09/22 10:30 





  Nacl 0.9%  IV  





  NASH PRN  





  Hypotension  


 


Levothyroxine Sodium  200 mcg  08/09/22 06:00  08/17/22 05:17





  Levothyroxine 100 Mcg Tab  PO   200 mcg





  QAM@0600 BERHANE   Administration


 


Morphine Sulfate  2 mg  08/08/22 23:21 





  Morphine 2 Mg/1 Ml Inj  IV  





  Q4H PRN  





  Pain, Moderate (4-6)  


 


Morphine Sulfate  4 mg  08/08/22 23:21 





  Morphine 4 Mg/1 Ml Inj  IV  





  Q4H PRN  





  Pain , Severe (7-10)  


 


Nifedipine  60 mg  08/13/22 10:00  08/16/22 21:23





  Nifedipine Xl 60 Mg Tab  PO   60 mg





  Q12HR BERHANE   Administration


 


Ondansetron HCl  4 mg  08/08/22 23:21 





  Ondansetron 4 Mg/2 Ml Inj  IV  





  Q8H PRN  





  Nausea And Vomiting  


 


Sevelamer Carbonate  800 mg  08/09/22 08:00  08/16/22 17:44





  Sevelamer Carbonate 800 Mg Tab  PO   800 mg





  TIDWM BERHANE   Administration


 


Sodium Chloride  10 ml  08/09/22 10:00  08/16/22 21:23





  Sodium Chloride 0.9% 10 Ml Flush Syringe  IV   10 ml





  BID BERHANE   Administration


 


Sodium Chloride  10 ml  08/08/22 23:21 





  Sodium Chloride 0.9% 10 Ml Flush Syringe  IV  





  PRN PRN  





  LINE FLUSH  














Nutrition/Malnutrition Assess





- Dietary Evaluation


Nutrition/Malnutrition Findings: 


                                        





Nutrition Notes                                            Start:  08/09/22 

12:04


Freq:                                                      Status: Active       




Protocol:                                                                       




 Document     08/16/22 09:59  [place]  (Rec: 08/16/22 10:17  [place]  [id number])


 Nutrition Notes


     Initial or Follow up                        Reassessment


     Current Diagnosis                           CKD (stage V CKD),Hypertension


                                                 ,Respiratory Failure,


                                                 Malnutrition


     Other Pertinent Diagnosis                   ESRD+HD, s/p COVID-19/


                                                 Pneumonia, Anemia,


                                                 Hypothyroidism.


     Current Diet                                Cardiac Diet (since B 08/08),


                                                 Cardiac -Renal- Diet+D Suppl (


                                                 from D 08/09).


     Labs/Tests                                  08/16: BUN 50, Crea 3.4, Ca 8.


                                                 2.


     Pertinent Medications                       08/16: Vit C, FeSO4,


                                                 Levothyroxine, Renvela, others


                                                 nutritionally unremarkable.


     Height                                      5 ft 3 in


     Weight                                      72.3 kg


     Ideal Body Weight (kg)                      52.27


     BMI                                         28.2


     Weight change and time frame                0.275 Body weight loss


                                                 reported in 1 week.


     Weight Status                               Overweight


     Subjective/Other Information                RD consult for routine F/U on


                                                 dietary advancement.


                                                 Diet continues as prescribed,


                                                 Pt's PO intake of meals has


                                                 been Good (100%) and well


                                                 tolerated, according to ADL


                                                 notes.


                                                 Pt is on Nasal Cannula, O2


                                                 saturation @ 98%, according to


                                                 Physical Assessment History


                                                 notes.


                                                 Pt shows an unspecified area


                                                 of concern for skin risk at


                                                 the time, according to


                                                 Physical Assessment History


                                                 notes.


                                                 Plans for discharge on 08/16,


                                                 as Pt is medically cleared,


                                                 according to Progress notes.


     Percent of energy/protein needs met:        Prescribed Cardiac -Renal-


                                                 Diet provides for energy/


                                                 protein needs (2,230 Kcal/85 g


                                                 ) during LOS; additionally,


                                                 Dietary Supplements will


                                                 compensate for possible poor


                                                 or insufficient PO intake of


                                                 meals with 850 Kcal and 38 g


                                                 of protein.


     Burn                                        Absent


     Trauma                                      Absent


     GI Symptoms                                 None


     Food Allergy                                No


     Skin Integrity/Comment                      Unspecified area of concern.


     Current % PO                                Good (%)


     Minimum of two criteria                     No


     Fluid Accumulation                          N/A


     Reduced  Strength                       N/A (non-severe)


     Protein-Calorie Malnutrition                N\A


     #2


      Nutrition Diagnosis                        Altered nutrition-related


                                                 laboratory values


      Comments:                                  08/16: BUN 50, Crea 3.4, Ca 8.


                                                 2.


      Diagnosis Progress(for reassessment        Continues


       documentation)                            


     #1


      Nutrition Diagnosis                        Predicted suboptimal energy


                                                 intake


      Comments:                                  Diet continues as prescribed,


                                                 Pt's PO intake of meals has


                                                 been Good (100%) and well


                                                 tolerated, according to ADL


                                                 notes.


      Diagnosis Progress(for reassessment        Resolved


       documentation)                            


     Is patient on ventilator?                   No


     Is Patient Ambulatory and/or Out of Bed     No


     [facility]-([facility]-confined to bed)      [phone]


     Kcal/Kg value to use for calculation        19


     Approximate Energy Requirements Using       1374


      kcal/Kg                                    


     Calculation Used for Recommendations        Kcal/kg


     Additional Notes                            Protein: >1.2 g/Kg ABW; >88 g/


                                                 day.


                                                 Fluids: 1 ml/Kcal, or as per


                                                 MD.


 Nutrition Intervention


     Change Diet Order:                          Continue Renal restriction to


                                                 Cardiac Diet as tolerated.


     Add Supplement/Snack (indicate name/kcal    Discontinue.


      /protein )                                 


     Goal #1                                     Help reach and maintain


                                                 acceptable chemistry lab


                                                 values during LOS.


     Goal #2                                     Adjust the dietary


                                                 intervention to better serve


                                                 Pt's needs and clinical


                                                 conditions during LOS.


     Follow-Up By:                               08/23/22


     Additional Comments                         Continue monitoring food


                                                 tolerance, %PO intake of meals


                                                 , and BM.

## 2022-08-17 NOTE — PROGRESS NOTE
Assessment and Plan





60 y/o with acute respiratory failure secondary to COVID 19





8/17/22:  Continue to wean oxygen while awaiting facility bed.





8/16/22:  Patient will be discharged today.  Oxygen weaning and assessment for 

home can be determined at next facility.   Steroids for 10 days.





8/15/22:  10 days total of dex.  Continue to prone at home.  Suggested walk test

prior to discharge to evaluate oxygen needs.





8/14/22:  Wean FiO2 to off.  Steroids for 10 days.  Prone daily and sleep prone.





8/13/22:  Continue to wean FiO2 to off for sats > 88% as patient is not on 

oxygen at home.  Steroids.  Prone daily and sleep prone at night if possible.  

Will continue to follow.





8/12/22:  HD really hepled with volume removal.  Should get HD again tomorrow.  

Same recs regarding proning.  Patient not on O2 at home so will need walk test 

prior to discharge.





8/11/22:  Continue steroids.  Actemra ordered by ID.  PRone if possible during 

the day and sleep prone at night.  Needs volume removal with HD, based on 

vitals, BP should tolerate it.  Guarded prognosis.





1.  Dexamethasone as ordered


2.  Agree with ID and use of actemra


3. If able to prone, suggest prone as tolerated during the day and sleep prone 

at night


4.  Wean FiO2 and flow for sats >88%


5.  HD per renal, need to keep daily net negative state if possible


6.  Guarded prognosis.





Subjective


Date of service: 08/17/22


Principal diagnosis: Covid pneumonia


Interval history: 





Still no bed available.  Stable on 3 liters with very good sats.





Objective


                               Vital Signs - 12hr











  08/16/22 08/17/22 08/17/22





  23:06 05:54 08:23


 


Temperature 97.8 F 98.1 F 


 


Pulse Rate 74 76 


 


Respiratory 20 18 





Rate   


 


Blood Pressure 126/54 109/62 


 


O2 Sat by Pulse 93 98 99





Oximetry   











CBC and BMP: 


                                 08/11/22 06:22





                                 08/11/22 06:22


ABG, PT/INR, D-dimer: 


PT/INR, D-dimer











PT  13.5 Sec. (12.2-14.9)   08/08/22  14:49    


 


INR  0.91  (0.87-1.13)   08/08/22  14:49    


 


D-Dimer  1200.26 ng/mlDDU (0-234)  H  08/11/22  06:22    








Abnormal lab findings: 


                                  Abnormal Labs











  08/08/22 08/08/22 08/08/22





  14:49 14:49 14:49


 


RBC  3.08 L  


 


Hgb  9.6 L  


 


Hct   


 


MCV  99 H  


 


RDW  17.7 H  


 


Lymph % (Auto)   


 


Lymph # (Auto)   


 


Seg Neutrophils %   


 


Seg Neuts % (Manual)  78.0 H  


 


Lymphocytes % (Manual)  3.0 L  


 


Lymphocytes # (Manual)  0.3 L  


 


D-Dimer   


 


Sodium   133 L 


 


Carbon Dioxide   21 L 


 


BUN   39 H 


 


Creatinine   3.7 H 


 


Glucose   


 


Calcium   8.2 L 


 


Ferritin   


 


Lactate Dehydrogenase   


 


Troponin T    0.075 H


 


C-Reactive Protein   


 


NT-Pro-B Natriuret Pep    95897 H


 


Total Protein   5.6 L 


 


Albumin   2.8 L 


 


HDL Cholesterol    64 H


 


Coronavirus (PCR)   


 


SARS-CoV-2 (PCR)   














  08/09/22 08/09/22 08/09/22





  08:04 08:04 08:04


 


RBC   


 


Hgb   


 


Hct   


 


MCV   


 


RDW   


 


Lymph % (Auto)   


 


Lymph # (Auto)   


 


Seg Neutrophils %   


 


Seg Neuts % (Manual)   


 


Lymphocytes % (Manual)   


 


Lymphocytes # (Manual)   


 


D-Dimer   1044.30 H 


 


Sodium   


 


Carbon Dioxide   


 


BUN  48 H  


 


Creatinine  4.0 H  


 


Glucose  147 H  


 


Calcium   


 


Ferritin    458.0 H


 


Lactate Dehydrogenase   


 


Troponin T   


 


C-Reactive Protein  10.50 H  


 


NT-Pro-B Natriuret Pep   


 


Total Protein   


 


Albumin   


 


HDL Cholesterol   


 


Coronavirus (PCR)   


 


SARS-CoV-2 (PCR)   














  08/10/22 08/10/22 08/10/22





  07:21 07:21 10:00


 


RBC  2.83 L  


 


Hgb  8.8 L  


 


Hct  27.9 L  


 


MCV  98 H  


 


RDW  17.2 H  


 


Lymph % (Auto)   


 


Lymph # (Auto)   


 


Seg Neutrophils %   


 


Seg Neuts % (Manual)  93.0 H  


 


Lymphocytes % (Manual)  6.0 L  


 


Lymphocytes # (Manual)  0.5 L  


 


D-Dimer   


 


Sodium   


 


Carbon Dioxide   31 H 


 


BUN   29 H 


 


Creatinine   2.6 H 


 


Glucose   


 


Calcium   8.2 L 


 


Ferritin   


 


Lactate Dehydrogenase   


 


Troponin T   


 


C-Reactive Protein   


 


NT-Pro-B Natriuret Pep   


 


Total Protein   


 


Albumin   


 


HDL Cholesterol   


 


Coronavirus (PCR)    Positive A


 


SARS-CoV-2 (PCR)   














  08/11/22 08/11/22 08/11/22





  06:22 06:22 06:22


 


RBC  2.65 L  


 


Hgb  8.2 L  


 


Hct  25.9 L  


 


MCV  98 H  


 


RDW  16.7 H  


 


Lymph % (Auto)  5.9 L  


 


Lymph # (Auto)  0.3 L  


 


Seg Neutrophils %  89.9 H  


 


Seg Neuts % (Manual)   


 


Lymphocytes % (Manual)   


 


Lymphocytes # (Manual)   


 


D-Dimer   1200.26 H 


 


Sodium   


 


Carbon Dioxide   


 


BUN    50 H


 


Creatinine    3.4 H


 


Glucose   


 


Calcium    8.2 L


 


Ferritin   


 


Lactate Dehydrogenase    211 H


 


Troponin T   


 


C-Reactive Protein    2.10 H


 


NT-Pro-B Natriuret Pep   


 


Total Protein   


 


Albumin   


 


HDL Cholesterol   


 


Coronavirus (PCR)   


 


SARS-CoV-2 (PCR)   














  08/11/22 08/15/22





  06:22 12:40


 


RBC  


 


Hgb  


 


Hct  


 


MCV  


 


RDW  


 


Lymph % (Auto)  


 


Lymph # (Auto)  


 


Seg Neutrophils %  


 


Seg Neuts % (Manual)  


 


Lymphocytes % (Manual)  


 


Lymphocytes # (Manual)  


 


D-Dimer  


 


Sodium  


 


Carbon Dioxide  


 


BUN  


 


Creatinine  


 


Glucose  


 


Calcium  


 


Ferritin  354.3 H 


 


Lactate Dehydrogenase  


 


Troponin T  


 


C-Reactive Protein  


 


NT-Pro-B Natriuret Pep  


 


Total Protein  


 


Albumin  


 


HDL Cholesterol  


 


Coronavirus (PCR)  


 


SARS-CoV-2 (PCR)   Positive A

## 2022-08-18 VITALS — DIASTOLIC BLOOD PRESSURE: 70 MMHG | SYSTOLIC BLOOD PRESSURE: 142 MMHG

## 2022-08-18 PROCEDURE — 5A1D70Z PERFORMANCE OF URINARY FILTRATION, INTERMITTENT, LESS THAN 6 HOURS PER DAY: ICD-10-PCS | Performed by: INTERNAL MEDICINE

## 2022-08-18 RX ADMIN — SEVELAMER CARBONATE SCH: 800 TABLET, FILM COATED ORAL at 13:35

## 2022-08-18 RX ADMIN — Medication SCH ML: at 21:49

## 2022-08-18 RX ADMIN — NIFEDIPINE SCH MG: 60 TABLET, EXTENDED RELEASE ORAL at 10:08

## 2022-08-18 RX ADMIN — HEPARIN SODIUM SCH UNIT: 5000 INJECTION, SOLUTION INTRAVENOUS; SUBCUTANEOUS at 21:49

## 2022-08-18 RX ADMIN — SEVELAMER CARBONATE SCH MG: 800 TABLET, FILM COATED ORAL at 10:08

## 2022-08-18 RX ADMIN — FAMOTIDINE SCH MG: 20 TABLET ORAL at 10:08

## 2022-08-18 RX ADMIN — Medication SCH ML: at 10:08

## 2022-08-18 RX ADMIN — FERROUS SULFATE TAB 325 MG (65 MG ELEMENTAL FE) SCH MG: 325 (65 FE) TAB at 10:08

## 2022-08-18 RX ADMIN — HEPARIN SODIUM SCH: 5000 INJECTION, SOLUTION INTRAVENOUS; SUBCUTANEOUS at 14:00

## 2022-08-18 RX ADMIN — OXYCODONE HYDROCHLORIDE AND ACETAMINOPHEN SCH MG: 500 TABLET ORAL at 10:08

## 2022-08-18 RX ADMIN — SEVELAMER CARBONATE SCH MG: 800 TABLET, FILM COATED ORAL at 16:25

## 2022-08-18 RX ADMIN — HEPARIN SODIUM SCH UNIT: 5000 INJECTION, SOLUTION INTRAVENOUS; SUBCUTANEOUS at 05:43

## 2022-08-18 RX ADMIN — NIFEDIPINE SCH MG: 60 TABLET, EXTENDED RELEASE ORAL at 21:48

## 2022-08-18 RX ADMIN — EPOETIN ALFA-EPBX PRN UNIT: 20000 INJECTION, SOLUTION INTRAVENOUS; SUBCUTANEOUS at 21:30

## 2022-08-18 RX ADMIN — LEVOTHYROXINE SODIUM SCH MCG: 0.1 TABLET ORAL at 05:43

## 2022-08-18 RX ADMIN — ACETAMINOPHEN PRN MG: 325 TABLET ORAL at 16:25

## 2022-08-18 NOTE — DISCHARGE SUMMARY
Providers





- Providers


Date of Admission: 


08/08/22 22:37





Date of discharge: 08/18/22


Attending physician: 


WILI CMAPOS MD





                                        





08/08/22 23:21


Consult to Physician [CONS] Routine 


   Comment: 


   Consulting Provider: BEVERLEY CHOW


   Physician Instructions: 


   Reason For Exam: covid





08/09/22 09:42


Consult to Physician [CONS] Routine 


   Comment: 


   Consulting Provider: EDMOND TAYLOR


   Physician Instructions: 


   Reason For Exam: ESRD management





08/09/22 12:42


Consult to Physician [CONS] Routine 


   Comment: 


   Consulting Provider: ALECIA FRANCIS


   Physician Instructions: 


   Reason For Exam: HFNC management + COVID pneumonia











Primary care physician: 


GENEVA BLAS








Hospitalization


Reason for admission: Acute hypoxic respiratory failure, COVID-19 pneumonia


Condition: Serious


Hospital course: 





Patient is a 61-year-old female past medical history of ESRD on hemodialysis, 

hypertension, hypothyroidism, and mild protein caloric malnutrition who 

presented to the ED with complaints of shortness of breath that had started 

approximately 2 days prior.  The patient denied any chest pain, fever, chills, 

extended travel, or sick contacts.  The patient did endorse that exertion 

worsens her dyspnea.  Patient was placed on nonrebreather at the nursing home 

prior to presentation, and it was there that she was found to be confirmed 

positive for COVID-19.  In the ED, the patient was hemodynamically stable and 

saturating 94% on room air.  Patient's labs were relatively unremarkable.  

Patient was admitted for management of COVID-19 pneumonia with acute hypoxic 

respiratory failure.  The patient at 1 point required high flow nasal cannula.  

She was started on daily steroids and azithromycin 250 mg daily x5 days.  

Pulmonology was consulted for further management.  Nephrology was consulted for 

management of hemodialysis needs.  Patient is since been weaned down to 2 L 

nasal cannula, the patient is hemodynamically stable.  The patient is medically 

clear for discharge.


Disposition: [facility]


Final Discharge Diagnosis (Prints w/discharge instructions): COVID-19 pneumonia,

 acute hypoxic respiratory failure, acute heart failure ruled out, elevated D-

dimer, ESRD on hemodialysis, macrocytic anemia, hypothyroidism, mild protein 

caloric malnutrition


Time spent for discharge: 40 minutes





Core Measure Documentation





- Palliative Care


Palliative Care/ Comfort Measures: Not Applicable





- Core Measures


Any of the following diagnoses?: none





Exam





- Physical Exam


Narrative exam: 





GENERAL: Well-developed well-nourished. In no acute distress.


HEENT: NC@2LPM


NECK: R sided permacath.


CHEST/LUNGS: CTAB on room air


HEART/CARDIOVASCULAR: RRR. No murmur, rubs or gallops appreciated.


ABDOMEN: +BS. NT/ND.


SKIN: No rashes noted.


NEURO:  No focal motor deficit.  Follows all commands.


MUSCULOSKELETAL: No joint effusion 


EXTREMITIES: No cyanosis, clubbing or edema.


PSYCH: Cooperative.





- Constitutional


Vitals: 


                                        











Temp Pulse Resp BP Pulse Ox


 


 98.6 F   71   18   137/60   100 


 


 08/18/22 05:45  08/18/22 05:45  08/18/22 05:45  08/18/22 05:45  08/18/22 08:30














Plan


Care Plan Goals: 


Patient is medically cleared for discharge. 


Assessment: 


Patient is a 61-year-old female past medical history of ESRD on hemodialysis, 

hypertension, hypothyroidism, and mild protein caloric malnutrition who 

presented to the ED with complaints of shortness of breath that had started 

approximately 2 days prior.  The patient denied any chest pain, fever, chills, 

extended travel, or sick contacts.  The patient did endorse that exertion 

worsens her dyspnea.  Patient was placed on nonrebreather at the nursing home 

prior to presentation, and it was there that she was found to be confirmed 

positive for COVID-19.  In the ED, the patient was hemodynamically stable and 

saturating 94% on room air.  Patient's labs were relatively unremarkable.  

Patient was admitted for management of COVID-19 pneumonia with acute hypoxic 

respiratory failure.  The patient at 1 point required high flow nasal cannula.  

She was started on daily steroids and azithromycin 250 mg daily x5 days.  

Pulmonology was consulted for further management.  Nephrology was consulted for 

management of hemodialysis needs.  Patient is since been weaned down to 2 L 

nasal cannula, the patient is hemodynamically stable.  The patient is medically 

clear for discharge.


Follow up with: 


GENEVA BLAS MD [Primary Care Provider] - 3-5 Days


Prescriptions: 


NIFEdipine XL [Procardia Xl] 60 mg PO Q12HR #60 tablet


Benzonatate [Tessalon Perles] 100 mg PO Q8HR PRN #30 cap


 PRN Reason: Cough

## 2022-08-18 NOTE — PROGRESS NOTE
Assessment and Plan


This is a 61 year old woman who presents with dyspnea, COVID-19





# ESRD: continue HD Tu/Th/Sa


- daily labs


- renally dose meds


- avoid nephrotoxins


- renal diet


- verbal consent obtained for HD





# Anemia: ESAs with HD TIW  





# HTN: UF as tolerated, aim net negative as able.  BP stable





# Secondary Hyperparathyroidism: continue home binders as needed, vitamin D 

analogs prn





# COVID-19 Pneumonia, Respiratory Failure: improving, pulmonary/ID also 

following





# CHF: note BNP 13K on admission, UF as tolerated, goal net negative











Subjective


Date of service: 08/18/22


Principal diagnosis: Covid pneumonia


Interval history: 


Vitals, labs, I/O reviewed.








Objective





- Exam


Narrative Exam: 


Covid positive, exam deferred for PPE conservation, primary team exam reviewed.








- Vital Signs


Vital signs: 


                               Vital Signs - 12hr











  08/18/22 08/18/22 08/18/22





  05:45 08:06 08:30


 


Temperature 98.6 F  


 


Pulse Rate 71  


 


Respiratory 18  





Rate   


 


Blood Pressure 137/60  


 


O2 Sat by Pulse 100 98 100





Oximetry   














  08/18/22





  11:01


 


Temperature 98.0 F


 


Pulse Rate 77


 


Respiratory 16





Rate 


 


Blood Pressure 118/62


 


O2 Sat by Pulse 98





Oximetry 














- Lab





                                 08/11/22 06:22





                                 08/17/22 11:26


                             Most recent lab results











Calcium  8.1 mg/dL (8.4-10.2)  L  08/17/22  11:26    














Medications & Allergies





- Medications


Allergies/Adverse Reactions: 


                                    Allergies





NSAIDS (Non-Steroidal Anti-Inflamma Allergy (Verified 08/08/22 13:30)


   Rash


Sulfa (Sulfonamide Antibiotics) Allergy (Verified 08/08/22 13:30)


   Rash








Home Medications: 


                                Home Medications











 Medication  Instructions  Recorded  Confirmed  Last Taken  Type


 


Ascorbic Acid [Vitamin C] 500 mg PO DAILY 08/08/22 08/08/22 Unknown History


 


Ferrous Sulfate [Ferrous Sulfate 324 mg PO DAILY 08/08/22 08/08/22 Unknown 

History





324 MG]     


 


Levothyroxine Sodium 200 mcg PO DAILY 08/08/22 08/08/22 Unknown History





[Levothyroxine]     


 


Venlafaxine [Effexor 37.5mg tab] 37.5 mg PO QDAY 08/08/22 08/08/22 Unknown 

History


 


carvediloL [Coreg] 12.5 mg PO BID 08/08/22 08/08/22 Unknown History


 


sevelamer HCL [Sevelamer HCl] 800 mg PO TID 08/08/22 08/08/22 Unknown History


 


Benzonatate [Tessalon Perles] 100 mg PO Q8HR PRN #30 cap 08/15/22  Unknown Rx


 


NIFEdipine XL [Procardia Xl] 60 mg PO Q12HR #60 tablet 08/15/22  Unknown Rx











Active Medications: 














Generic Name Dose Route Start Last Admin





  Trade Name Freq  PRN Reason Stop Dose Admin


 


Acetaminophen  650 mg  08/08/22 23:21  08/17/22 21:28





  Acetaminophen 325 Mg Tab  PO   650 mg





  Q4H PRN   Administration





  Pain MILD(1-3)/Fever >100.5/HA  


 


Albuterol  2.5 mg  08/08/22 23:21 





  Albuterol 2.5 Mg/3 Ml Nebu  IH  





  Q3HRT PRN  





  Shortness Of Breath  


 


Ascorbic Acid  500 mg  08/09/22 10:00  08/18/22 10:08





  Ascorbic Acid 500 Mg Tab  PO   500 mg





  DAILY BERHANE   Administration


 


Carvedilol  12.5 mg  08/09/22 10:00  08/18/22 10:08





  Carvedilol 12.5 Mg Tab  PO   12.5 mg





  BID BERHANE   Administration


 


Epoetin Diony-epbx  20,000 unit  08/11/22 14:10  08/11/22 17:20





  Epoetin Diony-Epbx 20,000 Unit/1 Ml Vial  IV   20,000 unit





  NASH PRN   Administration





  hemodialysis  


 


Famotidine  20 mg  08/09/22 10:00  08/18/22 10:08





  Famotidine 20 Mg Tab  PO   20 mg





  DAILY BERHANE   Administration


 


Ferrous Sulfate  325 mg  08/09/22 10:00  08/18/22 10:08





  Ferrous Sulfate 325 Mg Tab  PO   325 mg





  DAILY BERHANE   Administration


 


Guaifenesin  200 mg  08/09/22 18:23  08/15/22 06:11





  Guaifenesin 100 Mg/5 Ml Oral Liqd  PO   200 mg





  Q4H PRN   Administration





  Cough  


 


Heparin Sodium (Porcine)  5,000 unit  08/09/22 06:00  08/18/22 05:43





  Heparin 5,000 Unit/1 Ml Vial  SUB-Q   5,000 unit





  Q8HR BERHANE   Administration


 


Sodium Chloride  100 mls @ 999 mls/hr  08/09/22 10:30 





  Nacl 0.9%  IV  





  NASH PRN  





  Hypotension  


 


Levothyroxine Sodium  200 mcg  08/09/22 06:00  08/18/22 05:43





  Levothyroxine 100 Mcg Tab  PO   200 mcg





  QAM@0600 BERHANE   Administration


 


Morphine Sulfate  2 mg  08/08/22 23:21 





  Morphine 2 Mg/1 Ml Inj  IV  





  Q4H PRN  





  Pain, Moderate (4-6)  


 


Morphine Sulfate  4 mg  08/08/22 23:21 





  Morphine 4 Mg/1 Ml Inj  IV  





  Q4H PRN  





  Pain , Severe (7-10)  


 


Nifedipine  60 mg  08/13/22 10:00  08/18/22 10:08





  Nifedipine Xl 60 Mg Tab  PO   60 mg





  Q12HR BERHANE   Administration


 


Ondansetron HCl  4 mg  08/08/22 23:21 





  Ondansetron 4 Mg/2 Ml Inj  IV  





  Q8H PRN  





  Nausea And Vomiting  


 


Sevelamer Carbonate  800 mg  08/09/22 08:00  08/18/22 10:08





  Sevelamer Carbonate 800 Mg Tab  PO   800 mg





  TIDWM BERHANE   Administration


 


Sodium Chloride  10 ml  08/09/22 10:00  08/18/22 10:08





  Sodium Chloride 0.9% 10 Ml Flush Syringe  IV   10 ml





  BID BERHANE   Administration


 


Sodium Chloride  10 ml  08/08/22 23:21 





  Sodium Chloride 0.9% 10 Ml Flush Syringe  IV  





  PRN PRN  





  LINE FLUSH

## 2022-08-22 ENCOUNTER — HOSPITAL ENCOUNTER (INPATIENT)
Dept: HOSPITAL 5 - ED | Age: 62
LOS: 3 days | Discharge: NURSING, REHAB OR LONG-TERM CARE | DRG: 640 | End: 2022-08-25
Attending: INTERNAL MEDICINE | Admitting: HOSPITALIST
Payer: MEDICARE

## 2022-08-22 DIAGNOSIS — U07.1: ICD-10-CM

## 2022-08-22 DIAGNOSIS — E03.9: ICD-10-CM

## 2022-08-22 DIAGNOSIS — D63.1: ICD-10-CM

## 2022-08-22 DIAGNOSIS — Z82.49: ICD-10-CM

## 2022-08-22 DIAGNOSIS — E87.2: ICD-10-CM

## 2022-08-22 DIAGNOSIS — Z99.2: ICD-10-CM

## 2022-08-22 DIAGNOSIS — E87.70: Primary | ICD-10-CM

## 2022-08-22 DIAGNOSIS — E87.5: ICD-10-CM

## 2022-08-22 DIAGNOSIS — I13.2: ICD-10-CM

## 2022-08-22 DIAGNOSIS — I50.9: ICD-10-CM

## 2022-08-22 DIAGNOSIS — N18.6: ICD-10-CM

## 2022-08-22 DIAGNOSIS — E66.01: ICD-10-CM

## 2022-08-22 DIAGNOSIS — Z88.8: ICD-10-CM

## 2022-08-22 DIAGNOSIS — N25.81: ICD-10-CM

## 2022-08-22 DIAGNOSIS — Z88.2: ICD-10-CM

## 2022-08-22 LAB
BASOPHILS # (AUTO): 0.1 K/MM3 (ref 0–0.1)
BASOPHILS NFR BLD AUTO: 1.4 % (ref 0–1.8)
BUN SERPL-MCNC: 93 MG/DL (ref 7–17)
BUN/CREAT SERPL: 17 %
CALCIUM SERPL-MCNC: 8.4 MG/DL (ref 8.4–10.2)
EOSINOPHIL # BLD AUTO: 0.1 K/MM3 (ref 0–0.4)
EOSINOPHIL NFR BLD AUTO: 0.7 % (ref 0–4.3)
HCT VFR BLD CALC: 25.3 % (ref 30.3–42.9)
HEMOLYSIS INDEX: 8
HGB BLD-MCNC: 8.2 GM/DL (ref 10.1–14.3)
LYMPHOCYTES # BLD AUTO: 1 K/MM3 (ref 1.2–5.4)
LYMPHOCYTES NFR BLD AUTO: 10.3 % (ref 13.4–35)
MCHC RBC AUTO-ENTMCNC: 32 % (ref 30–34)
MCV RBC AUTO: 97 FL (ref 79–97)
MONOCYTES # (AUTO): 0.8 K/MM3 (ref 0–0.8)
MONOCYTES % (AUTO): 8.7 % (ref 0–7.3)
PLATELET # BLD: 235 K/MM3 (ref 140–440)
RBC # BLD AUTO: 2.61 M/MM3 (ref 3.65–5.03)

## 2022-08-22 PROCEDURE — 71045 X-RAY EXAM CHEST 1 VIEW: CPT

## 2022-08-22 PROCEDURE — 80048 BASIC METABOLIC PNL TOTAL CA: CPT

## 2022-08-22 PROCEDURE — 85025 COMPLETE CBC W/AUTO DIFF WBC: CPT

## 2022-08-22 PROCEDURE — 87641 MR-STAPH DNA AMP PROBE: CPT

## 2022-08-22 PROCEDURE — 36415 COLL VENOUS BLD VENIPUNCTURE: CPT

## 2022-08-22 PROCEDURE — U0003 INFECTIOUS AGENT DETECTION BY NUCLEIC ACID (DNA OR RNA); SEVERE ACUTE RESPIRATORY SYNDROME CORONAVIRUS 2 (SARS-COV-2) (CORONAVIRUS DISEASE [COVID-19]), AMPLIFIED PROBE TECHNIQUE, MAKING USE OF HIGH THROUGHPUT TECHNOLOGIES AS DESCRIBED BY CMS-2020-01-R: HCPCS

## 2022-08-22 PROCEDURE — 99285 EMERGENCY DEPT VISIT HI MDM: CPT

## 2022-08-22 NOTE — XRAY REPORT
CHEST 1 VIEW 



INDICATION / CLINICAL INFORMATION: COVID (+);  esrd on h/d, p/w cough missed h/d x 6d. 



COMPARISON: Chest x-ray 8/8/2022



FINDINGS:



SUPPORT DEVICES: Right-sided hemodialysis catheter stable in position tip near the cavoatrial junctio
n.

HEART / MEDIASTINUM: Heart size is within normal limits. Mediastinal contour demonstrates no signific
ant abnormality. 

LUNGS / PLEURA: Near total clearing of the right lung base. Left lung remains clear.  

BONES: No significant osseous abnormality.

ADDITIONAL FINDINGS: No significant additional findings.







IMPRESSION:

1. Minimal residual atelectasis/scarring within the right lung base. No active cardiopulmonary diseas
e.



Signer Name: Chau Shirley II, MD 

Signed: 8/22/2022 10:30 PM

Workstation Name: [id number]

## 2022-08-22 NOTE — EMERGENCY DEPARTMENT REPORT
HPI





- General


Chief Complaint: Recheck/Abnormal Lab/Rx


PUI?: No


Time Seen by Provider: 08/22/22 21:18





- HPI


HPI: 


61-year-old morbidly obese female with multiple medical comorbidities including 

end-stage renal disease on hemodialysis, Tuesday Thursday Saturday, presents 

stating she wants dialysis.  Patient states she is COVID-positive.  She tested 

+9 days ago and had a repeat test per her verbal report, today, and still tested

positive.  She denies any symptoms at this time.  She states that her rehab 

facility arranged for her to go to dialysis at a new facility that manages 

patients who are COVID-positive and need dialysis.  She states "however they 

said they could not dialyze me because they did not have the right paperwork."  

She denies any muscle aches chest pain shortness of breath difficulty breathing 

palpitations weakness lightheadedness dizziness or any other symptoms.  Pain 

currently 0 out of 10.  Patient states she does not know the last time she 

underwent hemodialysis.








ED Past Medical Hx





- Past Medical History


Hx Hypertension: Yes


Hx Congestive Heart Failure: No


Hx Diabetes: No


Hx Renal Disease: Yes


Hx Sickle Cell Disease: No


Hx Asthma: No


Hx COPD: No


Additional medical history: ESRD, Hypothyroidism





- Social History


Smoking Status: Unknown if ever smoked





- Medications


Home Medications: 


                                Home Medications











 Medication  Instructions  Recorded  Confirmed  Last Taken  Type


 


Ascorbic Acid [Vitamin C] 500 mg PO DAILY 08/08/22 08/08/22 Unknown History


 


Ferrous Sulfate [Ferrous Sulfate 324 mg PO DAILY 08/08/22 08/08/22 Unknown 

History





324 MG]     


 


Levothyroxine Sodium 200 mcg PO DAILY 08/08/22 08/08/22 Unknown History





[Levothyroxine]     


 


Venlafaxine [Effexor 37.5mg tab] 37.5 mg PO QDAY 08/08/22 08/08/22 Unknown 

History


 


carvediloL [Coreg] 12.5 mg PO BID 08/08/22 08/08/22 Unknown History


 


sevelamer HCL [Sevelamer HCl] 800 mg PO TID 08/08/22 08/08/22 Unknown History


 


Benzonatate [Tessalon Perles] 100 mg PO Q8HR PRN #30 cap 08/15/22  Unknown Rx


 


NIFEdipine XL [Procardia Xl] 60 mg PO Q12HR #60 tablet 08/15/22  Unknown Rx














ED Review of Systems


ROS: 


Stated complaint: DIALYSIS


Other details as noted in HPI





Comment: All other systems reviewed and negative





Physical Exam





- Physical Exam


Vital Signs: 


                                   Vital Signs











  08/22/22 08/22/22 08/22/22





  19:57 20:04 20:15


 


Temperature 98.8 F  


 


Pulse Rate 88  77


 


Respiratory 18 22 17





Rate   


 


Blood Pressure   100/44


 


Blood Pressure 96/62  





[Right]   


 


O2 Sat by Pulse  95 95





Oximetry   














  08/22/22 08/22/22 08/22/22





  20:31 20:44 20:45


 


Temperature  98.4 F 


 


Pulse Rate 82 78 78


 


Respiratory 21 21 20





Rate   


 


Blood Pressure 100/44 100/44 100/44


 


Blood Pressure   





[Right]   


 


O2 Sat by Pulse 95 97 94





Oximetry   











General: 





Gen: Obese middle-age female, lying in bed, comfortable appearing, no drooling 

no stridor no respiratory distress, breathing unlabored, nontoxic-appearing


HEENT: Normocephalic atraumatic pupils equally round and reactive to light 

extraocular muscles intact sclera anicteric


Neck: Full range of motion, no midline spinal tenderness palpation, no JVD, no 

carotid bruits, no nuchal rigidity


CVS: S1-S2 regular rate and rhythm with no gallops rubs or murmurs, chest wall 

nontender


Pulmonary: Clear to auscultation bilaterally, no wheezes rales or rhonchi


Abdomen: Soft nondistended nontender no guarding or rebound tenderness, no 

palpable deformities or step-offs, normal active bowel sounds, no 

hepatosplenomegaly, no pulsatile masses


: Deferred


Extremities: No cyanosis no clubbing no edema, intact distal peripheral pulses,


Integumentary: Skin normal, no petechia no purpura no abscess no lacerations no 

evidence of trauma no evidence of infection


Neuro: Patient is awake alert and oriented to person place time situation, 

mentating well, cranial nerves II through XII intact, no focal neurodeficits, 

sensation grossly tact


Psych: Calm cooperative, mood affect normal





ED Course


                                   Vital Signs











  08/22/22 08/22/22 08/22/22





  19:57 20:04 20:15


 


Temperature 98.8 F  


 


Pulse Rate 88  77


 


Respiratory 18 22 17





Rate   


 


Blood Pressure   100/44


 


Blood Pressure 96/62  





[Right]   


 


O2 Sat by Pulse  95 95





Oximetry   














  08/22/22 08/22/22 08/22/22





  20:31 20:44 20:45


 


Temperature  98.4 F 


 


Pulse Rate 82 78 78


 


Respiratory 21 21 20





Rate   


 


Blood Pressure 100/44 100/44 100/44


 


Blood Pressure   





[Right]   


 


O2 Sat by Pulse 95 97 94





Oximetry   














ED Medical Decision Making





- Lab Data


Result diagrams: 


                                 08/22/22 21:47





                                 08/22/22 21:47





- Radiology Data


Radiology results: report reviewed





- Medical Decision Making





61-year-old obese female with multiple medical comorbidities brought in by EMS 

from her rehab facility stating that she is needing hemodialysis after having 

missed dialysis for several days in the setting of being COVID-positive.  Vital 

signs stable.  Serum labs reviewed.  Patient has significant hyperuricemia and 

reports being symptomatic secondary to this.  Case reviewed with admitting 

hospitalist, .  He has agreed to accept the patient to inpatient service 

for admission and further management.


Critical care attestation.: 


If time is entered above; I have spent that time in minutes in the direct care 

of this critically ill patient, excluding procedure time.








ED Disposition


Clinical Impression: 


 Hyperuricemia, End-stage renal disease on hemodialysis





Disposition: 09 ADMITTED AS INPATIENT


Is pt being admited?: Yes


Does the pt Need Aspirin: No


Condition: Stable


Referrals: 


PRIMARY CARE,MD [Primary Care Provider] - 3-5 Days

## 2022-08-22 NOTE — HISTORY AND PHYSICAL REPORT
History of Present Illness


Date of examination: 08/22/22


Date of admission: 


08/22/22


Chief complaint: 





Missed hemodialysis


History of present illness: 





61-year-old morbidly obese female with multiple medical comorbidities including 

end-stage renal disease on hemodialysis, Tuesday Thursday Saturday, presents 

stating she wants dialysis.  Patient states she is COVID-positive.  She tested 

+9 days ago and had a repeat test per her verbal report, today, and still tested

positive.  She denies any symptoms at this time.  She states that her rehab 

facility arranged for her to go to dialysis at a new facility that manages 

patients who are COVID-positive and need dialysis.  She states "however they 

said they could not dialyze me because they did not have the right paperwork."  

She denies any muscle aches chest pain shortness of breath difficulty breathing 

palpitations weakness lightheadedness dizziness or any other symptoms.  Pain 

currently 0 out of 10.  Patient states she does not know the last time she 

underwent hemodialysis.








In the emergency room patient BUN is 93 and creatinine 5.4.  We are going to 

admit the patient reconsult nephrology for hemodialysis in the morning








Past History


Past Medical History: ESRD, hypertension, hypothyroidism, renal failure


Past Surgical History: No surgical history


Social history: no significant social history


Family history: hypertension





Medications and Allergies


                                    Allergies











Allergy/AdvReac Type Severity Reaction Status Date / Time


 


NSAIDS (Non-Steroidal Allergy  Rash Verified 08/08/22 13:30





Anti-Inflamma     


 


Sulfa (Sulfonamide Allergy  Rash Verified 08/08/22 13:30





Antibiotics)     











                                Home Medications











 Medication  Instructions  Recorded  Confirmed  Last Taken  Type


 


Ascorbic Acid [Vitamin C] 500 mg PO DAILY 08/08/22 08/08/22 Unknown History


 


Ferrous Sulfate [Ferrous Sulfate 324 mg PO DAILY 08/08/22 08/08/22 Unknown 

History





324 MG]     


 


Levothyroxine Sodium 200 mcg PO DAILY 08/08/22 08/08/22 Unknown History





[Levothyroxine]     


 


Venlafaxine [Effexor 37.5mg tab] 37.5 mg PO QDAY 08/08/22 08/08/22 Unknown Histo

ry


 


carvediloL [Coreg] 12.5 mg PO BID 08/08/22 08/08/22 Unknown History


 


sevelamer HCL [Sevelamer HCl] 800 mg PO TID 08/08/22 08/08/22 Unknown History


 


Benzonatate [Tessalon Perles] 100 mg PO Q8HR PRN #30 cap 08/15/22  Unknown Rx


 


NIFEdipine XL [Procardia Xl] 60 mg PO Q12HR #60 tablet 08/15/22  Unknown Rx











Active Meds: 


Active Medications





Acetaminophen (Acetaminophen 325 Mg Tab)  650 mg PO Q4H PRN


   PRN Reason: Pain MILD(1-3)/Fever >100.5/HA


Ondansetron HCl (Ondansetron 4 Mg/2 Ml Inj)  4 mg IV Q8H PRN


   PRN Reason: Nausea And Vomiting


Sodium Chloride (Sodium Chloride 0.9% 10 Ml Flush Syringe)  10 ml IV BID BERHANE


Sodium Chloride (Sodium Chloride 0.9% 10 Ml Flush Syringe)  10 ml IV PRN PRN


   PRN Reason: LINE FLUSH











Review of Systems


All systems: negative


Constitutional: weakness





Exam





- Constitutional


Vitals: 


                                        











Temp Pulse Resp BP Pulse Ox


 


 98.4 F   78   20   100/44   94 


 


 08/22/22 20:44  08/22/22 20:45  08/22/22 20:45  08/22/22 20:45  08/22/22 20:45











General appearance: Present: no acute distress, well-nourished





- EENT


Eyes: Present: PERRL


ENT: hearing intact, clear oral mucosa





- Neck


Neck: Present: supple, normal ROM





- Respiratory


Respiratory effort: normal


Respiratory: bilateral: CTA





- Cardiovascular


Heart Sounds: Present: S1 & S2.  Absent: rub, click





- Extremities


Extremities: pulses symmetrical, No edema


Peripheral Pulses: within normal limits





- Abdominal


General gastrointestinal: Present: soft, non-tender, non-distended, normal bowel

sounds


Female genitourinary: Present: normal





- Integumentary


Integumentary: Present: clear, warm, dry





- Musculoskeletal


Musculoskeletal: gait normal, strength equal bilaterally





- Psychiatric


Psychiatric: appropriate mood/affect, intact judgment & insight





- Neurologic


Neurologic: CNII-XII intact, moves all extremities





Results





- Labs


CBC & Chem 7: 


                                 08/22/22 21:47





                                 08/22/22 21:47


Labs: 


                             Laboratory Last Values











WBC  9.4 K/mm3 (4.5-11.0)   08/22/22  21:47    


 


RBC  2.61 M/mm3 (3.65-5.03)  L  08/22/22  21:47    


 


Hgb  8.2 gm/dl (10.1-14.3)  L  08/22/22  21:47    


 


Hct  25.3 % (30.3-42.9)  L  08/22/22  21:47    


 


MCV  97 fl (79-97)   08/22/22  21:47    


 


MCH  31 pg (28-32)   08/22/22  21:47    


 


MCHC  32 % (30-34)   08/22/22  21:47    


 


RDW  16.7 % (13.2-15.2)  H  08/22/22  21:47    


 


Plt Count  235 K/mm3 (140-440)   08/22/22  21:47    


 


Lymph % (Auto)  10.3 % (13.4-35.0)  L  08/22/22  21:47    


 


Mono % (Auto)  8.7 % (0.0-7.3)  H  08/22/22  21:47    


 


Eos % (Auto)  0.7 % (0.0-4.3)   08/22/22  21:47    


 


Baso % (Auto)  1.4 % (0.0-1.8)   08/22/22  21:47    


 


Lymph # (Auto)  1.0 K/mm3 (1.2-5.4)  L  08/22/22  21:47    


 


Mono # (Auto)  0.8 K/mm3 (0.0-0.8)   08/22/22  21:47    


 


Eos # (Auto)  0.1 K/mm3 (0.0-0.4)   08/22/22  21:47    


 


Baso # (Auto)  0.1 K/mm3 (0.0-0.1)   08/22/22  21:47    


 


Seg Neutrophils %  78.9 % (40.0-70.0)  H  08/22/22  21:47    


 


Seg Neutrophils #  7.4 K/mm3 (1.8-7.7)   08/22/22  21:47    


 


Sodium  136 mmol/L (137-145)  L  08/22/22  21:47    


 


Potassium  4.6 mmol/L (3.6-5.0)   08/22/22  21:47    


 


Chloride  101.9 mmol/L ()   08/22/22  21:47    


 


Carbon Dioxide  21 mmol/L (22-30)  L  08/22/22  21:47    


 


Anion Gap  18 mmol/L  08/22/22  21:47    


 


BUN  93 mg/dL (7-17)  H  08/22/22  21:47    


 


Creatinine  5.4 mg/dL (0.6-1.2)  H  08/22/22  21:47    


 


Estimated GFR  8 ml/min  08/22/22  21:47    


 


BUN/Creatinine Ratio  17 %  08/22/22  21:47    


 


Glucose  107 mg/dL ()  H  08/22/22  21:47    


 


Calcium  8.4 mg/dL (8.4-10.2)   08/22/22  21:47    














Assessment and Plan


VTE prophylaxis?: Mechanical


Plan of care discussed with patient/family: Yes





- Patient Problems


(1) End-stage renal disease on hemodialysis


Current Visit: Yes   Status: Acute   


Plan to address problem: 


Admit the patient to the medical floor.  Renal diet.  Will consult nephrology 

for hemodialysis in the morning.  Recheck BMP in the morning.  Continue home 

medication








(2) CHF (congestive heart failure)


Current Visit: No   Status: Acute   


Plan to address problem: 


Stable.  Avoid fluid overload.  Maintain input output.  Daily weight.  

Hemodialysis in the morning








(3) COVID-19


Current Visit: No   Status: Acute   


Plan to address problem: 


Patient completed treatment for COVID-19.  Oxygen by nasal cannula 3 L/min.  

DuoNeb via nebulizer every 4 hours.  Albuterol via nebulizer every 4 hours as 

needed.  COVID precaution.  Consult infectious disease if needed








(4) Hypothyroidism


Current Visit: No   Status: Acute   


Plan to address problem: 


Stable.  We continue the home medication.








(5) DVT prophylaxis


Current Visit: No   Status: Acute   


Plan to address problem: 


SCD for DVT prophylaxis.  Pepcid 20 mg p.o. twice daily for GI prophylaxis.  

Patient is a full code

## 2022-08-23 LAB
BASOPHILS # (AUTO): 0.1 K/MM3 (ref 0–0.1)
BASOPHILS NFR BLD AUTO: 1.2 % (ref 0–1.8)
BUN SERPL-MCNC: 95 MG/DL (ref 7–17)
BUN/CREAT SERPL: 16 %
CALCIUM SERPL-MCNC: 8.8 MG/DL (ref 8.4–10.2)
EOSINOPHIL # BLD AUTO: 0.1 K/MM3 (ref 0–0.4)
EOSINOPHIL NFR BLD AUTO: 1 % (ref 0–4.3)
HCT VFR BLD CALC: 27.1 % (ref 30.3–42.9)
HEMOLYSIS INDEX: 3
HGB BLD-MCNC: 8.6 GM/DL (ref 10.1–14.3)
LYMPHOCYTES # BLD AUTO: 0.7 K/MM3 (ref 1.2–5.4)
LYMPHOCYTES NFR BLD AUTO: 11.6 % (ref 13.4–35)
MCHC RBC AUTO-ENTMCNC: 32 % (ref 30–34)
MCV RBC AUTO: 99 FL (ref 79–97)
MONOCYTES # (AUTO): 0.4 K/MM3 (ref 0–0.8)
MONOCYTES % (AUTO): 6.6 % (ref 0–7.3)
PLATELET # BLD: 237 K/MM3 (ref 140–440)
RBC # BLD AUTO: 2.74 M/MM3 (ref 3.65–5.03)

## 2022-08-23 PROCEDURE — 5A1D70Z PERFORMANCE OF URINARY FILTRATION, INTERMITTENT, LESS THAN 6 HOURS PER DAY: ICD-10-PCS | Performed by: INTERNAL MEDICINE

## 2022-08-23 RX ADMIN — ACETAMINOPHEN PRN MG: 325 TABLET ORAL at 21:15

## 2022-08-23 RX ADMIN — IPRATROPIUM BROMIDE AND ALBUTEROL SULFATE SCH: .5; 3 SOLUTION RESPIRATORY (INHALATION) at 10:46

## 2022-08-23 RX ADMIN — FAMOTIDINE SCH: 20 TABLET ORAL at 10:29

## 2022-08-23 RX ADMIN — IPRATROPIUM BROMIDE AND ALBUTEROL SULFATE SCH: .5; 3 SOLUTION RESPIRATORY (INHALATION) at 02:57

## 2022-08-23 RX ADMIN — Medication SCH ML: at 21:15

## 2022-08-23 RX ADMIN — Medication SCH: at 10:29

## 2022-08-23 NOTE — EVENT NOTE
Date: 08/23/22





Patient seen by Dr. Prince last admission.  [facility]  I reached out and confirmed she is 

their patient.  Transfer renal care to [facility]

## 2022-08-23 NOTE — CONSULTATION
History of Present Illness





- Reason for Consult


Consult date: 08/23/22


end stage renal disease





- History of Present Illness





Mrs. Vasques is a 60yo with ESRD on HD and COVID 19 infection who presented for 

outpatient dialysis at [facility] yesterday for outpatient hemodialysis.  

However, due to inadequate staffing at ,  patient was unable to receive 

treatment and was transported back to the .   Patient's sister notified 

[facility] of patient's inability to receive treatment.  As 

patient last dialyzed on Thursday and next dialysis treatment would be on 

Wednesday at [facility], it was advised that patient return to the 

hospital. 





Past History


Past Medical History: ESRD, hypertension, hypothyroidism, renal failure


Past Surgical History: No surgical history


Social history: no significant social history


Family history: hypertension





Medications and Allergies


                                    Allergies











Allergy/AdvReac Type Severity Reaction Status Date / Time


 


NSAIDS (Non-Steroidal Allergy  Rash Verified 08/08/22 13:30





Anti-Inflamma     


 


Sulfa (Sulfonamide Allergy  Rash Verified 08/08/22 13:30





Antibiotics)     











                                Home Medications











 Medication  Instructions  Recorded  Confirmed  Last Taken  Type


 


Ascorbic Acid [Vitamin C] 500 mg PO DAILY 08/08/22 08/08/22 Unknown History


 


Ferrous Sulfate [Ferrous Sulfate 324 mg PO DAILY 08/08/22 08/08/22 Unknown 

History





324 MG]     


 


Levothyroxine Sodium 200 mcg PO DAILY 08/08/22 08/08/22 Unknown History





[Levothyroxine]     


 


Venlafaxine [Effexor 37.5mg tab] 37.5 mg PO QDAY 08/08/22 08/08/22 Unknown 

History


 


carvediloL [Coreg] 12.5 mg PO BID 08/08/22 08/08/22 Unknown History


 


sevelamer HCL [Sevelamer HCl] 800 mg PO TID 08/08/22 08/08/22 Unknown History


 


Benzonatate [Tessalon Perles] 100 mg PO Q8HR PRN #30 cap 08/15/22  Unknown Rx


 


NIFEdipine XL [Procardia Xl] 60 mg PO Q12HR #60 tablet 08/15/22  Unknown Rx











Active Meds: 


Active Medications





Acetaminophen (Acetaminophen 325 Mg Tab)  650 mg PO Q4H PRN


   PRN Reason: Pain MILD(1-3)/Fever >100.5/HA


Albuterol (Albuterol 2.5 Mg/3 Ml Nebu)  2.5 mg IH Q3HRT PRN


   PRN Reason: Shortness Of Breath


Albuterol/Ipratropium (Ipratropium/Albuterol Sulfate 3 Ml Ampul.Neb)  1 ampul IH

Q6HRT BERHANE


Famotidine (Famotidine 20 Mg Tab)  20 mg PO QAM BERHANE


Morphine Sulfate (Morphine 2 Mg/1 Ml Inj)  2 mg IV Q4H PRN


   PRN Reason: Pain, Moderate (4-6)


Morphine Sulfate (Morphine 4 Mg/1 Ml Inj)  4 mg IV Q4H PRN


   PRN Reason: Pain , Severe (7-10)


Ondansetron HCl (Ondansetron 4 Mg/2 Ml Inj)  4 mg IV Q8H PRN


   PRN Reason: Nausea And Vomiting


Sodium Chloride (Sodium Chloride 0.9% 10 Ml Flush Syringe)  10 ml IV BID BERHANE


Sodium Chloride (Sodium Chloride 0.9% 10 Ml Flush Syringe)  10 ml IV PRN PRN


   PRN Reason: LINE FLUSH











Review of Systems


All systems: negative





Exam





- Vital Signs


Vital signs: 


                                   Vital Signs











Temp Pulse Resp BP


 


 98.8 F   88   18   96/62 


 


 08/22/22 19:57  08/22/22 19:57  08/22/22 19:57  08/22/22 19:57














- General Appearance


General appearance: well-developed, well-nourished


EENT: ATNC


Respiratory: Other (isolation stethoscope not available in patient's room)


Heart: other (isolation stethoscope not available in patient's room)


Gastrointestinal: Absent: tenderness, distended


Integumentary: no rash


Neurologic: alert and oriented x3


Psychiatric: cooperative





Results





- Lab Results





                                 08/23/22 11:41





                                 08/23/22 11:41


                             Most recent lab results











Calcium  8.4 mg/dL (8.4-10.2)   08/22/22  21:47    














Assessment and Plan





Impression:


* End stage renal disease


* Azotemia


* Metabolic acidosis


* COVID 19 infection


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


* Secondary hyperparathyroidism





Plan:


* Hemodialysis today


* UF as tolerated


* Epogen TIW prn


* Renal/HD diet


* Management of COVID 19 per primary team


* Consult case management to confirm chair date/time

## 2022-08-23 NOTE — PROGRESS NOTE
Assessment and Plan


Assessment and plan: 





Assessment and Plan


VTE prophylaxis?: Mechanical


Plan of care discussed with patient/family: Yes





- Patient Problems


(1) End-stage renal disease on hemodialysis


Current Visit: Yes   Status: Acute   


Plan to address problem: 


Admit the patient to the medical floor.  Renal diet.  Will consult nephrology 

for hemodialysis in the morning.  Recheck BMP in the morning.  Continue home 

medication








(2) CHF (congestive heart failure)


Current Visit: No   Status: Acute   


Plan to address problem: 


Stable.  Avoid fluid overload.  Maintain input output.  Daily weight.  

Hemodialysis in the morning








(3) Volume overload


Patient needs emergent hemodialysis





(4) Hypothyroidism


Current Visit: No   Status: Acute   


Plan to address problem: 


Stable.  We continue the home medication.








(5) DVT prophylaxis


Current Visit: No   Status: Acute   


Plan to address problem: 


SCD for DVT prophylaxis.  Pepcid 20 mg p.o. twice daily for GI prophylaxis.  Anibal varela is a full code








Disposition Plan: Discharge tomorrow after hemodialysis which was done around 6 

PM today


Total Time Spent with Patient (Minutes): 35 minutes





History


Interval history: 





61-year-old morbidly obese female with multiple medical comorbidities including 

end-stage renal disease on hemodialysis, Tuesday Thursday Saturday, presents 

stating she wants dialysis.  Patient states she is COVID-positive.  She tested 

+9 days ago and had a repeat test per her verbal report, today, and still tested

positive.  She denies any symptoms at this time.  She states that her rehab 

facility arranged for her to go to dialysis at a new facility that manages 

patients who are COVID-positive and need dialysis.  She states "however they 

said they could not dialyze me because they did not have the right paperwork."  

She denies any muscle aches chest pain shortness of breath difficulty breathing 

palpitations weakness lightheadedness dizziness or any other symptoms.  Pain 

currently 0 out of 10.  Patient states she does not know the last time she 

underwent hemodialysis.








In the emergency room patient BUN is 93 and creatinine 5.4.  We are going to 

admit the patient reconsult nephrology for hemodialysis in the morning











Hospitalist Physical





- Constitutional


Vitals: 


                                        











Temp Pulse Resp BP Pulse Ox


 


 98.4 F   91 H  16   129/75   89 


 


 08/22/22 20:44  08/23/22 12:50  08/23/22 12:50  08/23/22 12:50  08/23/22 12:50











General appearance: Present: no acute distress, well-nourished





- Cardiovascular


Heart rate: 78


Rhythm: regular





- Extremities


Extremities: no ischemia, pulses intact





- Allied Health


Allied health notes reviewed: nursing, case management





Results





- Labs


CBC & Chem 7: 


                                 08/23/22 11:41





                                 08/23/22 11:41


Labs: 


                             Laboratory Last Values











WBC  6.1 K/mm3 (4.5-11.0)   08/23/22  11:41    


 


RBC  2.74 M/mm3 (3.65-5.03)  L  08/23/22  11:41    


 


Hgb  8.6 gm/dl (10.1-14.3)  L  08/23/22  11:41    


 


Hct  27.1 % (30.3-42.9)  L  08/23/22  11:41    


 


MCV  99 fl (79-97)  H  08/23/22  11:41    


 


MCH  32 pg (28-32)   08/23/22  11:41    


 


MCHC  32 % (30-34)   08/23/22  11:41    


 


RDW  16.8 % (13.2-15.2)  H  08/23/22  11:41    


 


Plt Count  237 K/mm3 (140-440)   08/23/22  11:41    


 


Lymph % (Auto)  11.6 % (13.4-35.0)  L  08/23/22  11:41    


 


Mono % (Auto)  6.6 % (0.0-7.3)   08/23/22  11:41    


 


Eos % (Auto)  1.0 % (0.0-4.3)   08/23/22  11:41    


 


Baso % (Auto)  1.2 % (0.0-1.8)   08/23/22  11:41    


 


Lymph # (Auto)  0.7 K/mm3 (1.2-5.4)  L  08/23/22  11:41    


 


Mono # (Auto)  0.4 K/mm3 (0.0-0.8)   08/23/22  11:41    


 


Eos # (Auto)  0.1 K/mm3 (0.0-0.4)   08/23/22  11:41    


 


Baso # (Auto)  0.1 K/mm3 (0.0-0.1)   08/23/22  11:41    


 


Seg Neutrophils %  79.6 % (40.0-70.0)  H  08/23/22  11:41    


 


Seg Neutrophils #  4.9 K/mm3 (1.8-7.7)   08/23/22  11:41    


 


Sodium  141 mmol/L (137-145)   08/23/22  11:41    


 


Potassium  5.3 mmol/L (3.6-5.0)  H  08/23/22  11:41    


 


Chloride  106.1 mmol/L ()   08/23/22  11:41    


 


Carbon Dioxide  22 mmol/L (22-30)   08/23/22  11:41    


 


Anion Gap  18 mmol/L  08/23/22  11:41    


 


BUN  95 mg/dL (7-17)  H  08/23/22  11:41    


 


Creatinine  5.8 mg/dL (0.6-1.2)  H  08/23/22  11:41    


 


Estimated GFR  7 ml/min  08/23/22  11:41    


 


BUN/Creatinine Ratio  16 %  08/23/22  11:41    


 


Glucose  82 mg/dL ()   08/23/22  11:41    


 


Calcium  8.8 mg/dL (8.4-10.2)   08/23/22  11:41    














Active Medications





- Current Medications


Current Medications: 














Generic Name Dose Route Start Last Admin





  Trade Name Freq  PRN Reason Stop Dose Admin


 


Acetaminophen  650 mg  08/22/22 23:17 





  Acetaminophen 325 Mg Tab  PO  





  Q4H PRN  





  Pain MILD(1-3)/Fever >100.5/HA  


 


Albuterol  2 puff  08/23/22 10:46 





  Albuterol 8.5 Gm Mdi Inhalation  IH  





  Q4HRT PRN  





  Shortness Of Breath  


 


Famotidine  20 mg  08/23/22 10:00 





  Famotidine 20 Mg Tab  PO  





  QAM BERHANE  


 


Sodium Chloride  100 mls @ 999 mls/hr  08/23/22 09:38 





  Nacl 0.9%  IV  





  NASH PRN  





  Hypotension  


 


Morphine Sulfate  2 mg  08/22/22 23:17 





  Morphine 2 Mg/1 Ml Inj  IV  





  Q4H PRN  





  Pain, Moderate (4-6)  


 


Morphine Sulfate  4 mg  08/22/22 23:17 





  Morphine 4 Mg/1 Ml Inj  IV  





  Q4H PRN  





  Pain , Severe (7-10)  


 


Ondansetron HCl  4 mg  08/22/22 23:17 





  Ondansetron 4 Mg/2 Ml Inj  IV  





  Q8H PRN  





  Nausea And Vomiting  


 


Sodium Chloride  10 ml  08/23/22 10:00 





  Sodium Chloride 0.9% 10 Ml Flush Syringe  IV  





  BID BERHANE  


 


Sodium Chloride  10 ml  08/22/22 23:17 





  Sodium Chloride 0.9% 10 Ml Flush Syringe  IV  





  PRN PRN  





  LINE FLUSH

## 2022-08-24 PROCEDURE — 5A1D70Z PERFORMANCE OF URINARY FILTRATION, INTERMITTENT, LESS THAN 6 HOURS PER DAY: ICD-10-PCS | Performed by: INTERNAL MEDICINE

## 2022-08-24 RX ADMIN — Medication SCH ML: at 11:06

## 2022-08-24 RX ADMIN — Medication SCH ML: at 21:21

## 2022-08-24 RX ADMIN — FAMOTIDINE SCH MG: 20 TABLET ORAL at 12:05

## 2022-08-24 RX ADMIN — ACETAMINOPHEN PRN MG: 325 TABLET ORAL at 21:28

## 2022-08-24 NOTE — PROGRESS NOTE
Assessment and Plan





Impression:


* End stage renal disease


* Azotemia


* Metabolic acidosis


* COVID 19 infection


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


* Secondary hyperparathyroidism





Plan:


* Patient is s/p HD yesterday


* Continue HD MWF 


* UF as tolerated


* Epogen TIW prn


* Renal/HD diet


* Management of COVID 19 per primary team





Subjective


Date of service: 08/24/22





Objective





- Vital Signs


Vital signs: 


                               Vital Signs - 12hr











  08/23/22 08/23/22 08/24/22





  22:00 22:50 01:00


 


Temperature  99 F 


 


Pulse Rate  78 


 


Respiratory  18 





Rate   


 


Blood Pressure  106/43 





[Right]   


 


O2 Sat by Pulse 98 94 94





Oximetry   














  08/24/22





  03:08


 


Temperature 97.4 F L


 


Pulse Rate 75


 


Respiratory 16





Rate 


 


Blood Pressure 128/62





[Right] 


 


O2 Sat by Pulse 97





Oximetry 














- Lab





                                 08/23/22 11:41





                                 08/23/22 11:41


                             Most recent lab results











Calcium  8.8 mg/dL (8.4-10.2)   08/23/22  11:41    














Medications & Allergies





- Medications


Allergies/Adverse Reactions: 


                                    Allergies





NSAIDS (Non-Steroidal Anti-Inflamma Allergy (Verified 08/08/22 13:30)


   Rash


Sulfa (Sulfonamide Antibiotics) Allergy (Verified 08/08/22 13:30)


   Rash








Home Medications: 


                                Home Medications











 Medication  Instructions  Recorded  Confirmed  Last Taken  Type


 


Ascorbic Acid [Vitamin C] 500 mg PO DAILY 08/08/22 08/08/22 Unknown History


 


Ferrous Sulfate [Ferrous Sulfate 324 mg PO DAILY 08/08/22 08/08/22 Unknown 

History





324 MG]     


 


Levothyroxine Sodium 200 mcg PO DAILY 08/08/22 08/08/22 Unknown History





[Levothyroxine]     


 


Venlafaxine [Effexor 37.5mg tab] 37.5 mg PO QDAY 08/08/22 08/08/22 Unknown 

History


 


carvediloL [Coreg] 12.5 mg PO BID 08/08/22 08/08/22 Unknown History


 


sevelamer HCL [Sevelamer HCl] 800 mg PO TID 08/08/22 08/08/22 Unknown History


 


Benzonatate [Tessalon Perles] 100 mg PO Q8HR PRN #30 cap 08/15/22  Unknown Rx


 


NIFEdipine XL [Procardia Xl] 60 mg PO Q12HR #60 tablet 08/15/22  Unknown Rx











Active Medications: 














Generic Name Dose Route Start Last Admin





  Trade Name Freq  PRN Reason Stop Dose Admin


 


Acetaminophen  650 mg  08/22/22 23:17  08/23/22 21:15





  Acetaminophen 325 Mg Tab  PO   650 mg





  Q4H PRN   Administration





  Pain MILD(1-3)/Fever >100.5/HA  


 


Albuterol  2 puff  08/23/22 10:46 





  Albuterol 8.5 Gm Mdi Inhalation  IH  





  Q4HRT PRN  





  Shortness Of Breath  


 


Famotidine  20 mg  08/23/22 10:00  08/23/22 10:29





  Famotidine 20 Mg Tab  PO   Not Given





  QAM [facility]  


 


Sodium Chloride  100 mls @ 999 mls/hr  08/23/22 09:38 





  Nacl 0.9%  IV  





  NASH PRN  





  Hypotension  


 


Morphine Sulfate  2 mg  08/22/22 23:17 





  Morphine 2 Mg/1 Ml Inj  IV  





  Q4H PRN  





  Pain, Moderate (4-6)  


 


Morphine Sulfate  4 mg  08/22/22 23:17 





  Morphine 4 Mg/1 Ml Inj  IV  





  Q4H PRN  





  Pain , Severe (7-10)  


 


Ondansetron HCl  4 mg  08/22/22 23:17 





  Ondansetron 4 Mg/2 Ml Inj  IV  





  Q8H PRN  





  Nausea And Vomiting  


 


Sodium Chloride  10 ml  08/23/22 10:00  08/23/22 21:15





  Sodium Chloride 0.9% 10 Ml Flush Syringe  IV   10 ml





  BID BERHANE   Administration


 


Sodium Chloride  10 ml  08/22/22 23:17 





  Sodium Chloride 0.9% 10 Ml Flush Syringe  IV  





  PRN PRN  





  LINE FLUSH

## 2022-08-24 NOTE — PROGRESS NOTE
Assessment and Plan





Assessment and Plan


VTE prophylaxis?: Mechanical


Plan of care discussed with patient/family: Yes





- Patient Problems


(1) End-stage renal disease on hemodialysis


Current Visit: Yes   Status: Acute   


Plan to address problem: 


Admit the patient to the medical floor.  Renal diet.  Will consult nephrology 

for hemodialysis in the morning.  Recheck BMP in the morning.  Continue home 

medication








(2) CHF (congestive heart failure)


Current Visit: No   Status: Acute   


Plan to address problem: 


Stable.  Avoid fluid overload.  Maintain input output.  Daily weight.  

Hemodialysis in the morning








(3) Volume overload


Patient needs emergent hemodialysis





(4) Hypothyroidism


Current Visit: No   Status: Acute   


Plan to address problem: 


Stable.  We continue the home medication.








(5) COVID positive


Patient is on room air


Oxygen saturations 96% on room air








6) DVT prophylaxis


 on heparin and GI prophylaxis











Subjective


Date of service: 08/24/22


Principal diagnosis: ESRD, COVID positive


Interval history: 





61-year-old morbidly obese female with multiple medical comorbidities including 

end-stage renal disease on hemodialysis, Tuesday Thursday Saturday, presents 

stating she wants dialysis.  Patient states she is COVID-positive.  She tested 

+9 days ago and had a repeat test per her verbal report, today, and still tested

positive.  She denies any symptoms at this time.  She states that her rehab 

facility arranged for her to go to dialysis at a new facility that manages 

patients who are COVID-positive and need dialysis.  She states "however they 

said they could not dialyze me because they did not have the right paperwork."  

She denies any muscle aches chest pain shortness of breath difficulty breathing 

palpitations weakness lightheadedness dizziness or any other symptoms.  Pain 

currently 0 out of 10.  Patient states she does not know the last time she 

underwent hemodialysis.








In the emergency room patient BUN is 93 and creatinine 5.4.  We are going to 

admit the patient reconsult nephrology for hemodialysis in the morning





8/24/2022


Patient tested positive for COVID


Doing well on room air











Objective





- Constitutional


Vitals: 


                               Vital Signs - 12hr











  08/24/22 08/24/22 08/24/22





  10:00 11:33 13:00


 


Temperature  98.4 F 


 


Pulse Rate  80 


 


Respiratory  18 





Rate   


 


Blood Pressure  123/59 


 


O2 Sat by Pulse 96 96 95





Oximetry   











General appearance: Present: no acute distress, well-nourished





- EENT


Eyes: PERRL, EOM intact


ENT: hearing intact, clear oral mucosa


Ears: bilateral: normal





- Neck


Neck: supple, normal ROM





- Respiratory


Respiratory effort: normal


Respiratory: bilateral: CTA





- Breasts


Breasts: normal





- Cardiovascular


Heart rate: 78


Rhythm: regular


Heart Sounds: Present: S1 & S2.  Absent: gallop, rub


Extremities: pulses intact, No edema, normal color, Full ROM





- Gastrointestinal


General gastrointestinal: Present: soft, non-tender, non-distended, normal bowel

sounds





- Genitourinary


Female genitourinary: normal





- Integumentary


Integumentary: clear, warm, dry





- Musculoskeletal


Musculoskeletal: 1, strength equal bilaterally





- Neurologic


Neurologic: moves all extremities





- Psychiatric


Psychiatric: memory intact, appropriate mood/affect, intact judgment & insight





- Labs


CBC & Chem 7: 


                                 08/23/22 11:41





                                 08/23/22 11:41


Labs: 


                              Abnormal lab results











  08/24/22 Range/Units





  11:50 


 


SARS-CoV-2 (PCR)  Positive A  (Negative)

## 2022-08-25 VITALS — SYSTOLIC BLOOD PRESSURE: 115 MMHG | DIASTOLIC BLOOD PRESSURE: 59 MMHG

## 2022-08-25 RX ADMIN — FAMOTIDINE SCH MG: 20 TABLET ORAL at 09:44

## 2022-08-25 RX ADMIN — Medication SCH ML: at 09:45
